# Patient Record
Sex: FEMALE | Race: WHITE | Employment: OTHER | URBAN - METROPOLITAN AREA
[De-identification: names, ages, dates, MRNs, and addresses within clinical notes are randomized per-mention and may not be internally consistent; named-entity substitution may affect disease eponyms.]

---

## 2017-06-14 ENCOUNTER — OFFICE VISIT (OUTPATIENT)
Dept: ORTHOPEDICS | Facility: CLINIC | Age: 52
End: 2017-06-14

## 2017-06-14 VITALS — WEIGHT: 202 LBS | BODY MASS INDEX: 31.71 KG/M2 | HEIGHT: 67 IN

## 2017-06-14 DIAGNOSIS — G89.29 CHRONIC MIDLINE LOW BACK PAIN WITH RIGHT-SIDED SCIATICA: Primary | ICD-10-CM

## 2017-06-14 DIAGNOSIS — M54.41 CHRONIC MIDLINE LOW BACK PAIN WITH RIGHT-SIDED SCIATICA: Primary | ICD-10-CM

## 2017-06-14 DIAGNOSIS — E11.9 DIABETES MELLITUS, TYPE 2 (H): ICD-10-CM

## 2017-06-14 ASSESSMENT — ENCOUNTER SYMPTOMS
NECK PAIN: 1
POLYDIPSIA: 1
SEIZURES: 0
NECK MASS: 0
HOARSE VOICE: 0
SMELL DISTURBANCE: 0
MYALGIAS: 1
WEIGHT LOSS: 0
NUMBNESS: 1
WEAKNESS: 1
SLEEP DISTURBANCES DUE TO BREATHING: 0
HALLUCINATIONS: 0
INCREASED ENERGY: 1
SINUS PAIN: 0
EYE IRRITATION: 0
PARALYSIS: 0
DEPRESSION: 1
POLYPHAGIA: 0
STIFFNESS: 1
ORTHOPNEA: 0
POOR WOUND HEALING: 0
INSOMNIA: 1
EXERCISE INTOLERANCE: 0
SPEECH CHANGE: 0
LOSS OF CONSCIOUSNESS: 0
NERVOUS/ANXIOUS: 1
JOINT SWELLING: 0
DECREASED CONCENTRATION: 1
NAIL CHANGES: 0
MUSCLE WEAKNESS: 1
CHILLS: 0
DISTURBANCES IN COORDINATION: 0
EYE REDNESS: 0
ARTHRALGIAS: 1
CLAUDICATION: 0
WEIGHT GAIN: 0
TROUBLE SWALLOWING: 0
MUSCLE CRAMPS: 1
LEG SWELLING: 0
SYNCOPE: 0
DECREASED APPETITE: 0
TASTE DISTURBANCE: 0
HEADACHES: 1
PANIC: 0
EYE WATERING: 0
SORE THROAT: 0
FEVER: 0
LEG PAIN: 1
HYPOTENSION: 0
ALTERED TEMPERATURE REGULATION: 0
SINUS CONGESTION: 0
TREMORS: 0
PALPITATIONS: 0
TACHYCARDIA: 0
SKIN CHANGES: 0
EYE PAIN: 0
BACK PAIN: 1
HYPERTENSION: 1
MEMORY LOSS: 1
TINGLING: 1
FATIGUE: 1
NIGHT SWEATS: 1
LIGHT-HEADEDNESS: 0
DOUBLE VISION: 0
DIZZINESS: 1

## 2017-06-14 NOTE — NURSING NOTE
"Reason For Visit:   Chief Complaint   Patient presents with     RECHECK     low back and davin leg pain, right worse than left       Primary MD: Dr. Mary Ellen Salgado  Ref. MD: Dr. Browning    Occupation [employer].  Currently working? Yes.  Work status?  Full time.  Date of injury: over the years  Type of injury: several injuries over the years.  Date of surgery: 2011  Type of surgery: low back surgery with Dr. Santos.  Smoker: No    Ht 1.71 m (5' 7.32\")  Wt 91.6 kg (202 lb)  BMI 31.33 kg/m2    Pain Assessment  Patient Currently in Pain: Yes  0-10 Pain Scale: 6  Primary Pain Location: Back  Pain Orientation: Lower  Other Pain Locations: davin leg pain  Pain Descriptors: Sharp, Dull, Aching, Burning, Throbbing  Alleviating Factors: Pain medication  Aggravating Factors: Other (comment) (any prolonged position or activity)    Oswestry (GABRIEL) Questionnaire    OSWESTRY DISABILITY INDEX 6/14/2017   Section 1 - Pain intensity -   Section 2 - Personal care (washing, dressing, etc.)  -   Section 3 - Lifting -   Section 4 - Walking -   Section 5 - Sitting -   Section 6 - Standing -   Section 7 - Sleeping -   Section 8 - Sex life (if applicable) -   Section 9 - Social life -   Section 10 - Traveling -   Count -   Sum -   Oswestry Score (%) -   SECTION 1-PAIN INTENSITY 3  The pain is fairly severe at the moment.   SECTION 2-PERSONAL CARE (WASHING,DRESSING,ETC.) 2  It is painful to look after myself and I am slow and careful.   SECTION 3-LIFTING 4  I can only lift very light weights.   SECTION 4-WALKING 3  Pain prevents me from walking more than 100 yards.   SECTION 5-SITTING 3  Pain prevents me from sitting for more than half an hour   SECTION 6-STANDING 3  Pain prevents me from standing for more than half an hour.   SECTION 7-SLEEPING 2  Because of pain I have less than 6 hours sleep.   SECTION 8-SEX LIFE (IF APPLICABLE) 4  My sex life is nearly non existent because of pain.   SECTION 9-SOCIAL LIFE 3  Pain has restricted my " social life and I do not go out as often.   SECTION 10-TRAVELING 2  Pain is bad but I am able to manage trips over two hours.   Oswestry Disability Index: Count 10   GABRIEL: Total Score = SUM (total for answered questions) 29   Computed Oswestry Score 58 (%)                      Numeric Rating Scale:  VAS Scores     VAS Survey 6/14/2017   What is your level of back pain during the last week: 6.5   What is your level of RIGHT leg pain during the last week: 7.0   What is your level of LEFT leg pain during the last week: 3.5   What is your level of neck pain during the last week: 5.5   What is your level of RIGHT arm pain during the last week: 0.5   What is your level of LEFT arm pain during the last week: 1.0                Promis 10 Assessment    PROMIS 10 6/14/2017   In general, would you say your health is: Good = 3   In general, would you say your quality of life is: Poor = 1   In general, how would you rate your physical health? Good = 3   In general, how would you rate your mental health, including your mood and your ability to think? Good = 3   In general, how would you rate your satisfaction with your social activities and relationships? Fair = 2   In general, please rate how well you carry out your usual social activities and roles Fair = 2   To what extent are you able to carry out your everyday physical activities such as walking, climbing stairs, carrying groceries, or moving a chair? A Little = 2   How often have you been bothered by emotional problems such as feeling anxious, depressed or irritable? Sometimes = 3   How would you rate your fatigue on average? Moderate = 3   How would you rate your pain on average?   0 = No Pain  to  10 = Worst Imaginable Pain 7   Global Physical Health Score : Raw Score 10 (SUM : G03 - G06 - G07 - G08)   Global Mentall Health Score : Raw Score 9 (SUM : G02 - G04 - G05 - G10)   Total (Physical + Mental Health Score) 19

## 2017-06-14 NOTE — MR AVS SNAPSHOT
After Visit Summary   6/14/2017    Yun Sagastume    MRN: [id number]           Patient Information     Date Of Birth          1965        Visit Information        Provider Department      6/14/2017 8:45 AM Chris Tim MD [facility]        Today's Diagnoses     Chronic midline low back pain with right-sided sciatica    -  1    Diabetes mellitus, type 2 (H)           Follow-ups after your visit        Additional Services     NEUROLOGY ADULT REFERRAL       Your provider has referred you to: Neurology DR.Leland West at [facility]. Clinic of Neurology # [phone] fax# [phone]      Reason for Referral: Consult,  New MRI &  EMG to be done before appt. At [facility] & sent to     Please be aware that coverage of these services is subject to the terms and limitations of your health insurance plan.  Call member services at your health plan with any benefit or coverage questions.      Please bring the following with you to your appointment:    (1) Any X-Rays, CTs or MRIs which have been performed.  Contact the facility where they were done to arrange for  prior to your scheduled appointment.    (2) List of current medications  (3) This referral request   (4) Any documents/labs given to you for this referral                  Your next 10 appointments already scheduled     Jul 03, 2017  3:00 PM CDT   (Arrive by 2:45 PM)   EMG with Raúl Yin MD   [facility] ([facility])    [address] [place] [address] [phone]           Do not use lotions or creams on the area to be tested. If you are on blood thinners (Warfarin, Coumadin, Lovenox, etc), please contact your primary care physician to check if it is safe to stop them 3 days prior to testing. If you have anxiety, please check with your referring physician to obtain anti-anxiety medication for the procedure.            Aug 03, 2017 11:45 AM CDT  "  (Arrive by 11:15 AM)   RETURN SPINE with Chris Tim MD   [facility] ([facility])    [address]  [address] [facility] [address] [phone]              Who to contact     Please call your clinic at [phone] to:    Ask questions about your health    Make or cancel appointments    Discuss your medicines    Learn about your test results    Speak to your doctor   If you have compliments or concerns about an experience at your clinic, or if you wish to file a complaint, please contact [facility] Patient Relations at [phone] or email us at [email or web address]         Additional Information About Your Visit        Bi[employer] Information     [employer] is an electronic gateway that provides easy, online access to your medical records. With [employer], you can request a clinic appointment, read your test results, renew a prescription or communicate with your care team.     To sign up for [employer] visit the website at [email or web address]   You will be asked to enter the access code listed below, as well as some personal information. Please follow the directions to create your username and password.     Your access code is: [email or web address]  Expires: 2017  6:30 AM     Your access code will  in 90 days. If you need help or a new code, please contact your [facility] or call [phone] for assistance.        Care EveryWhere ID     This is your Care EveryWhere ID. This could be used by other organizations to access your [facility] medical records  [id number]        Your Vitals Were     Height BMI (Body Mass Index)                1.71 m (5' 7.32\") 31.33 kg/m2           Blood Pressure from Last 3 Encounters:   No data found for BP    Weight from Last 3 Encounters:   No data found for Wt              Today, you had the following     No orders found for display         Today's " Medication Changes          These changes are accurate as of: 6/14/17 11:59 PM.  If you have any questions, ask your nurse or doctor.               Stop taking these medicines if you haven't already. Please contact your care team if you have questions.     ranitidine 150 MG tablet   Commonly known as:  ZANTAC   Stopped by:  Chris Tim MD                    Primary Care Provider Office Phone # Fax #    Francisca John [phone]       [place] [address]        Equal Access to Services     [facility]: Hadii aad ku hadasho Soomaali, waaxda luqadaha, qaybta kaalmada adeegyada, waxay idiin hayaan lizandro geller . So [facility] [phone].    ATENCIÓN: Si habla español, tiene a grove disposición servicios gratuitos de asistencia lingüística. [facility] [phone].    We comply with applicable federal civil rights laws and [place] laws. We do not discriminate on the basis of race, color, national origin, age, disability sex, sexual orientation or gender identity.            Thank you!     Thank you for choosing [facility]  for your care. Our goal is always to provide you with excellent care. Hearing back from our patients is one way we can continue to improve our services. Please take a few minutes to complete the written survey that you may receive in the mail after your visit with us. Thank you!             Your Updated Medication List - Protect others around you: Learn how to safely use, store and throw away your medicines at www.disposemymeds.org.          This list is accurate as of: 6/14/17 11:59 PM.  Always use your most recent med list.                   Brand Name Dispense Instructions for use Diagnosis    cyclobenzaprine 10 MG tablet    FLEXERIL     Take 10 mg by mouth        OMEPRAZOLE PO      Take 20 mg by mouth every morning        oxyCODONE-acetaminophen 7.5-325 MG per tablet    PERCOCET     Take 1 tablet by mouth         PAXIL 40 MG tablet   Generic drug:  PARoxetine      Take 40 mg by mouth

## 2017-06-14 NOTE — LETTER
6/14/2017      RE: Yun Sagastume  [address]  [facility] [address]       REASON FOR VISIT: Recheck low back pain    REFERRING PHYSICIAN: Juanjose Browning     PRIMARY CARE PHYSICIAN: Francisca John    HISTORY OF PRESENT ILLNESS: Yun Sagastume is a 52 year old female who returns to clinic today for further follow-up of her low back and leg pain.  She was previously evaluated in our clinic in August 2016, and we recommended an anterior posterior spinal fusion at L5-S1.  This was a second opinion, and at that time she thought she would have surgery with Dr. Morris.  However, her insurance has changed, and she would like to have surgery here at the [facility].  She reports that her symptoms have not changed since her last visit, but they have become more frequent.  She continues to complain of mostly right leg pain, which starts at the hip and radiates either to the knee or to the ankle.  She occasionally gets right foot numbness in the entire foot.  She also complains of similar pain in the left (minus the foot numbness), although it occurs less frequently.  She is currently taking Percocet 7.5/325 five times daily.  She was recently diagnosed with diabetes (in March).  She is not taking any medications but trying to manage it with lifestyle changes.  Although she finds it difficult to exercise due to her back pain.  Her ultimate goal is to get back to recumbent biking.     Oswestry score: 58% (previously 48%)  [id number]: 19 (previously 18)  Pain scale: 6 (previously 5)    PHYSICAL EXAM:   Constitutional - Patient is healthy, well-nourished and appears stated age.    BMI = 31.33    Respiratory - Patient is breathing normally and in no respiratory distress.   Skin - No suspicious rashes or lesions.   Psychiatric - Normal mood and affect.   Eyes - Visual acuity is normal to the written word.   ENT - Hearing intact to the spoken word.   GI - No abdominal distention.   Musculoskeletal - Antalgic gait  without use of assistive devices.            Lumbar Spine:    Appearance - Normal.  Stands with knees flexed.      Palpation - Non-tender to palpation    ROM - Deferred    Motor -        LOWER EXTREMITY Left Right   Hip flexion 5/5 5/5   Knee flexion 5/5 5/5   Knee extension 4+/5 4+/5   Ankle dorsiflexion 5/5 5/5   Ankle plantarflexion 5/5 5/5   Great toe extension 5/5 5/5   Great toe flexion 5/5 5/5        Special tests -     Pain with hip ROM - Negative     Neurologic - Sensation on the right medial knee is decreased as compared to the left.  Sensation is otherwise equal bilaterally.      REFLEXES Left Right   Patella 2+ 2+   Ankle jerk 2+ 2+     IMAGING: Radiographs of the full spine were obtained today. They show a positive sagittal imbalance and leg length discrepancy with the right being taller than the left.  PI = 59 degrees.  LL = 42 degrees.  Pelvic tilt = 34 degrees (should be less than 25).  Thoracic kyphosis = 54 degrees.  L4-5 = 19 degrees.  There are also profound marginal osteophytes at T8-9 and T7-8.  See full radiologic report in chart.    CLINICAL ASSESSMENT:   1. Lumbar spine degenerative disc disease  2. Positive sagittal imbalance  3. Diabetes mellitus, Type 2    DISCUSSION/PLAN:   Rabia is a 52 year old female who returned to clinic today with continued low back pain.  She would like to discuss her surgical options, as she has decided not to have surgery with Dr. Morris.  Updated radiographs were obtained today, and they showed a PI-LL mismatch of about 17 degrees.  Her pelvic tilt measured 34 degrees.  We discussed these findings in detail together, and time was spent explaining her imbalance with the use of drawings.  We discussed that her new upper back pain is likely due to muscular compensation of trying to keep her spine upright.  We briefly reviewed fusion surgery today.  Because of her diagnosis of diabetes, we should obtain a lower extremity EMG to rule out peripheral neuropathy.   She was referred to Dr. Quinn West for that testing.  We should also obtain an updated MRI of her lumbar spine.  Finally, she should have flexion extension radiographs of the lumbar spine before leaving clinic today.  These tests will help in the decision making process regarding which levels to address in surgery and what approach to take.  She should return to clinic when these tests have been completed.  At that time, we can have a more thorough surgical discussion, including the risks and benefits.  She should plan to bring a family member when we have that discussion.  She expressed understanding and agreement with this plan.    All questions and concerns were answered to the patient's apparent satisfaction before leaving the clinic.     Thank you for allowing Dr. Tim and I to participate in the care of Yun Sagastume.    Respectfully,  Marleen Walton PA-C    CC  Copy to patient  [address] [place] [address]    Chris Tim MD

## 2017-06-14 NOTE — Clinical Note
6/14/2017       RE: Yun Sagastume  [address]  [place] [address]     Dear Colleague,    Thank you for referring your patient, Yun Sagastume, to the [facility] at [facility]. Please see a copy of my visit note below.    No notes on file    Again, thank you for allowing me to participate in the care of your patient.      Sincerely,    Chris Tim MD

## 2017-06-14 NOTE — PROGRESS NOTES
REASON FOR VISIT: Recheck low back pain    REFERRING PHYSICIAN: Juanjose Browning     PRIMARY CARE PHYSICIAN: Francisca John    HISTORY OF PRESENT ILLNESS: Yun Sagastume is a 52 year old female who returns to clinic today for further follow-up of her low back and leg pain.  She was previously evaluated in our clinic in August 2016, and we recommended an anterior posterior spinal fusion at L5-S1.  This was a second opinion, and at that time she thought she would have surgery with Dr. Morris.  However, her insurance has changed, and she would like to have surgery here at the [facility].  She reports that her symptoms have not changed since her last visit, but they have become more frequent.  She continues to complain of mostly right leg pain, which starts at the hip and radiates either to the knee or to the ankle.  She occasionally gets right foot numbness in the entire foot.  She also complains of similar pain in the left (minus the foot numbness), although it occurs less frequently.  She is currently taking Percocet 7.5/325 five times daily.  She was recently diagnosed with diabetes (in March).  She is not taking any medications but trying to manage it with lifestyle changes.  Although she finds it difficult to exercise due to her back pain.  Her ultimate goal is to get back to recumbent biking.     Oswestry score: 58% (previously 48%)  Frzyzp86: 19 (previously 18)  Pain scale: 6 (previously 5)    PHYSICAL EXAM:   Constitutional - Patient is healthy, well-nourished and appears stated age.    BMI = 31.33    Respiratory - Patient is breathing normally and in no respiratory distress.   Skin - No suspicious rashes or lesions.   Psychiatric - Normal mood and affect.   Eyes - Visual acuity is normal to the written word.   ENT - Hearing intact to the spoken word.   GI - No abdominal distention.   Musculoskeletal - Antalgic gait without use of assistive devices.            Lumbar Spine:    Appearance - Normal.   Stands with knees flexed.      Palpation - Non-tender to palpation    ROM - Deferred    Motor -        LOWER EXTREMITY Left Right   Hip flexion 5/5 5/5   Knee flexion 5/5 5/5   Knee extension 4+/5 4+/5   Ankle dorsiflexion 5/5 5/5   Ankle plantarflexion 5/5 5/5   Great toe extension 5/5 5/5   Great toe flexion 5/5 5/5        Special tests -     Pain with hip ROM - Negative     Neurologic - Sensation on the right medial knee is decreased as compared to the left.  Sensation is otherwise equal bilaterally.      REFLEXES Left Right   Patella 2+ 2+   Ankle jerk 2+ 2+     IMAGING: Radiographs of the full spine were obtained today. They show a positive sagittal imbalance and leg length discrepancy with the right being taller than the left.  PI = 59 degrees.  LL = 42 degrees.  Pelvic tilt = 34 degrees (should be less than 25).  Thoracic kyphosis = 54 degrees.  L4-5 = 19 degrees.  There are also profound marginal osteophytes at T8-9 and T7-8.  See full radiologic report in chart.    CLINICAL ASSESSMENT:   1. Lumbar spine degenerative disc disease  2. Positive sagittal imbalance  3. Diabetes mellitus, Type 2    DISCUSSION/PLAN:   Rabia is a 52 year old female who returned to clinic today with continued low back pain.  She would like to discuss her surgical options, as she has decided not to have surgery with Dr. Morris.  Updated radiographs were obtained today, and they showed a PI-LL mismatch of about 17 degrees.  Her pelvic tilt measured 34 degrees.  We discussed these findings in detail together, and time was spent explaining her imbalance with the use of drawings.  We discussed that her new upper back pain is likely due to muscular compensation of trying to keep her spine upright.  We briefly reviewed fusion surgery today.  Because of her diagnosis of diabetes, we should obtain a lower extremity EMG to rule out peripheral neuropathy.  She was referred to Dr. Quinn West for that testing.  We should also obtain an  updated MRI of her lumbar spine.  Finally, she should have flexion extension radiographs of the lumbar spine before leaving clinic today.  These tests will help in the decision making process regarding which levels to address in surgery and what approach to take.  She should return to clinic when these tests have been completed.  At that time, we can have a more thorough surgical discussion, including the risks and benefits.  She should plan to bring a family member when we have that discussion.  She expressed understanding and agreement with this plan.    All questions and concerns were answered to the patient's apparent satisfaction before leaving the clinic.     Thank you for allowing Dr. Tim and I to participate in the care of Yun Sagastume.    Respectfully,  Marleen Walton PA-C    I saw and evaluated the patient on the day of the visit and I formulated the plan.  Chris Tim MD      CC  Copy to patient    [address]  [facility] [address]    Answers for HPI/ROS submitted by the patient on 6/14/2017   General Symptoms: Yes  Skin Symptoms: Yes  HENT Symptoms: Yes  EYE SYMPTOMS: Yes  HEART SYMPTOMS: Yes  LUNG SYMPTOMS: No  INTESTINAL SYMPTOMS: No  URINARY SYMPTOMS: No  GYNECOLOGIC SYMPTOMS: No  BREAST SYMPTOMS: No  SKELETAL SYMPTOMS: Yes  BLOOD SYMPTOMS: No  NERVOUS SYSTEM SYMPTOMS: Yes  MENTAL HEALTH SYMPTOMS: Yes  Fever: No  Loss of appetite: No  Weight loss: No  Weight gain: No  Fatigue: Yes  Night sweats: Yes  Chills: No  Increased stress: Yes  Excessive hunger: No  Excessive thirst: Yes  Feeling hot or cold when others believe the temperature is normal: No  Loss of height: No  Post-operative complications: No  Surgical site pain: No  Hallucinations: No  Change in or Loss of Energy: Yes  Hyperactivity: No  Confusion: No  Changes in hair: No  Changes in moles/birth marks: No  Itching: No  Rashes: No  Changes in nails: No  Acne: No  Hair in places you don't want it: No  Change in facial hair:  No  Warts: No  Non-healing sores: No  Scarring: No  Flaking of skin: No  Color changes of hands/feet in cold : Yes  Sun sensitivity: No  Skin thickening: No  Ear pain: No  Ear discharge: No  Hearing loss: No  Tinnitus: Yes  Nosebleeds: No  Congestion: No  Sinus pain: No  Trouble swallowing: No   Voice hoarseness: No  Mouth sores: No  Sore throat: No  Tooth pain: No  Gum tenderness: No  Bleeding gums: No  Change in taste: No  Change in sense of smell: No  Dry mouth: No  Hearing aid used: No  Neck lump: No  Eye pain: No  Vision loss: No  Dry eyes: No  Watery eyes: No  Eye bulging: No  Double vision: No  Flashing of lights: No  Spots: No  Floaters: Yes  Redness: No  Crossed eyes: No  Tunnel Vision: No  Yellowing of eyes: No  Eye irritation: No  Chest pain or pressure: No  Fast or irregular heartbeat: No  Pain in legs with walking: Yes  Swelling in feet or ankles: No  Trouble breathing while lying down: No  Fingers or Toes appear blue: Yes  High blood pressure: Yes  Low blood pressure: No  Fainting: No  Murmurs: No  Chest pain on exertion: No  Chest pain at rest: No  Cramping pain in leg during exercise: No  Pacemaker: No  Varicose veins: No  Edema or swelling: No  Fast heart beat: No  Wake up at night with shortness of breath: No  Heart flutters: No  Light-headedness: No  Exercise intolerance: No  Back pain: Yes  Muscle aches: Yes  Neck pain: Yes  Swollen joints: No  Joint pain: Yes  Bone pain: No  Muscle cramps: Yes  Muscle weakness: Yes  Joint stiffness: Yes  Bone fracture: No  Trouble with coordination: No  Dizziness or trouble with balance: Yes  Fainting or black-out spells: No  Memory loss: Yes  Headache: Yes  Seizures: No  Speech problems: No  Tingling: Yes  Tremor: No  Weakness: Yes  Difficulty walking: Yes  Paralysis: No  Numbness: Yes  Nervous or Anxious: Yes  Depression: Yes  Trouble sleeping: Yes  Trouble thinking or concentrating: Yes  Mood changes: Yes  Panic attacks: No

## 2017-07-03 ENCOUNTER — OFFICE VISIT (OUTPATIENT)
Dept: NEUROLOGY | Facility: CLINIC | Age: 52
End: 2017-07-03

## 2017-07-03 DIAGNOSIS — M54.41 CHRONIC MIDLINE LOW BACK PAIN WITH RIGHT-SIDED SCIATICA: ICD-10-CM

## 2017-07-03 DIAGNOSIS — E11.42 DIABETIC POLYNEUROPATHY ASSOCIATED WITH TYPE 2 DIABETES MELLITUS (H): Primary | ICD-10-CM

## 2017-07-03 DIAGNOSIS — G89.29 CHRONIC MIDLINE LOW BACK PAIN WITH RIGHT-SIDED SCIATICA: ICD-10-CM

## 2017-07-03 NOTE — MR AVS SNAPSHOT
After Visit Summary   7/3/2017    Yun Sagastume    MRN: [id number]           Patient Information     Date Of Birth          1965        Visit Information        Provider Department      7/3/2017 3:00 PM Raúl Yin MD [facility] EMG        Today's Diagnoses     Diabetic polyneuropathy associated with type 2 diabetes mellitus (H)    -  1    Chronic midline low back pain with right-sided sciatica           Follow-ups after your visit        Your next 10 appointments already scheduled     Aug 03, 2017 11:45 AM CDT   (Arrive by 11:15 AM)   RETURN SPINE with Chris Tim MD   [facility] ([facility])    [address] [place] [address]  [address] [facility] [address] [phone]              Who to contact     Please call your clinic at [phone] to:    Ask questions about your health    Make or cancel appointments    Discuss your medicines    Learn about your test results    Speak to your doctor   If you have compliments or concerns about an experience at your clinic, or if you wish to file a complaint, please contact [facility] Patient Relations at [phone] or email us at [email or web address]         Additional Information About Your Visit        MyChart Information     [employer] is an electronic gateway that provides easy, online access to your medical records. With [email or web address], you can request a clinic appointment, read your test results, renew a prescription or communicate with your care team.     To sign up for [email or web address] visit the website at [email or web address]   You will be asked to enter the access code listed below, as well as some personal information. Please follow the directions to create your username and password.     Your access code is: [email or web address]  Expires: 2017  6:30 AM     Your access code will  in 90 days. If you need help or a new code, please contact your [facility] [facility] or call [phone] for assistance.        Care EveryWhere ID     This is your Care EveryWhere ID. This could be used by other organizations to access your [place] medical records  [id number]         Blood Pressure from Last 3 Encounters:   No data found for BP    Weight from Last 3 Encounters:   06/14/17 91.6 kg (202 lb)   08/25/16 92.6 kg (204 lb 3.2 oz)              We Performed the Following     HC NCS MOTOR W OR W/O F-WAVE, 5 OR 6     HC NEEDLE EMG EA EXTREMITY W/PARASPINAL AREA LIMITED     HC NEEDLE EMG EA EXTREMTY W/PARASPINAL AREA COMPLETE     NEUROLOGY ADULT REFERRAL        Primary Care Provider Office Phone # Fax #    Mary Ellen Salgado [phone]       [address] [address]        Equal Access to Services     LORE TREVINO : Hadii aad ku hadasho Soomaali, waaxda luqadaha, qaybta kaalmada adeegyada, waxay idiin hayaan adebrennon khfred geller . So [facility] [phone].    ATENCIÓN: Si habla español, tiene a grove disposición servicios gratuitos de asistencia lingüística. Radha al [phone].    We comply with applicable federal civil rights laws and [place] laws. We do not discriminate on the basis of race, color, national origin, age, disability sex, sexual orientation or gender identity.            Thank you!     Thank you for choosing [facility]  for your care. Our goal is always to provide you with excellent care. Hearing back from our patients is one way we can continue to improve our services. Please take a few minutes to complete the written survey that you may receive in the mail after your visit with us. Thank you!             Your Updated Medication List - Protect others around you: Learn how to safely use, store and throw away your medicines at www.disposemymeds.org.          This list is accurate as of: 7/3/17  3:30 PM.  Always use your most recent med list.                   Brand Name Dispense Instructions for use  Diagnosis    cyclobenzaprine 10 MG tablet    FLEXERIL     Take 10 mg by mouth        OMEPRAZOLE PO      Take 20 mg by mouth every morning        oxyCODONE-acetaminophen 7.5-325 MG per tablet    PERCOCET     Take 1 tablet by mouth        PAXIL 40 MG tablet   Generic drug:  PARoxetine      Take 40 mg by mouth

## 2017-07-03 NOTE — PROGRESS NOTES
[facility]  Electrodiagnostic Laboratory    Nerve Conduction & EMG Report          Patient:       Yun Sagastume  Patient ID:    [id number]  Gender:        Female  YOB: 1965  Age:           52 Years 5 Months        History & Examination:  52 year old woman s/p L5-S1 fusion in 2011 now with recurrent low back pain over the last 2 years. Pain radiates into legs, right more than left. Eval for radiculopathy vs polyneuropathy.     Techniques: Motor and sensory conduction studies were done with surface recording electrodes. EMG was done with a concentric needle electrode.     Results:  Nerve conduction studies:   1. Bilateral sural sensory responses were mildly reduced.   2. Bilateral peroneal-EDB and tibial-AH motor responses were normal.     Needle EMG of selected proximal and distal right and left leg muscles was performed as tabulated below. No abnormal spontaneous activity was observed in the sampled muscles. Motor unit potential morphology and recruitment patterns were normal.     Interpretation:  This is an abnormal study. 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 There is no evidence of a lumbosacral radiculopathy affecting the lower limbs on the basis of this study.    Raúl Yin MD  Department of Neurology         Sensory NCS      Nerve / Sites Rec. Site Onset Peak NP Amp Ref. PP Amp Dist Stephan Ref. Temp     ms ms  V  V  V cm m/s m/s  C   R SURAL - Lat Mall      Calf Ankle 3.49 4.53 6.0 8.0 5.6 14 40.1  31.3   L SURAL - Lat Mall      Calf Ankle 3.28 4.11 6.6 8.0 6.0 14 42.7 38.0 31       Motor NCS      Nerve / Sites Rec. Site Lat Ref. Amp Ref. Rel Amp Dist Stephan Ref. Dur. Area Temp.     ms ms mV mV % cm m/s m/s ms %  C   R DEEP PERONEAL - EDB      Ankle EDB 4.74 6.00 3.9 2.5 100 8   6.25 100 31.1      FibHead EDB 11.88  3.4  87 30 42.0 38.0 7.45 88.4 31      Pop Fos EDB 14.11  3.4  88.2 9.5 42.4 38.0 8.96 117  31.2   L DEEP PERONEAL - EDB      Ankle EDB 5.63 6.00 5.1 2.5 100 8   6.09 100 31.1   R TIBIAL - AH      Ankle AH 5.21 6.00 18.4 4.0 100 8   6.67 100 31.1      Pop Fos AH 14.01  10.0  54.3 39 44.3 38.0 8.07 63.2 31   L TIBIAL - AH      Ankle AH 5.05 6.00 14.7 4.0 100 8   6.72 100 31.1       F  Wave      Nerve Min F Lat Max F Lat Mean FLat Temp.    ms ms ms  C   R TIBIAL 52.50 55.78 53.97 31.2       EMG Summary Table     Normal    IA Fib Fasc H.F. Amp Dur. PPP Pattern   R. VAST LATERALIS Normal None None None N N None Normal   R. TIB ANTERIOR Normal None None None N N None Normal   R. GASTROCN (MED) Normal None None None N N None Normal   R. PERON LONGUS Normal None None None N N None Normal   R. TIB POSTERIOR Normal None None None N N None Normal   L. VAST LATERALIS Normal None None None N N None Normal   L. TIB ANTERIOR Normal None None None N N None Normal   L. GASTROCN (MED) Normal None None None N N None Normal

## 2017-07-03 NOTE — LETTER
7/3/2017       RE: [facility]un Sagastume  [address] [facility] [facility] [address]     Dear Colleague,    Thank you for referring your patient, Yun Sagastume, to the [facility] at [facility]. Please see a copy of my visit note below.        [facility]  Electrodiagnostic Laboratory    Nerve Conduction & EMG Report    Patient:       Yun Sagastume  Patient ID:    [id number]  Gender:        Female  YOB: 1965  Age:           52 Years 5 Months        History & Examination:  52 year old woman s/p L5-S1 fusion in 2011 now with recurrent low back pain over the last 2 years. Pain radiates into legs, right more than left. Eval for radiculopathy vs polyneuropathy.     Techniques: Motor and sensory conduction studies were done with surface recording electrodes. EMG was done with a concentric needle electrode.     Results:  Nerve conduction studies:   1. Bilateral sural sensory responses were mildly reduced.   2. Bilateral peroneal-EDB and tibial-AH motor responses were normal.     Needle EMG of selected proximal and distal right and left leg muscles was performed as tabulated below. No abnormal spontaneous activity was observed in the sampled muscles. Motor unit potential morphology and recruitment patterns were normal.     Interpretation:  This is an abnormal study. 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 There is no evidence of a lumbosacral radiculopathy affecting the lower limbs on the basis of this study.    Raúl Yin MD  Department of Neurology         Sensory NCS      Nerve / Sites Rec. Site Onset Peak NP Amp Ref. PP Amp Dist Stephan Ref. Temp     ms ms  V  V  V cm m/s m/s  C   R SURAL - Lat Mall      Calf Ankle 3.49 4.53 6.0 8.0 5.6 14 40.1  31.3   L SURAL - Lat Mall      Calf Ankle 3.28 4.11 6.6 8.0 6.0 14 42.7 38.0 31       Motor NCS      Nerve / Sites Rec. Site Lat Ref. Amp Ref. Rel Amp  Dist Stephan Ref. Dur. Area Temp.     ms ms mV mV % cm m/s m/s ms %  C   R DEEP PERONEAL - EDB      Ankle EDB 4.74 6.00 3.9 2.5 100 8   6.25 100 31.1      FibHead EDB 11.88  3.4  87 30 42.0 38.0 7.45 88.4 31      Pop Fos EDB 14.11  3.4  88.2 9.5 42.4 38.0 8.96 117 31.2   L DEEP PERONEAL - EDB      Ankle EDB 5.63 6.00 5.1 2.5 100 8   6.09 100 31.1   R TIBIAL - AH      Ankle AH 5.21 6.00 18.4 4.0 100 8   6.67 100 31.1      Pop Fos AH 14.01  10.0  54.3 39 44.3 38.0 8.07 63.2 31   L TIBIAL - AH      Ankle AH 5.05 6.00 14.7 4.0 100 8   6.72 100 31.1       F  Wave      Nerve Min F Lat Max F Lat Mean FLat Temp.    ms ms ms  C   R TIBIAL 52.50 55.78 53.97 31.2       EMG Summary Table     Normal    IA Fib Fasc H.F. Amp Dur. PPP Pattern   R. VAST LATERALIS Normal None None None N N None Normal   R. TIB ANTERIOR Normal None None None N N None Normal   R. GASTROCN (MED) Normal None None None N N None Normal   R. PERON LONGUS Normal None None None N N None Normal   R. TIB POSTERIOR Normal None None None N N None Normal   L. VAST LATERALIS Normal None None None N N None Normal   L. TIB ANTERIOR Normal None None None N N None Normal   L. GASTROCN (MED) Normal None None None N N None Normal                            Again, thank you for allowing me to participate in the care of your patient.      Sincerely,    Raúl Yin MD

## 2017-07-17 ENCOUNTER — TELEPHONE (OUTPATIENT)
Dept: ORTHOPEDICS | Facility: CLINIC | Age: 52
End: 2017-07-17

## 2017-07-17 NOTE — TELEPHONE ENCOUNTER
[facility]:  Nursing Note  SITUATION                                                      Yun Sagastume is a 52 year old female who calls with request if Dr Stuart at [facility] would be able to read tests without having to see her in clinic. Pt was previously seen by Dr Stuart and did not want to go back to see him, was referred to Dr Quinn West at [facility], however he is booked out until September and upcoming aot with Dr Tim is 8.3.17.    BACKGROUND                                                        Patient is is being treated for low back pain.    Date of last clinic appointment: 6.14.17    Does patient have a future appointment scheduled?  Yes -  next appointment is on: 8.3.17      ASSESSMENT      Assessment not needed, provider is out of our system, would not be able to inform patient if testing can be read by provider without being seen.     PLAN                                                      Nursing Interventions: Patient will reach out to [facility] to keep apt there, will call our office if any further information is needed.   RECOMMENDED DISPOSITION: Will follow up with Dr Tim as scheduled.  Will comply with recommendation: Yes    If further questions/concerns or if symptoms do not improve, worsen or new symptoms develop, patient/family are advised to call [phone], option #3 to speak with a triage nurse, as soon as possible.    Maddi Zuluaga

## 2017-08-03 ENCOUNTER — OFFICE VISIT (OUTPATIENT)
Dept: ORTHOPEDICS | Facility: CLINIC | Age: 52
End: 2017-08-03

## 2017-08-03 VITALS — BODY MASS INDEX: 30.73 KG/M2 | HEIGHT: 67 IN | WEIGHT: 195.8 LBS

## 2017-08-03 DIAGNOSIS — M54.41 CHRONIC MIDLINE LOW BACK PAIN WITH RIGHT-SIDED SCIATICA: Primary | ICD-10-CM

## 2017-08-03 DIAGNOSIS — G89.29 CHRONIC MIDLINE LOW BACK PAIN WITH RIGHT-SIDED SCIATICA: Primary | ICD-10-CM

## 2017-08-03 RX ORDER — LISINOPRIL/HYDROCHLOROTHIAZIDE 10-12.5 MG
1 TABLET ORAL DAILY
COMMUNITY

## 2017-08-03 ASSESSMENT — ENCOUNTER SYMPTOMS
MYALGIAS: 1
LOSS OF CONSCIOUSNESS: 0
MUSCLE WEAKNESS: 0
BACK PAIN: 1
HYPOTENSION: 0
SYNCOPE: 0
NUMBNESS: 1
MUSCLE CRAMPS: 1
LIGHT-HEADEDNESS: 0
TINGLING: 1
WEAKNESS: 0
CLAUDICATION: 0
LEG PAIN: 1
SEIZURES: 0
NERVOUS/ANXIOUS: 1
TACHYCARDIA: 0
STIFFNESS: 1
PARALYSIS: 0
DECREASED CONCENTRATION: 1
LEG SWELLING: 0
PANIC: 0
HYPERTENSION: 1
DIZZINESS: 1
ORTHOPNEA: 0
ARTHRALGIAS: 1
NECK PAIN: 0
EXERCISE INTOLERANCE: 1
SPEECH CHANGE: 0
SLEEP DISTURBANCES DUE TO BREATHING: 0
PALPITATIONS: 0
DISTURBANCES IN COORDINATION: 1
MEMORY LOSS: 0
DEPRESSION: 1
INSOMNIA: 1
JOINT SWELLING: 0
TREMORS: 1
HEADACHES: 0

## 2017-08-03 NOTE — NURSING NOTE
"Reason For Visit:   Chief Complaint   Patient presents with     RECHECK     low back pain, davin leg pain, right worse than left, review EMG, MRI,  discuss surgery       Primary MD: Mary Ellen Salgado  Ref. MD: Dr. Browning      Occupation [employer].  Currently working? Yes.  Work status?  Full time.  Date of injury: over the years  Type of injury: several injuries.  Date of surgery: 2011  Type of surgery: low back surgery with Dr. Santos.  Smoker: No      Ht 1.7 m (5' 6.93\")  Wt 88.8 kg (195 lb 12.8 oz)  BMI 30.73 kg/m2    Pain Assessment  Patient Currently in Pain: Yes  0-10 Pain Scale:  (5 - 10)  Primary Pain Location: Back  Other Pain Locations: both legs  Pain Descriptors: Sharp, Dull, Aching, Burning, Throbbing  Alleviating Factors: Other (comment) (nothing is dependable)  Aggravating Factors: Sitting, Standing, Walking, Stairs    Oswestry (GARBIEL) Questionnaire    OSWESTRY DISABILITY INDEX 8/3/2017   Section 1 - Pain intensity 2   Section 2 - Personal care (washing, dressing, etc.)  -1   Section 3 - Lifting 4   Section 4 - Walking 1   Section 5 - Sitting 3   Section 6 - Standing 2   Section 7 - Sleeping 2   Section 8 - Sex life (if applicable) 3   Section 9 - Social life 1   Section 10 - Traveling 1   Count 9   Sum 19   Oswestry Score (%) 42.22   SECTION 1-PAIN INTENSITY The pain is moderate at the moment.   SECTION 2-PERSONAL CARE (WASHING,DRESSING,ETC.) Not answered   SECTION 3-LIFTING I can only lift very light weights.   SECTION 4-WALKING Pain prevents me from walking more than one mile.   SECTION 5-SITTING Pain prevents me from sitting for more than half an hour.   SECTION 6-STANDING Pain prevents me from standing for more than 1 hour.   SECTION 7-SLEEPING Because of pain I have less than 6 hours sleep.   SECTION 8-SEX LIFE (IF APPLICABLE) My sex life is severely restricted by pain.   SECTION 9-SOCIAL LIFE My social life is normal but increases the degree of pain.   SECTION 10-TRAVELING I can travel " anywhere but it gives me additional pain.   Oswestry Disability Index: Count 9   GABRIEL: Total Score = SUM (total for answered questions) 19   Computed Oswestry Score 42.22 (%)   Some recent data might be hidden                      Numeric Rating Scale:  VAS Scores     VAS Survey 8/3/2017   What is your level of back pain during the last week: 7.5   What is your level of RIGHT leg pain during the last week: 7.5   What is your level of LEFT leg pain during the last week: 4.5   What is your level of neck pain during the last week: 0   What is your level of RIGHT arm pain during the last week: 0   What is your level of LEFT arm pain during the last week: 0                Promis 10 Assessment    PROMIS 10 8/3/2017   In general, would you say your health is: Good   In general, would you say your quality of life is: Good   In general, how would you rate your physical health? Good   In general, how would you rate your mental health, including your mood and your ability to think? Fair   In general, how would you rate your satisfaction with your social activities and relationships? Fair   In general, please rate how well you carry out your usual social activities and roles Fair   To what extent are you able to carry out your everyday physical activities such as walking, climbing stairs, carrying groceries, or moving a chair? A little   How often have you been bothered by emotional problems such as feeling anxious, depressed or irritable? Often   How would you rate your fatigue on average? Moderate   How would you rate your pain on average?   0 = No Pain  to  10 = Worst Imaginable Pain 8   Global Physical Health Score : Raw Score 10 (SUM : G03 - G06 - G07 - G08)   Global [place]all Health Score : Raw Score 9 (SUM : G02 - G04 - G05 - G10)   Total (Physical + Mental Health Score) 19   In general, would you say your health is: 3   In general, would you say your quality of life is: 3   In general, how would you rate your physical  health? 3   In general, how would you rate your mental health, including your mood and your ability to think? 2   In general, how would you rate your satisfaction with your social activities and relationships? 2   In general, please rate how well you carry out your usual social activities and roles. (This includes activities at home, at work and in your community, and responsibilities as a parent, child, spouse, employee, friend, etc.) 2   To what extent are you able to carry out your everyday physical activities such as walking, climbing stairs, carrying groceries, or moving a chair? 2   In the past 7 days, how often have you been bothered by emotional problems such as feeling anxious, depressed, or irritable? 2   In the past 7 days, how would you rate your fatigue on average? 3   In the past 7 days, how would you rate your pain on average, where 0 means no pain, and 10 means worst imaginable pain? 8   Global Mental Health Score 9   Global Physical Health Score 10   PROMIS TOTAL - SUBSCORES 19   Pain question re-calculation - no clinical value 2   Some recent data might be hidden

## 2017-08-03 NOTE — PROGRESS NOTES
REASON FOR VISIT: Review EMG and MRI results    REFERRING PHYSICIAN: Referred Self     PRIMARY CARE PHYSICIAN: Mary Ellen Salgado    HISTORY OF PRESENT ILLNESS: Yun Sagastume is a 52 year old female who returns to clinic today for follow-up of her low back and bilateral leg pain.  She was last seen on 6/14/17 and was sent for bilateral lower extremity EMG testing and an MRI of the lumbar spine.  She is here today to review the results and to discuss treatment options.  She hasn't noticed any extreme changes in her symptoms since her last visit, but she does notice symptoms in her left leg more often now.  She states the ratio of back pain to leg pain is 60/40.  She is currently taking Percocet (10/325) four times daily for pain.  She reports an increase in her leg symptoms a few days after the EMG, which prompted a visit to her local emergency department.  She was given prednisone, which helped.  She was also prescribed Lyrica, which she did not fill.     Oswestry score: 42.22% (previously 58%)  [id number]: 19 (previously 19)  Pain scale: 5-10 (previously 6)    PHYSICAL EXAM:   Constitutional - Patient is healthy, well-nourished and appears stated age.    BMI = 30.73    Respiratory - Patient is breathing normally and in no respiratory distress.   Skin - No suspicious rashes or lesions.   Psychiatric - Normal mood and affect.   Eyes - Visual acuity is normal to the written word.   ENT - Hearing intact to the spoken word.   GI - No abdominal distention.   Musculoskeletal - Non-antalgic gait without use of assistive devices.      IMAGING: Flexion/extension films of the lumbar spine were obtained on 6/14/17.  There is no motion.  MRI of the lumbar spine was also reviewed today.  It shows mild neural foraminal stenosis at L5-S1 and L4-5. See full radiologic report in chart.    An EMG of bilateral lower extremities was obtained on 7/3/17.  It revealed no evidence of lumbosacral radiculopathy.  There was mild axonal sensory  polyneuropathy.    CLINICAL ASSESSMENT:   1. Degenerative disc disease of the lumbar spine  2. Mild sagittal malalignment  3. Diabetes mellitis, Type 2  4. Lower extremity polyneuropathy    DISCUSSION/PLAN:   Yun is a 52 year old female who returned to clinic today to discuss the results of her recent EMG and lumbar spine MRI.  We went over these results in detail and explained that her lower extremity pain is a polyneuropathy likely related to her diabetes, as opposed to a lumbosacral radiculopathy.  It was explained that this symptom would not be improved with surgery on the lumbar spine.  We also went over her MRI results, which did not reveal significant stenosis.  There were mild degenerative changes present.  We again discussed her sagittal malalignment, which is certainly abnormal but not extreme.  It was explained that we cannot perform surgery until we have a clearer picture as to the exact source of her pain.  In an attempt to identify the correct pain generator, we recommended a series of selective nerve root blocks with local anesthetic only.  These should be performed on the L4 and L5 nerve roots bilaterally, and they should be  in time by at least two days each.  She was encouraged to aggravate her pain prior to and after the injections.  She should return to clinic to discuss the results.    All questions and concerns were answered to the patient's apparent satisfaction before leaving the clinic.     Thank you for allowing Dr. Tim and I to participate in the care of Yun Sagastume.    Respectfully,  Marleen Walton PA-C    I saw and evaluated the patient on the day of the visit and I formulated the plan.  Chris iTm MD      CC  Copy to patient    [address] [facility] [address]    Answers for HPI/ROS submitted by the patient on 8/3/2017   General Symptoms: No  Skin Symptoms: No  HENT Symptoms: No  EYE SYMPTOMS: No  HEART SYMPTOMS: Yes  LUNG SYMPTOMS: No  INTESTINAL  SYMPTOMS: No  URINARY SYMPTOMS: No  GYNECOLOGIC SYMPTOMS: No  BREAST SYMPTOMS: No  SKELETAL SYMPTOMS: Yes  BLOOD SYMPTOMS: No  NERVOUS SYSTEM SYMPTOMS: Yes  MENTAL HEALTH SYMPTOMS: Yes  Chest pain or pressure: No  Fast or irregular heartbeat: No  Pain in legs with walking: Yes  Swelling in feet or ankles: No  Trouble breathing while lying down: No  Fingers or Toes appear blue: Yes  High blood pressure: Yes  Low blood pressure: No  Fainting: No  Murmurs: No  Chest pain on exertion: No  Chest pain at rest: No  Cramping pain in leg during exercise: No  Pacemaker: No  Varicose veins: No  Edema or swelling: No  Fast heart beat: No  Wake up at night with shortness of breath: No  Heart flutters: No  Light-headedness: No  Exercise intolerance: Yes  Back pain: Yes  Muscle aches: Yes  Neck pain: No  Swollen joints: No  Joint pain: Yes  Bone pain: No  Muscle cramps: Yes  Muscle weakness: No  Joint stiffness: Yes  Bone fracture: No  Trouble with coordination: Yes  Dizziness or trouble with balance: Yes  Fainting or black-out spells: No  Memory loss: No  Headache: No  Seizures: No  Speech problems: No  Tingling: Yes  Tremor: Yes  Weakness: No  Difficulty walking: Yes  Paralysis: No  Numbness: Yes  Nervous or Anxious: Yes  Depression: Yes  Trouble sleeping: Yes  Trouble thinking or concentrating: Yes  Mood changes: Yes  Panic attacks: No

## 2017-08-03 NOTE — MR AVS SNAPSHOT
After Visit Summary   8/3/2017    Yun Sagastume    MRN: [id number]           Patient Information     Date Of Birth          1965        Visit Information        Provider Department      8/3/2017 11:45 AM Chris Tim MD [facility]        Today's Diagnoses     Chronic midline low back pain with right-sided sciatica    -  1       Follow-ups after your visit        Additional Services     [facility]       Your provider has referred you to: [facility]. Please call [phone] to make an appointment.     Clinic is located: [facility] [address] [place] [address] [id number] 4th [place] [address]      Please complete the following questions:    Procedure/Referral: Procedure Only -  Procedure or injection only - please call the [facility] at [phone] to schedule: Block: Other: Lumbar nerve roots, injections should be  in time by at least two days.     What is your diagnosis for the patient's pain? Low back pain    What are your specific questions for the pain specialist? None    Are there any red flags that may impact the assessment or management of the patient? None    REGARDING OPIOID MEDICATIONS:  We will always address appropriateness of opioid pain medications. We do not prescribe on the patient's first visit and generally will not take on a prescribing role for stable chronic pain. When we do take on prescribing of opioids for chronic pain, it is in collaboration with the referring physician for an determined period of time (usually weeks to months), with an expectation that the primary physician or provider will assume the prescribing role if medications are effective at stable doses with demonstrated compliance.  Therefore, please do not assume that your prescribing responsibilities end on the day of pain clinic consultation.  Is this agreeable to you?  YES      Please be aware that coverage of these services is subject to the terms and limitations of your health insurance plan.  Call member services at your health plan with any benefit or coverage questions.      Please bring the following with you to your appointment or have sent to the [facility]:    (1) Any X-Rays, CTs or MRIs which have been performed that are not in Epic.  Contact the facility where they were done to arrange for  prior to your scheduled appointment.  Any new CT, MRI or other procedures ordered by your specialist must be performed at a [facility] facility or coordinated by your clinic's referral office.    (2) List of current medications   (3) This referral request   (4) Any documents/labs given to you for this referral                  Your next 10 appointments already scheduled     Aug 14, 2017  9:30 AM CDT   XR LUMBAR NERVE BLOCK INJ SYMPATHETIC with MARIA LXR3, MARIA L IMAGING NURSE, MARIA L NEURO RAD   [facility] ([facility])    [address] [address] [phone]           For nerve root injection, please send or bring copies of any MRIs or other scans you have had.  Bring a list of your current medicines to your exam. (Include vitamins, minerals and over-the-counter medicines.) Leave your valuables at home.  Plan to have someone drive you home afterward.  Stop taking the following medicines (but talk to your doctor first):   If you take blood thinners, you may need to stop taking them a few days before treatment. Talk to your doctor before stopping these medicines.Stop taking Coumadin (warfarin) 3 days before treatment. Restart the day after treatment.   If you take Plavix, Ticlid, Pletal or Persantine, please ask your doctor if you should stop these medicines. You may need extra tests on the morning of your scan. You may take your other medicines as normal.  Stop all food and drink (including water) 3 hours before  your test or treatment.  Please tell the doctor:   If you are allergic to X-ray dye (contrast fluid).   If you may be pregnant.  Injections take about 30 to 45 minutes. Most people spend up to 2 hours in the clinic or hospital.  Please call the Imaging Department at your exam site with any questions              Future tests that were ordered for you today     Open Future Orders        Priority Expected Expires Ordered    XR Lumbar Nerve Block Inj Sympathetic Routine 8/3/2017 8/3/2018 8/3/2017    XR Lumbar Nerve Block Inj Sympathetic Routine 8/3/2017 8/3/2018 8/3/2017    XR Lumbar Nerve Block Inj Sympathetic Routine 8/3/2017 8/3/2018 8/3/2017    XR Lumbar Nerve Block Inj Sympathetic Routine 8/3/2017 8/3/2018 8/3/2017            Who to contact     Please call your clinic at [phone] to:    Ask questions about your health    Make or cancel appointments    Discuss your medicines    Learn about your test results    Speak to your doctor   If you have compliments or concerns about an experience at your clinic, or if you wish to file a complaint, please contact [facility] Patient Relations at [phone] or email us at [email or web address]         Additional Information About Your Visit        [employer] Information     [employer] is an electronic gateway that provides easy, online access to your medical records. With [employer], you can request a clinic appointment, read your test results, renew a prescription or communicate with your care team.     To sign up for [employer] visit the website at [email or web address]   You will be asked to enter the access code listed below, as well as some personal information. Please follow the directions to create your username and password.     Your access code is: [email or web address]  Expires: 2017  6:30 AM     Your access code will  in 90 days. If you need help or a new code, please contact your [facility] or  "call [phone] for assistance.        Care EveryWhere ID     This is your Care EveryWhere ID. This could be used by other organizations to access your [facility] medical records  [id number]        Your Vitals Were     Height BMI (Body Mass Index)                1.7 m (5' 6.93\") 30.73 kg/m2           Blood Pressure from Last 3 Encounters:   No data found for BP    Weight from Last 3 Encounters:   08/03/17 88.8 kg (195 lb 12.8 oz)   06/14/17 91.6 kg (202 lb)   08/25/16 92.6 kg (204 lb 3.2 oz)              We Performed the Following     [facility]        Primary Care Provider Office Phone # Fax #    Mary Ellen Salgado  [phone]       [address] [address]        Equal Access to Services     [facility]: Hadii aad ku hadasho Soomaali, waaxda luqadaha, qaybta kaalmada adeegyada, patience harrisin haymary geller . So [facility] [phone].    ATENCIÓN: Si habla español, tiene a grove disposición servicios gratuitos de asistencia lingüística. Llame al [phone].    We comply with applicable federal civil rights laws and [place] laws. We do not discriminate on the basis of race, color, national origin, age, disability sex, sexual orientation or gender identity.            Thank you!     Thank you for choosing [facility]  for your care. Our goal is always to provide you with excellent care. Hearing back from our patients is one way we can continue to improve our services. Please take a few minutes to complete the written survey that you may receive in the mail after your visit with us. Thank you!             Your Updated Medication List - Protect others around you: Learn how to safely use, store and throw away your medicines at www.disposemymeds.org.          This list is accurate as of: 8/3/17  2:14 PM.  Always use your most recent med list.                   Brand Name Dispense Instructions for use Diagnosis    " cyclobenzaprine 10 MG tablet    FLEXERIL     Take 10 mg by mouth        lisinopril-hydrochlorothiazide 10-12.5 MG per tablet    PRINZIDE/ZESTORETIC     Take 1 tablet by mouth daily        OMEPRAZOLE PO      Take 20 mg by mouth every morning        oxyCODONE-acetaminophen 7.5-325 MG per tablet    PERCOCET     Take 1 tablet by mouth        PAXIL 40 MG tablet   Generic drug:  PARoxetine      Take 40 mg by mouth

## 2017-08-03 NOTE — LETTER
8/3/2017       RE: Yun Sagastume  [address] [place] [address]     Dear Colleague,    Thank you for referring your patient, Yun Sagastume, to the [facility] at [facility]. Please see a copy of my visit note below.    REASON FOR VISIT: Review EMG and MRI results    REFERRING PHYSICIAN: Referred Self     PRIMARY CARE PHYSICIAN: Mary Ellen Salgado    HISTORY OF PRESENT ILLNESS: Yun Sagastume is a 52 year old female who returns to clinic today for follow-up of her low back and bilateral leg pain.  She was last seen on 6/14/17 and was sent for bilateral lower extremity EMG testing and an MRI of the lumbar spine.  She is here today to review the results and to discuss treatment options.  She hasn't noticed any extreme changes in her symptoms since her last visit, but she does notice symptoms in her left leg more often now.  She states the ratio of back pain to leg pain is 60/40.  She is currently taking Percocet (10/325) four times daily for pain.  She reports an increase in her leg symptoms a few days after the EMG, which prompted a visit to her local emergency department.  She was given prednisone, which helped.  She was also prescribed Lyrica, which she did not fill.     Oswestry score: 42.22% (previously 58%)  Cnsrwv30: 19 (previously 19)  Pain scale: 5-10 (previously 6)    PHYSICAL EXAM:   Constitutional - Patient is healthy, well-nourished and appears stated age.    BMI = 30.73    Respiratory - Patient is breathing normally and in no respiratory distress.   Skin - No suspicious rashes or lesions.   Psychiatric - Normal mood and affect.   Eyes - Visual acuity is normal to the written word.   ENT - Hearing intact to the spoken word.   GI - No abdominal distention.   Musculoskeletal - Non-antalgic gait without use of assistive devices.    IMAGING: Flexion/extension films of the lumbar spine were obtained on 6/14/17.  There is no motion.  MRI of the lumbar  spine was also reviewed today.  It shows mild neural foraminal stenosis at L5-S1 and L4-5. See full radiologic report in chart.    An EMG of bilateral lower extremities was obtained on 7/3/17.  It revealed no evidence of lumbosacral radiculopathy.  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    CLINICAL ASSESSMENT:   1. Degenerative disc disease of the lumbar spine  2. Mild sagittal malalignment  3. Diabetes mellitis, Type 2  4. Lower extremity polyneuropathy    DISCUSSION/PLAN:   Yun is a 52 year old female who returned to clinic today to discuss the results of her recent EMG and lumbar spine MRI.  We went over these results in detail and explained that her lower extremity pain is a polyneuropathy likely related to her diabetes, as opposed to a lumbosacral radiculopathy.  It was explained that this symptom would not be improved with surgery on the lumbar spine.  We also went over her MRI results, which did not reveal significant stenosis.  There were mild degenerative changes present.  We again discussed her sagittal malalignment, which is certainly abnormal but not extreme.  It was explained that we cannot perform surgery until we have a clearer picture as to the exact source of her pain.  In an attempt to identify the correct pain generator, we recommended a series of selective nerve root blocks with local anesthetic only.  These should be performed on the L4 and L5 nerve roots bilaterally, and they should be  in time by at least two days each.  She was encouraged to aggravate her pain prior to and after the injections.  She should return to clinic to discuss the results.    All questions and concerns were answered to the patient's apparent satisfaction before leaving the clinic.     Thank you for allowing Dr. Tim and I to participate in the care of Yun Sagastume.    Respectfully,  Marleen Walton PA-C    I saw and evaluated the patient on the day of the visit and I formulated the  plan.  Chris Tim MD    CC: Copy to patient  [address] [place] [address]

## 2017-08-04 ENCOUNTER — CARE COORDINATION (OUTPATIENT)
Dept: ANESTHESIOLOGY | Facility: CLINIC | Age: 52
End: 2017-08-04

## 2017-08-04 ENCOUNTER — PRE VISIT (OUTPATIENT)
Dept: ANESTHESIOLOGY | Facility: CLINIC | Age: 52
End: 2017-08-04

## 2017-08-04 NOTE — TELEPHONE ENCOUNTER
1.  Date/reason for appt: 8/14/17 - Chronic Back Pain  2.  Referring provider: Dr. Tim  3.  Call to patient (Yes / No - short description): no, pt is referred  4.  Previous care at / records requested from:   - [facility]: Records and imaging in epic/pacs   - [facility]: Records received, will forward to clinic   - [facility]: Records received, will forward to clinic   - [facility]: Records received, will forwrad to clinic

## 2017-08-10 DIAGNOSIS — M54.41 CHRONIC MIDLINE LOW BACK PAIN WITH RIGHT-SIDED SCIATICA: Primary | ICD-10-CM

## 2017-08-10 DIAGNOSIS — G89.29 CHRONIC MIDLINE LOW BACK PAIN WITH RIGHT-SIDED SCIATICA: Primary | ICD-10-CM

## 2017-08-14 NOTE — PROGRESS NOTES
LPN read through the Pre-procedure instructions with pt.   Pt verbalized understanding to holding appropriate medication per protocol, and was agreeable to NPO policy and needing a .    Instructions printed and sent to pt's email. Pt asked to read through the instructions again after receiving them, and contact the clinic with questions.     Pt was agreeable.   Barb Marquez LPN

## 2017-08-14 NOTE — LETTER
Dear Yun,   Below are the instruction for your upcoming procedure with Dr. Toure:  Scheduled for Thursday 8/17/17 at 10:45 am. Please check in at 09:45 am.    Your procedure: Lumbar Nerve Root Injection.    On the day of the procedure  1. Arrive 1 hour earlier than your scheduled time, to the [facility]  Address: [address] [place] [id number]  2. Check in on the 5th floor for your procedure    A nurse will call you 2 weeks after your procedure to follow up.    Patient Pre-Procedure Instructions    CAUTION - FAILURE TO FOLLOW THESE PRE-PROCEDURE INSTRUCTIONS WILL RESULT IN YOUR PROCEDURE BEING RESCHEDULED.      Pregnancy  If you are pregnant, or think that you may be pregnant, please notify our staff. This may or may not affect the ability to perform the procedure.     You MUST have a  TO TAKE YOU HOME after your procedure. Transportation by Taxi or Para-transit must have a responsible adult accompany you home (other than the ). Travel by bus or light rail is not acceptable transportation. You must provide your 's full name and contact number at time of check in.   Fasting Protocol You may have NOTHING SOLID TO EAT FOR 8 HOURS prior to arrival at the procedure area.   Broth and candy are considered solid food and require an eight hour fast.   You may have CLEAR LIQUIDS UP TO 2 HOURS prior to arrival at the clinic.   Clear liquids include water, clear fruit juice (no pulp) carbonated beverages, ice, black coffee, black tea (no milk or cream), chewing gum (un-swallowed), and/or clear jello (no fruit or milk). No alcohol containing beverages.   Medications If you take any medications,  DO NOT STOP. Take your medications as usual the morning of your procedure with a sip of water AT LEAST 2 HOURS PRIOR TO ARRIVAL.    Antibiotics If you are currently taking antibiotics, you must complete the entire dose 7 days prior to your scheduled procedure. You must  be clear of any signs or symptoms of infection. If you begin antibiotics, please contact our clinic for instructions.   Fever, Chills, or Rash If you experience a fever of higher than 100 degrees, chills, rash, or open wounds during the one week prior to your procedure, please call the clinic.         Medication Hold List  **Patients under Cardiology/Neurology care should consult their provider prior to the pain procedure to verify pre-procedure medication instructions. The information below contains general guidelines.**    Blood Thinners If you are taking daily ASPIRIN, PLAVIX, OR OTHER BLOOD THINNERS SUCH AS COUMADIN/WARFARIN, we will need your prescribing doctor to sign a release permitting you to stop these medications. Once approved by your prescribing doctor - STOP ALL BLOOD THINNERS BASED ON THE TIME TABLE BELOW PRIOR TO YOUR PROCEDURE. If you have been instructed to stop WARFARIN(COUMADIN), you must have an INR lab drawn the day before your procedure. . Your INR must be within normal limits before we can perform your injection. MEDICATIONS CAN BE RESTARTED AFTER YOUR PROCEDURE.    14 DAY HOLD  Ticlid (ticlopidine)    10 DAY HOLD  Effient (Prasugel)    3 DAY HOLD  Xarelto (rivaroxaban) 7 DAY HOLD  Anacin, Bufferin, Ecotrin, Excedrin, Aggrenox (Aspirin)  Brilinta (ticagrelor)  Coumadin (Warfarin)  Pradexa (Dabigatran)  Elmiron (Pentosan)  Plavix (Clopidogrel Bisulfate)  Pletal (Cilostazol)    24 DAY HOLD  Lovenox (enoxaparin)  Agrylin (Anagrelide)        Non-steroidal Anti-inflammatories (NSAIDs) DO NOT TAKE any non-steroidal anti-inflammatory medications (NSAIDs) listed on the table below. MEDICATIONS CAN BE RESTARTED AFTER YOUR PROCEDURE. Celebrex is OK to take and does not need to be discontinued.     Medications to stop:  3 DAY HOLD  Advil, Motrin (Ibuprofen)  Arthrotec (diciofenac sodium/misoprostol)  Clinoril (Sulindac)  Indocin (Indomethacin)  Lodine (Etodolac)  Toradol (Ketorolac)  Vicoprofen  (Hydrocodone and Ibuprofen)  Voltaren (Diclotenac)    14 DAY HOLD  Daypro (Oxaprozin)  Feldene (Piroxicam)   7 DAY HOLD  Aleve (Naproxen sodium)  Darvon compound (contains aspirin)  Naprosyn (Naproxen)  Norgesic Forte (contains aspirin)  Mobic (Meloxicam)  Oruvall (Ketoprofen)  Percodan (contains aspirin)  Relafen (Nabumetone)  Salsalate  Trilisate  Vitamin E (more than 400 mg per day)  Any medication containing aspirin              If you need to reschedule you procedure- please call Tamar at [phone]  She is available Monday - Friday from 9-5:30pm, if she is unavailable to take your call, please leave her a voice message with your name, birth date, and phone number and she will call you back.    If you must reschedule your procedure more than two times, you must follow up in clinic before rescheduling again.    To speak with a nurse, regarding questions about the instructions please call [phone]     You can also reach a nurse by Cathie: [email or web address]    Thank you,   [facility].  [phone]

## 2017-08-17 ENCOUNTER — SURGERY (OUTPATIENT)
Age: 52
End: 2017-08-17

## 2017-08-17 ENCOUNTER — HOSPITAL ENCOUNTER (OUTPATIENT)
Facility: AMBULATORY SURGERY CENTER | Age: 52
End: 2017-08-17
Attending: ANESTHESIOLOGY

## 2017-08-17 VITALS
HEART RATE: 102 BPM | OXYGEN SATURATION: 100 % | RESPIRATION RATE: 16 BRPM | SYSTOLIC BLOOD PRESSURE: 121 MMHG | DIASTOLIC BLOOD PRESSURE: 89 MMHG | TEMPERATURE: 97.6 F

## 2017-08-17 RX ORDER — LIDOCAINE HYDROCHLORIDE 10 MG/ML
INJECTION, SOLUTION EPIDURAL; INFILTRATION; INTRACAUDAL; PERINEURAL PRN
Status: DISCONTINUED | OUTPATIENT
Start: 2017-08-17 | End: 2017-08-17 | Stop reason: HOSPADM

## 2017-08-17 RX ORDER — BUPIVACAINE HYDROCHLORIDE 5 MG/ML
1 INJECTION, SOLUTION EPIDURAL; INTRACAUDAL ONCE
Status: COMPLETED | OUTPATIENT
Start: 2017-08-17 | End: 2017-08-17

## 2017-08-17 RX ORDER — IOPAMIDOL 408 MG/ML
INJECTION, SOLUTION INTRATHECAL PRN
Status: DISCONTINUED | OUTPATIENT
Start: 2017-08-17 | End: 2017-08-17 | Stop reason: HOSPADM

## 2017-08-17 RX ORDER — FLUMAZENIL 0.1 MG/ML
0.2 INJECTION, SOLUTION INTRAVENOUS
Status: DISCONTINUED | OUTPATIENT
Start: 2017-08-17 | End: 2017-08-18 | Stop reason: HOSPADM

## 2017-08-17 RX ORDER — FENTANYL CITRATE 50 UG/ML
25-50 INJECTION, SOLUTION INTRAMUSCULAR; INTRAVENOUS EVERY 5 MIN PRN
Status: DISCONTINUED | OUTPATIENT
Start: 2017-08-17 | End: 2017-08-18 | Stop reason: HOSPADM

## 2017-08-17 RX ORDER — NALOXONE HYDROCHLORIDE 0.4 MG/ML
.1-.4 INJECTION, SOLUTION INTRAMUSCULAR; INTRAVENOUS; SUBCUTANEOUS
Status: DISCONTINUED | OUTPATIENT
Start: 2017-08-17 | End: 2017-08-18 | Stop reason: HOSPADM

## 2017-08-17 RX ADMIN — LIDOCAINE HYDROCHLORIDE 8 ML: 10 INJECTION, SOLUTION EPIDURAL; INFILTRATION; INTRACAUDAL; PERINEURAL at 12:22

## 2017-08-17 RX ADMIN — BUPIVACAINE HYDROCHLORIDE 10 ML: 5 INJECTION, SOLUTION EPIDURAL; INTRACAUDAL at 12:21

## 2017-08-17 RX ADMIN — IOPAMIDOL 2 ML: 408 INJECTION, SOLUTION INTRATHECAL at 12:22

## 2017-08-17 RX ADMIN — FENTANYL CITRATE 50 MCG: 50 INJECTION, SOLUTION INTRAMUSCULAR; INTRAVENOUS at 12:14

## 2017-08-17 NOTE — IP AVS SNAPSHOT
MRN:[id number]                      After Visit Summary   8/17/2017    Yun Sagastume    MRN: [id number]           Thank you!     Thank you for choosing [facility] for your care. Our goal is always to provide you with excellent care. Hearing back from our patients is one way we can continue to improve our services. Please take a few minutes to complete the written survey that you may receive in the mail after you visit with us. Thank you!        Patient Information     Date Of Birth          1965        About your hospital stay     You were admitted on:  August 17, 2017 [facility]    You were discharged on:  August 17, 2017       Who to Call     For medical emergencies, please call 911.  For non-urgent questions about your medical care, please call your primary care provider or clinic, [phone]  For questions related to your surgery, please call your surgery clinic        Attending Provider     Provider Ney Quinn MD Anesthesiology       Primary Care Provider Office Phone # Fax #    Mary Ellen SHIRIN Salgado [phone]      Further instructions from your care team       Home Care Instructions after an Epidural Steroid Pain Injection    A lumbar injection delivers medication directly into the area that may be causing your lower back pain and/or leg pain.   Activity  -Rest today  -Do not work today  -Resume normal activity tomorrow  -DO NOT shower for 24 hours  -DO NOT remove bandaid for 24 hours    Pain  -You may experience soreness at the injection site for one or two days  -You may use an ice pack for 20 minutes every 2 hours for the first 24 hours  -You may use a heating pad after the first 24 hours  -You may use Tylenol (acetaminophen) every 4 hours or other pain medicines as     directed by your physician    You may experience numbness radiating into your legs or arms (depending on the  procedure location). This numbness may last several hours. Until sensation returns to normal; please use caution in walking, climbing stairs, and stepping out of your vehicle, etc.    DID YOU RECEIVE SEDATION TODAY?  No    Safety  Sedation medicine, if given, may remain active for many hours. It is important for the next 24 hours that you do not:  -Drive a car  -Operate machines or power tools  -Consume alcohol, including beer  -Sign any important papers or legal documents      Please contact us if you have:  -Severe pain  -Fever more than 101.5 degrees Fahrenheit  -Signs of infection at the injection site (redness, swelling, or drainage)    If you have questions, please contact our office at [phone] between the hours of 7:00 am and 3:00 pm Monday through Friday. After office hours you can contact the on call provider by dialing [phone]. If you need immediate attention, we recommend that you go to a hospital emergency room or dial 911.      Pending Results     No orders found from 8/15/2017 to 2017.            Admission Information     Date & Time Provider Department Dept. Phone    2017 Ney Toure MD [facility] [phone]      Your Vitals Were     Blood Pressure Temperature Respirations Pulse Oximetry          106/73 97.6  F (36.4  C) (Tympanic) 12 100%        MyChart Information     [employer] is an electronic gateway that provides easy, online access to your medical records. With [employer], you can request a clinic appointment, read your test results, renew a prescription or communicate with your care team.     To sign up for [employer] visit the website at [email or web address]   You will be asked to enter the access code listed below, as well as some personal information. Please follow the directions to create your username and password.     Your access code is: [email or web address]  Expires: 2017  6:30 AM     Your access code will  in 90  days. If you need help or a new code, please contact your [facility] or call [phone] for assistance.        Care EveryWhere ID     This is your Care EveryWhere ID. This could be used by other organizations to access your [facility] medical records  [id number]        Equal Access to Services     ASH TREVINO : Sri joshi hadkatieo Soomaali, waaxda luqadaha, qaybta kaalmada adeegyada, patience acostadavidkathryn montgomery. So [facility] [phone].    ATENCIÓN: Si habla español, tiene a grove disposición servicios gratuitos de asistencia lingüística. Radha al [phone].    We comply with applicable federal civil rights laws and [place] laws. We do not discriminate on the basis of race, color, national origin, age, disability sex, sexual orientation or gender identity.               Review of your medicines      UNREVIEWED medicines. Ask your doctor about these medicines        Dose / Directions    cyclobenzaprine 10 MG tablet   Commonly known as:  FLEXERIL        Dose:  10 mg   Take 10 mg by mouth   Refills:  0       lisinopril-hydrochlorothiazide 10-12.5 MG per tablet   Commonly known as:  PRINZIDE/ZESTORETIC        Dose:  1 tablet   Take 1 tablet by mouth daily   Refills:  0       OMEPRAZOLE PO        Dose:  20 mg   Take 20 mg by mouth every morning   Refills:  0       oxyCODONE-acetaminophen 7.5-325 MG per tablet   Commonly known as:  PERCOCET        Dose:  1 tablet   Take 1 tablet by mouth   Refills:  0       PAXIL 40 MG tablet   Generic drug:  PARoxetine        Dose:  40 mg   Take 40 mg by mouth   Refills:  0                Protect others around you: Learn how to safely use, store and throw away your medicines at www.disposemymeds.org.             Medication List: This is a list of all your medications and when to take them. Check marks below indicate your daily home schedule. Keep this list as a reference.      Medications           Morning Afternoon Evening Bedtime As  Needed    cyclobenzaprine 10 MG tablet   Commonly known as:  FLEXERIL   Take 10 mg by mouth                                lisinopril-hydrochlorothiazide 10-12.5 MG per tablet   Commonly known as:  PRINZIDE/ZESTORETIC   Take 1 tablet by mouth daily                                OMEPRAZOLE PO   Take 20 mg by mouth every morning                                oxyCODONE-acetaminophen 7.5-325 MG per tablet   Commonly known as:  PERCOCET   Take 1 tablet by mouth                                PAXIL 40 MG tablet   Take 40 mg by mouth   Generic drug:  PARoxetine

## 2017-08-17 NOTE — DISCHARGE INSTRUCTIONS
Home Care Instructions after an Epidural Steroid Pain Injection    A lumbar injection delivers medication directly into the area that may be causing your lower back pain and/or leg pain.   Activity  -Rest today  -Do not work today  -Resume normal activity tomorrow  -DO NOT shower for 24 hours  -DO NOT remove bandaid for 24 hours    Pain  -You may experience soreness at the injection site for one or two days  -You may use an ice pack for 20 minutes every 2 hours for the first 24 hours  -You may use a heating pad after the first 24 hours  -You may use Tylenol (acetaminophen) every 4 hours or other pain medicines as     directed by your physician    You may experience numbness radiating into your legs or arms (depending on the procedure location). This numbness may last several hours. Until sensation returns to normal; please use caution in walking, climbing stairs, and stepping out of your vehicle, etc.    DID YOU RECEIVE SEDATION TODAY?  No    Safety  Sedation medicine, if given, may remain active for many hours. It is important for the next 24 hours that you do not:  -Drive a car  -Operate machines or power tools  -Consume alcohol, including beer  -Sign any important papers or legal documents      Please contact us if you have:  -Severe pain  -Fever more than 101.5 degrees Fahrenheit  -Signs of infection at the injection site (redness, swelling, or drainage)    If you have questions, please contact our office at [phone] between the hours of 7:00 am and 3:00 pm Monday through Friday. After office hours you can contact the on call provider by dialing [phone]. If you need immediate attention, we recommend that you go to a hospital emergency room or dial [phone].

## 2017-08-17 NOTE — OR NURSING
Dr. Toure assessed patient prior to discharge.  Patient able to lift legs freely against gravity.  Left leg strong against resistance and right leg with moderate strength against resistance at time of discharge.  Patient eager to discharge with family.  RN reminded patient of contact information if any problems arise.

## 2017-08-17 NOTE — IP AVS SNAPSHOT
[facility]    [address] [place] [address]    Phone:  [phone]    Fax:  [phone]                                       After Visit Summary   8/17/2017    Yun Sagastume    MRN: [id number]           After Visit Summary Signature Page     I have received my discharge instructions, and my questions have been answered. I have discussed any challenges I see with this plan with the nurse or doctor.    ..........................................................................................................................................  Patient/Patient Representative Signature      ..........................................................................................................................................  Patient Representative Print Name and Relationship to Patient    ..................................................               ................................................  Date                                            Time    ..........................................................................................................................................  Reviewed by Signature/Title    ...................................................              ..............................................  Date                                                            Time

## 2017-08-18 NOTE — OP NOTE
PROCEDURE: BILATERAL L4 DIAGNOSTIC SELECTIVE NERVE ROOT BLOCK    DIAGNOSIS: lumbar radiculopathy    DESCRIPTION OF PROCEDURE:    The patient was brought to the fluoroscopy suite. IV access was obtained prior to the procedure. The patient was positioned prone on the fluoroscopy table. Continuous hemodynamic monitoring was initiated including blood pressure, EKG, and pulse oximetry. IV sedation was administered incrementally to allow the patient to remain comfortable and conversant throughout the procedure. The skin was prepped with Betadine three times and draped in a sterile fashion. Skin anesthesia was achieved using 2 mL of lidocaine 1% over the respective injection site.     An oblique fluoroscopic view was obtained, with the superior articular process of the inferior vertebral body aligned with the pedicle. The tip of a 22-gauge 5-inch Quincke-type spinal needle was advanced toward the 6 o clock position of the RIGHT pedicle under intermittent fluoroscopic guidance. Confirmation of proper needle position was made with AP, oblique, and lateral fluoroscopic views. Negative aspiration for blood or CSF was confirmed. 1 mL of contrast was injected.  Live fluoroscopy revealed a clear outline of the spinal nerve with proximal spread of agent through the neural foramen into the anterior epidural space. A total of 5 mL of 0.5% bupivacaine was injected. There was no pain on injection. The needle was removed and bleeding was nil.  A sterile dressing was applied. The above procedure was repeated on the LEFT side.    The procedure was performed without complications.    Please see Nursing Documentation for pre-procedure and post-procedure Pain Scores.

## 2017-08-23 ENCOUNTER — CARE COORDINATION (OUTPATIENT)
Dept: ANESTHESIOLOGY | Facility: CLINIC | Age: 52
End: 2017-08-23

## 2017-08-23 DIAGNOSIS — M54.16 LUMBAR RADICULOPATHY: Primary | ICD-10-CM

## 2017-08-23 NOTE — PROGRESS NOTES
Yun is calling to schedule an L5 nerve root block. The bilateral L4 nerve root block did not provide any pain relief at all. She reports both her legs being numb, but states there was no pain relief.     Dr. Toure updated. Okay to proceed with the L5 nerve root block.    Yun will schedule her next procedure with Dr. Carr, as he has the soonest available appointment open.    Dr. Carr updated.

## 2017-08-30 ENCOUNTER — SURGERY (OUTPATIENT)
Age: 52
End: 2017-08-30

## 2017-08-30 ENCOUNTER — HOSPITAL ENCOUNTER (OUTPATIENT)
Facility: AMBULATORY SURGERY CENTER | Age: 52
End: 2017-08-30

## 2017-08-30 VITALS
BODY MASS INDEX: 28.04 KG/M2 | RESPIRATION RATE: 12 BRPM | WEIGHT: 185 LBS | OXYGEN SATURATION: 97 % | HEIGHT: 68 IN | HEART RATE: 71 BPM | DIASTOLIC BLOOD PRESSURE: 70 MMHG | TEMPERATURE: 98 F | SYSTOLIC BLOOD PRESSURE: 110 MMHG

## 2017-08-30 RX ORDER — LIDOCAINE HYDROCHLORIDE 10 MG/ML
INJECTION, SOLUTION EPIDURAL; INFILTRATION; INTRACAUDAL; PERINEURAL PRN
Status: DISCONTINUED | OUTPATIENT
Start: 2017-08-30 | End: 2017-08-30 | Stop reason: HOSPADM

## 2017-08-30 RX ORDER — METHYLPREDNISOLONE ACETATE 80 MG/ML
INJECTION, SUSPENSION INTRA-ARTICULAR; INTRALESIONAL; INTRAMUSCULAR; SOFT TISSUE PRN
Status: DISCONTINUED | OUTPATIENT
Start: 2017-08-30 | End: 2017-08-30 | Stop reason: HOSPADM

## 2017-08-30 RX ADMIN — METHYLPREDNISOLONE ACETATE 80 MG: 80 INJECTION, SUSPENSION INTRA-ARTICULAR; INTRALESIONAL; INTRAMUSCULAR; SOFT TISSUE at 12:37

## 2017-08-30 RX ADMIN — LIDOCAINE HYDROCHLORIDE 8 ML: 10 INJECTION, SOLUTION EPIDURAL; INFILTRATION; INTRACAUDAL; PERINEURAL at 12:38

## 2017-08-30 NOTE — IP AVS SNAPSHOT
[facility]    [address] [place] [address]    Phone:  [phone]    Fax:  [phone]                                       After Visit Summary   8/30/2017    Yun Sagastume    MRN: [id number]           After Visit Summary Signature Page     I have received my discharge instructions, and my questions have been answered. I have discussed any challenges I see with this plan with the nurse or doctor.    ..........................................................................................................................................  Patient/Patient Representative Signature      ..........................................................................................................................................  Patient Representative Print Name and Relationship to Patient    ..................................................               ................................................  Date                                            Time    ..........................................................................................................................................  Reviewed by Signature/Title    ...................................................              ..............................................  Date                                                            Time

## 2017-08-30 NOTE — DISCHARGE INSTRUCTIONS
"Home Care Instructions after an Epidural Steroid Pain Injection    A lumbar epidural steroid injection delivers steroid medication directly into the area that may be causing your lower back pain and/or leg pain. A cervical or thoracic epidural steroid injection delivers steroids into the epidural space surrounding spinal nerve roots to help alleviate pain in the upper spine/neck.    Activity  -Rest today  -Do not work today  -Resume normal activity tomorrow  -DO NOT shower for 24 hours  -DO NOT remove bandaid for 24 hours    Pain  -You may experience soreness at the injection site for one or two days  -You may use an ice pack for 20 minutes every 2 hours for the first 24 hours  -You may use a heating pad after the first 24 hours  -You may use Tylenol (acetaminophen) every 4 hours or other pain medicines as     directed by your physician    You may experience numbness radiating into your legs or arms (depending on the procedure location). This numbness may last several hours. Until sensation returns to normal; please use caution in walking, climbing stairs, and stepping out of your vehicle, etc.    DID YOU RECEIVE SEDATION TODAY?  {YES / NO:280539::\"Yes\"}    Safety  Sedation medicine, if given, may remain active for many hours. It is important for the next 24 hours that you do not:  -Drive a car  -Operate machines or power tools  -Consume alcohol, including beer  -Sign any important papers or legal documents      Common side effects of steroids:  Not everyone will experience corticosteroid side effects. If side effects are experienced, they will gradually subside in the 7-10 day period following an injection. Most common side effects include:  -Flushed face and/or chest  -Feeling of warmth, particularly in the face but could be an overall feeling of warmth  -Increased blood sugar in diabetic patients  -Menstrual irregularities my occur. If taking hormone-based birth control an alternate method of birth control is " recommended  -Sleep disturbances and/or mood swings are possible  -Leg cramps    Please contact us if you have:  -Severe pain  -Fever more than 101.5 degrees Fahrenheit  -Signs of infection at the injection site (redness, swelling, or drainage)    If you have questions, please contact our office at [phone] between the hours of 7:00 am and 3:00 pm Monday through Friday. After office hours you can contact the on call provider by dialing [phone]. If you need immediate attention, we recommend that you go to a hospital emergency room or dial 112.

## 2017-08-30 NOTE — IP AVS SNAPSHOT
MRN:[id number]                      After Visit Summary   8/30/2017    Yun Sagastume    MRN: [id number]           Thank you!     Thank you for choosing [facility] for your care. Our goal is always to provide you with excellent care. Hearing back from our patients is one way we can continue to improve our services. Please take a few minutes to complete the written survey that you may receive in the mail after you visit with us. Thank you!        Patient Information     Date Of Birth          1965        About your hospital stay     You were admitted on:  August 30, 2017 [facility]    You were discharged on:  August 30, 2017       Who to Call     For medical emergencies, please call 911.  For non-urgent questions about your medical care, please call your primary care provider or clinic, [phone]  For questions related to your surgery, please call your surgery clinic        Attending Provider     Provider Specialty    Pedrito Carr MD Anesthesiology       Primary Care Provider Office Phone # Fax #    Mary Ellen SHIRIN Salgado [phone]      Further instructions from your care team       Home Care Instructions after an Epidural Steroid Pain Injection    A lumbar epidural steroid injection delivers steroid medication directly into the area that may be causing your lower back pain and/or leg pain. A cervical or thoracic epidural steroid injection delivers steroids into the epidural space surrounding spinal nerve roots to help alleviate pain in the upper spine/neck.    Activity  -Rest today  -Do not work today  -Resume normal activity tomorrow  -DO NOT shower for 24 hours  -DO NOT remove bandaid for 24 hours    Pain  -You may experience soreness at the injection site for one or two days  -You may use an ice pack for 20 minutes every 2 hours for the first 24 hours  -You may use a heating pad after the first 24 hours  -You may  "use Tylenol (acetaminophen) every 4 hours or other pain medicines as     directed by your physician    You may experience numbness radiating into your legs or arms (depending on the procedure location). This numbness may last several hours. Until sensation returns to normal; please use caution in walking, climbing stairs, and stepping out of your vehicle, etc.    DID YOU RECEIVE SEDATION TODAY?  {YES / NO:[id number]::\"Yes\"}    Safety  Sedation medicine, if given, may remain active for many hours. It is important for the next 24 hours that you do not:  -Drive a car  -Operate machines or power tools  -Consume alcohol, including beer  -Sign any important papers or legal documents      Common side effects of steroids:  Not everyone will experience corticosteroid side effects. If side effects are experienced, they will gradually subside in the 7-10 day period following an injection. Most common side effects include:  -Flushed face and/or chest  -Feeling of warmth, particularly in the face but could be an overall feeling of warmth  -Increased blood sugar in diabetic patients  -Menstrual irregularities my occur. If taking hormone-based birth control an alternate method of birth control is recommended  -Sleep disturbances and/or mood swings are possible  -Leg cramps    Please contact us if you have:  -Severe pain  -Fever more than 101.5 degrees Fahrenheit  -Signs of infection at the injection site (redness, swelling, or drainage)    If you have questions, please contact our office at [phone] between the hours of 7:00 am and 3:00 pm Monday through Friday. After office hours you can contact the on call provider by dialing [phone]. If you need immediate attention, we recommend that you go to a hospital emergency room or dial [phone].      Pending Results     No orders found from 8/28/2017 to 8/31/2017.            Admission Information     Date & Time Provider Department Dept. Phone    8/30/2017 Pedrito Carr MD [facility] [phone]      Your Vitals Were     Blood Pressure Pulse Temperature Respirations Height Weight    99/61 71 98  F (36.7  C) (Oral) 16 1.727 m (5' 8\") 83.9 kg (185 lb)    Pulse Oximetry BMI (Body Mass Index)                93% 28.13 kg/m2          [employer] Information     [employer] is an electronic gateway that provides easy, online access to your medical records. With [employer], you can request a clinic appointment, read your test results, renew a prescription or communicate with your care team.     To sign up for [employer] visit the website at [email or web address]   You will be asked to enter the access code listed below, as well as some personal information. Please follow the directions to create your username and password.     Your access code is: [email or web address]  Expires: 2017 12:05 PM     Your access code will  in 90 days. If you need help or a new code, please contact your [facility] or call [phone] for assistance.        Care EveryWhere ID     This is your Care EveryWhere ID. This could be used by other organizations to access your [facility] medical records  [id number]        Equal Access to Services     ASH TREVINO AH: Sri morrello [employer], waaxda lugwenadaha, qaybta kaalmada adeegyada, patience montgomery. So [facility] [phone].    ATENCIÓN: Si habla español, tiene a grove disposición servicios gratuitos de asistencia lingüística. Llame al [phone].    We comply with applicable federal civil rights laws and [place] laws. We do not discriminate on the basis of race, color, national origin, age, disability sex, sexual orientation or gender identity.               Review of your medicines      UNREVIEWED medicines. Ask your doctor about these medicines        Dose / Directions    cyclobenzaprine 10 MG tablet   Commonly known as:  FLEXERIL        Dose:  10 mg   Take 10 mg by mouth   Refills:  0       " lisinopril-hydrochlorothiazide 10-12.5 MG per tablet   Commonly known as:  PRINZIDE/ZESTORETIC        Dose:  1 tablet   Take 1 tablet by mouth daily   Refills:  0       OMEPRAZOLE PO        Dose:  20 mg   Take 20 mg by mouth every morning   Refills:  0       oxyCODONE-acetaminophen 7.5-325 MG per tablet   Commonly known as:  PERCOCET        Dose:  1 tablet   Take 1 tablet by mouth   Refills:  0       PAXIL 40 MG tablet   Generic drug:  PARoxetine        Dose:  40 mg   Take 40 mg by mouth   Refills:  0                Protect others around you: Learn how to safely use, store and throw away your medicines at www.disposemymeds.org.             Medication List: This is a list of all your medications and when to take them. Check marks below indicate your daily home schedule. Keep this list as a reference.      Medications           Morning Afternoon Evening Bedtime As Needed    cyclobenzaprine 10 MG tablet   Commonly known as:  FLEXERIL   Take 10 mg by mouth                                lisinopril-hydrochlorothiazide 10-12.5 MG per tablet   Commonly known as:  PRINZIDE/ZESTORETIC   Take 1 tablet by mouth daily                                OMEPRAZOLE PO   Take 20 mg by mouth every morning                                oxyCODONE-acetaminophen 7.5-325 MG per tablet   Commonly known as:  PERCOCET   Take 1 tablet by mouth                                PAXIL 40 MG tablet   Take 40 mg by mouth   Generic drug:  PARoxetine

## 2017-09-13 ENCOUNTER — CARE COORDINATION (OUTPATIENT)
Dept: ANESTHESIOLOGY | Facility: CLINIC | Age: 52
End: 2017-09-13

## 2017-09-13 NOTE — PROGRESS NOTES
"Purpose of call: Follow up after L5 selective nerve root block performed 08/30/17  Spoke with: Yun    Location of pain: low back radiating down to right leg, sometimes down left leg  Percentage of pain relief after procedure: 40-45%     Pain is less constant; it was constant before \"the pain comes and goes.\"        Follow up: Will update HIEU Trevino working with Dr. Tim, who the referral came from.  "

## 2017-09-14 ENCOUNTER — CARE COORDINATION (OUTPATIENT)
Dept: ANESTHESIOLOGY | Facility: CLINIC | Age: 52
End: 2017-09-14

## 2017-10-04 LAB — MAMMOGRAM: NORMAL

## 2019-01-11 ENCOUNTER — TELEPHONE (OUTPATIENT)
Dept: ORTHOPEDICS | Facility: CLINIC | Age: 54
End: 2019-01-11

## 2019-01-11 NOTE — TELEPHONE ENCOUNTER
[facility] Call [facility]    Phone Message    May a detailed message be left on voicemail: yes    Reason for Call: Other: Pt was wondering if anything needed to be done prior to appt with Dr. Carrion, such as new imaging. Please follow up with Pt if new imaging is needed.      Action Taken: Message routed to:  [facility] ([facility] Ortho Clinic

## 2019-01-11 NOTE — TELEPHONE ENCOUNTER
[facility]    Phone Message    May a detailed message be left on voicemail: yes    Reason for Call: Other: Pt wants to know if she can get new imaging done before her appt. Pt said her injections are from 2017 and didn't do anything for her.      Action Taken: Message routed to:  [facility] Orthopedics

## 2019-01-24 ENCOUNTER — DOCUMENTATION ONLY (OUTPATIENT)
Dept: CARE COORDINATION | Facility: CLINIC | Age: 54
End: 2019-01-24

## 2019-02-04 DIAGNOSIS — M54.9 BACK PAIN: Primary | ICD-10-CM

## 2019-02-26 ENCOUNTER — ANCILLARY PROCEDURE (OUTPATIENT)
Dept: GENERAL RADIOLOGY | Facility: CLINIC | Age: 54
End: 2019-02-26
Attending: ORTHOPAEDIC SURGERY

## 2019-02-26 ENCOUNTER — OFFICE VISIT (OUTPATIENT)
Dept: ORTHOPEDICS | Facility: CLINIC | Age: 54
End: 2019-02-26

## 2019-02-26 VITALS — HEIGHT: 68 IN | WEIGHT: 180 LBS | BODY MASS INDEX: 27.28 KG/M2

## 2019-02-26 DIAGNOSIS — M47.816 LUMBAR SPONDYLOSIS: ICD-10-CM

## 2019-02-26 DIAGNOSIS — M54.9 BACK PAIN: ICD-10-CM

## 2019-02-26 DIAGNOSIS — M40.209 ACQUIRED KYPHOSIS: Primary | ICD-10-CM

## 2019-02-26 RX ORDER — MORPHINE SULFATE 15 MG/1
TABLET, FILM COATED, EXTENDED RELEASE ORAL
COMMUNITY
Start: 2018-12-13 | End: 2019-07-10

## 2019-02-26 RX ORDER — OXYCODONE HYDROCHLORIDE 5 MG/1
TABLET ORAL
COMMUNITY
Start: 2019-02-11 | End: 2019-07-10

## 2019-02-26 RX ORDER — HYDROXYZINE HYDROCHLORIDE 10 MG/1
TABLET, FILM COATED ORAL
Refills: 0 | COMMUNITY
Start: 2018-10-07 | End: 2019-07-10

## 2019-02-26 ASSESSMENT — ENCOUNTER SYMPTOMS
PALPITATIONS: 0
RECTAL PAIN: 0
BACK PAIN: 1
HEMATURIA: 0
LOSS OF CONSCIOUSNESS: 0
CHILLS: 1
CONSTIPATION: 1
DECREASED APPETITE: 0
NIGHT SWEATS: 1
NERVOUS/ANXIOUS: 1
SYNCOPE: 0
WEAKNESS: 1
DIFFICULTY URINATING: 0
SPEECH CHANGE: 0
DECREASED CONCENTRATION: 0
LEG PAIN: 1
WEIGHT LOSS: 0
DIZZINESS: 1
STIFFNESS: 0
FLANK PAIN: 0
TINGLING: 1
FATIGUE: 1
POLYPHAGIA: 0
BOWEL INCONTINENCE: 0
MUSCLE WEAKNESS: 0
NAUSEA: 0
ALTERED TEMPERATURE REGULATION: 1
HYPERTENSION: 1
ABDOMINAL PAIN: 0
HEARTBURN: 0
ORTHOPNEA: 0
PANIC: 1
INCREASED ENERGY: 0
TREMORS: 0
HYPOTENSION: 0
DYSURIA: 0
EXERCISE INTOLERANCE: 1
SEIZURES: 0
MYALGIAS: 1
LIGHT-HEADEDNESS: 1
FEVER: 0
NECK PAIN: 1
MEMORY LOSS: 0
DEPRESSION: 1
HEADACHES: 0
DISTURBANCES IN COORDINATION: 1
PARALYSIS: 0
DIARRHEA: 0
INSOMNIA: 1
WEIGHT GAIN: 1
NUMBNESS: 1
JAUNDICE: 0
BLOATING: 1
VOMITING: 0
JOINT SWELLING: 0
MUSCLE CRAMPS: 1
SLEEP DISTURBANCES DUE TO BREATHING: 0
ARTHRALGIAS: 1
BLOOD IN STOOL: 0
HALLUCINATIONS: 0
POLYDIPSIA: 0

## 2019-02-26 ASSESSMENT — MIFFLIN-ST. JEOR: SCORE: 1459.97

## 2019-02-26 NOTE — LETTER
2/26/2019       RE: Yun Sagastume  [address] [place] [address]     Dear Colleague,    Thank you for referring your patient, Yun Sagastume, to the [facility]. Please see a copy of my visit note below.    Spine Surgical Hx:    2011 - Laminectomy L4-5,L5-S1 (Dr. Kumar Santos, [facility]).      REASON FOR CONSULTATION: RECHECK (F/U lower back. States she is having upper back pain now. )     REFERRING PHYSICIAN: Referred Self   PCP:Mary Ellen Salgado    History of Present Illness:  54/f, previously followed by Dr. Tim; last seen 8/3/17.  Seeing me for 1st time today because dr. Tim is on medical leave.    2011 decompression surgery L4-S1.  Helped some at the time, but over time sxs got progressively worse again.    Back 50%, Leg 50%,  Right > Left.  Low back pain radiating to R leg, occasionally to left side.  Right buttock, anterior thigh, knee, skips the calf, then with tingling/burning of bottom of foot/toes.  Also having some upper back (between shoulder blades) pain.  Worse: going up and down stairs; any movement/activity; carrying/lifting.  Sitting ~ standing.  Cannot maintain 1 position for long time (15-20 mins).    Better: Switching positions.  Pain meds, ice packs.  (-) shopping cart.     Previous treatment:   Oxycodone 5 mg tabs, 4 tabs/day (20 mg/day).  Morphine SR 15 mg tabs, 1 tab bid (30 mg/day).  Been taking opioids x at least ~1.5 yrs.  Started with lower dose.  Prescribed by [facility] (Dr. Jain?)  At time of last visit with Dr. Tim (08/2017), was documented to be taking percocet 10/325 mg, 4 tabs/day (40 mg/day).  Cyclobenzaprine    Per patient, had previous discogram, and was very painful; would not want to go thru it again.  Had previous spinal tap, as part of MS workup; results negative.  But had complication, persistent CSF leak, needing blood patch.    Currently not working x 6 months.  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  However, she was being asked to do more physical type job, and so she had to give up the job.  Started procedure to apply for Social Security disability.    DM2 fairly well controlled; HgbA1c < 7.  Quit smoking ~15 yrs ago.  No previous fracture nor BMD issues.  (+) Raynaud's phenomenon (hands and feet).    Previous abd'l surgeries:  Gastric bypass, laparoscopic (Buster-en-Y)  Cholecystectomy, laparoscopic  Vaginal Hysterectomy      Oswestry (GABRIEL) Questionnaire    OSWESTRY DISABILITY INDEX 2/26/2019   Count 10   Sum 29   Oswestry Score (%) 58        Visual Analog Pain Scale  Back Pain Scale 0-10: 7  Right leg pain: 6  Left leg pain: 0    PROMIS-10 Scores  Global Mental Health Score: (P) 8  Global Physical Health Score: (P) 8  PROMIS TOTAL - SUBSCORES: (P) 16    ROS:  A 12-point review of systems was completed and is negative except for otherwise noted above in the history of present illness.    Med Hx:  No past medical history on file.    Surg Hx:  Past Surgical History:   Procedure Laterality Date     INJECT EPIDURAL TRANSFORAMINAL LUMBAR / SACRAL SINGLE Bilateral 8/17/2017    Procedure: INJECT EPIDURAL TRANSFORAMINAL LUMBAR / SACRAL SINGLE;  Bilateral lumbar 4 selective nerve root blocks;  Surgeon: Ney Toure MD;  [facility]: [facility]     INJECT EPIDURAL TRANSFORAMINAL LUMBAR / SACRAL SINGLE Bilateral 8/30/2017    Procedure: INJECT EPIDURAL TRANSFORAMINAL LUMBAR / SACRAL SINGLE;  Lumbar 5 Nerve Root Block, Bilateral;  Surgeon: Pedrito Carr MD;  [facility]: [facility]       Allergies:  Allergies   Allergen Reactions     Aspirin      Codeine Nausea     20 years ago     Nsaids      Amoxicillin Rash       Meds:  Current Outpatient Medications   Medication     cyclobenzaprine (FLEXERIL) 10 MG tablet     hydrOXYzine (ATARAX) 10 MG tablet     lisinopril-hydrochlorothiazide (PRINZIDE/ZESTORETIC)  "10-12.5 MG per tablet     morphine (MS CONTIN) 15 MG CR tablet     OMEPRAZOLE PO     oxyCODONE (ROXICODONE) 5 MG tablet     PARoxetine (PAXIL) 40 MG tablet     oxyCODONE-acetaminophen (PERCOCET) 7.5-325 MG per tablet     No current facility-administered medications for this visit.        Fam Hx:  No family history on file.    P/S Hx:  Social History     Tobacco Use     Smoking status: Never Smoker     Smokeless tobacco: Never Used   Substance Use Topics     Alcohol use: Not on file         Physical Exam:  Very pleasant, healthy appearing, alert, oriented x 3, cooperative.  Normal mood and affect.  Not in cardiorespiratory distress.  Ht 1.727 m (5' 8\")   Wt 81.6 kg (180 lb)   BMI 27.37 kg/m     Stand upright.  Normal gait without assistive device.  No antalgia / imbalance.  Abdomen: (+) laparoscopic small perc scars; no big scars.  Back: (+) flatback.  Well healed midline posterior scar L4-S1.  No tenderness.  Localizes pain at along beltline.  Tenderness: (-) midline, (-) paraspinal, (-) R and L PSIS.    Neuro Exam:  Motor: 5/5 strength for all muscle groups in both LE's.  Sensory:  Intact to light touch in both LE's.       Imaging:  EOS full spine ap-lat standing x-rays show advanced lower lumbar spondylosis with disc collapse and loss of lordosis.  Also with thoracic hyperkyphosis.  Compensatory significant pelvic retroversion and upper lumbar hyperlordosis.  Still with 15 degree PI-LL mismatch, and +7.6cm SVA.  Sagittal measurements as follows:  LL 40  L4-S1 22, ideal 36  PI 55  PI-LL mismatch 15  PT 31  SVA +7.6cm  TPA 31  GT 39  T10-L2 kyph 12  TK T1-T12 58    Impression:   - Spinal sagittal malalignment with lower lumbar hypolordosis (L4-S1 = 22, ideal 36), PI-LL mismatch = 15, pelvic retroversion (PT = 31), and SVA = +7.6cm.  - Advanced lower lumbar spondylosis L3-4,L4-5,L5-S1.  - S/p decompression L4-5,L5-S1 (2011, Transfeldt).  - Chronic opioid dependence (currently oxycodone 20 mg/day; morphine SR 30 " mg/day)     Plan:   Had good long discussion with patient.    - Lumbar and thoracic MRI  - Lumbar CT    Advised to try to wean down pain med intake, and work with her Pain provider on this.  Explained the rationale, and anticipated significant difficulty with postop pain control 2' to opioid tolerance.  If will eventually have surgeyr, will ask [facility] to provide pain mgmt recommendation periop.    RTC to review and discuss; will schedule tests and visit on same day (lives 1.5 hrs away).    Pending above results, may consider L3-pelvis fusion (ALIF-PISF).    All questions and concerns were answered to the patient's apparent satisfaction before leaving the clinic.     Total visit time > 40 mins, > 50% counseling and coordination of care.      Again, thank you for allowing me to participate in the care of your patient.      Sincerely,    Jeremiah Carrion MD

## 2019-02-26 NOTE — PROGRESS NOTES
Spine Surgical Hx:    2011 - Laminectomy L4-5,L5-S1 (Dr. Kumar Santos, [facility], [place]).      REASON FOR CONSULTATION: RECHECK (F/U lower back. States she is having upper back pain now. )     REFERRING PHYSICIAN: Referred Self   PCP:Mary Ellen Salgado    History of Present Illness:  54/f, previously followed by Dr. Tim; last seen 8/3/17.  Seeing me for 1st time today because dr. Tim is on medical leave.    2011 decompression surgery L4-S1.  Helped some at the time, but over time sxs got progressively worse again.    Back 50%, Leg 50%,  Right > Left.  Low back pain radiating to R leg, occasionally to left side.  Right buttock, anterior thigh, knee, skips the calf, then with tingling/burning of bottom of foot/toes.  Also having some upper back (between shoulder blades) pain.  Worse: going up and down stairs; any movement/activity; carrying/lifting.  Sitting ~ standing.  Cannot maintain 1 position for long time (15-20 mins).    Better: Switching positions.  Pain meds, ice packs.  (-) shopping cart.     Previous treatment:   Oxycodone 5 mg tabs, 4 tabs/day (20 mg/day).  Morphine SR 15 mg tabs, 1 tab bid (30 mg/day).  Been taking opioids x at least ~1.5 yrs.  Started with lower dose.  Prescribed by [facility] (Dr. Jain?)  At time of last visit with Dr. Tim (08/2017), was documented to be taking percocet 10/325 mg, 4 tabs/day (40 mg/day).  Cyclobenzaprine    Per patient, had previous discogram, and was very painful; would not want to go thru it again.  Had previous spinal tap, as part of MS workup; results negative.  But had complication, persistent CSF leak, needing blood patch.    Currently not working x 6 months.  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  However, she was being asked to do more physical type job, and so she had to give up the job.  Started procedure to apply for Social Security disability.    DM2 fairly  well controlled; HgbA1c < 7.  Quit smoking ~15 yrs ago.  No previous fracture nor BMD issues.  (+) Raynaud's phenomenon (hands and feet).    Previous abd'l surgeries:  Gastric bypass, laparoscopic (Buster-en-Y)  Cholecystectomy, laparoscopic  Vaginal Hysterectomy      Oswestry (GABRIEL) Questionnaire    OSWESTRY DISABILITY INDEX 2/26/2019   Count 10   Sum 29   Oswestry Score (%) 58        Visual Analog Pain Scale  Back Pain Scale 0-10: 7  Right leg pain: 6  Left leg pain: 0    PROMIS-10 Scores  Global Mental Health Score: (P) 8  Global Physical Health Score: (P) 8  PROMIS TOTAL - SUBSCORES: (P) 16    ROS:  A 12-point review of systems was completed and is negative except for otherwise noted above in the history of present illness.    Med Hx:  No past medical history on file.    Surg Hx:  Past Surgical History:   Procedure Laterality Date     INJECT EPIDURAL TRANSFORAMINAL LUMBAR / SACRAL SINGLE Bilateral 8/17/2017    Procedure: INJECT EPIDURAL TRANSFORAMINAL LUMBAR / SACRAL SINGLE;  Bilateral lumbar 4 selective nerve root blocks;  Surgeon: Ney Toure MD;  [facility]     INJECT EPIDURAL TRANSFORAMINAL LUMBAR / SACRAL SINGLE Bilateral 8/30/2017    Procedure: INJECT EPIDURAL TRANSFORAMINAL LUMBAR / SACRAL SINGLE;  Lumbar 5 Nerve Root Block, Bilateral;  Surgeon: Pedrito Carr MD;  [facility]       Allergies:  Allergies   Allergen Reactions     Aspirin      Codeine Nausea     20 years ago     Nsaids      Amoxicillin Rash       Meds:  Current Outpatient Medications   Medication     cyclobenzaprine (FLEXERIL) 10 MG tablet     hydrOXYzine (ATARAX) 10 MG tablet     lisinopril-hydrochlorothiazide (PRINZIDE/ZESTORETIC) 10-12.5 MG per tablet     morphine (MS CONTIN) 15 MG CR tablet     OMEPRAZOLE PO     oxyCODONE (ROXICODONE) 5 MG tablet     PARoxetine (PAXIL) 40 MG tablet     oxyCODONE-acetaminophen (PERCOCET) 7.5-325 MG per tablet     No current facility-administered medications for this visit.  "       Fam Hx:  No family history on file.    P/S Hx:  Social History     Tobacco Use     Smoking status: Never Smoker     Smokeless tobacco: Never Used   Substance Use Topics     Alcohol use: Not on file         Physical Exam:  Very pleasant, healthy appearing, alert, oriented x 3, cooperative.  Normal mood and affect.  Not in cardiorespiratory distress.  Ht 1.727 m (5' 8\")   Wt 81.6 kg (180 lb)   BMI 27.37 kg/m    Stand upright.  Normal gait without assistive device.  No antalgia / imbalance.  Abdomen: (+) laparoscopic small perc scars; no big scars.  Back: (+) flatback.  Well healed midline posterior scar L4-S1.  No tenderness.  Localizes pain at along beltline.  Tenderness: (-) midline, (-) paraspinal, (-) R and L PSIS.    Neuro Exam:  Motor: 5/5 strength for all muscle groups in both LE's.  Sensory:  Intact to light touch in both LE's.       Imaging:  EOS full spine ap-lat standing x-rays show advanced lower lumbar spondylosis with disc collapse and loss of lordosis.  Also with thoracic hyperkyphosis.  Compensatory significant pelvic retroversion and upper lumbar hyperlordosis.  Still with 15 degree PI-LL mismatch, and +7.6cm SVA.  Sagittal measurements as follows:  LL 40  L4-S1 22, ideal 36  PI 55  PI-LL mismatch 15  PT 31  SVA +7.6cm  TPA 31  GT 39  T10-L2 kyph 12  TK T1-T12 58    Impression:   - Spinal sagittal malalignment with lower lumbar hypolordosis (L4-S1 = 22, ideal 36), PI-LL mismatch = 15, pelvic retroversion (PT = 31), and SVA = +7.6cm.  - Advanced lower lumbar spondylosis L3-4,L4-5,L5-S1.  - S/p decompression L4-5,L5-S1 (2011, Transfeldt).  - Chronic opioid dependence (currently oxycodone 20 mg/day; morphine SR 30 mg/day)     Plan:   Had good long discussion with patient.    - Lumbar and thoracic MRI  - Lumbar CT    Advised to try to wean down pain med intake, and work with her Pain provider on this.  Explained the rationale, and anticipated significant difficulty with postop pain control 2' to " opioid tolerance.  If will eventually have surgeyr, will ask [facility] to provide pain mgmt recommendation periop.    RTC to review and discuss; will schedule tests and visit on same day (lives 1.5 hrs away).    Pending above results, may consider L3-pelvis fusion (ALIF-PISF).    All questions and concerns were answered to the patient's apparent satisfaction before leaving the clinic.     Total visit time > 40 mins, > 50% counseling and coordination of care.    Respectfully,    Jeremiah Carrion MD    Orthopaedic Spine Surgery  Dept Orthopaedic Surgery, [facility]  [phone] office, [phone] pager  [email or web address]

## 2019-02-26 NOTE — NURSING NOTE
"Reason For Visit:   Chief Complaint   Patient presents with     RECHECK     F/U lower back. States she is having upper back pain now.      Primary MD: Mary Ellen Salgado  Ref. MD: Dr. Browning        Occupation [employer].  Currently working? Yes.  Work status?  Full time.  Date of injury: over the years  Type of injury: several injuries.  Date of surgery: 2011  Type of surgery: low back surgery with Dr. Santos.  Smoker: No      Ht 1.727 m (5' 8\")   Wt 81.6 kg (180 lb)   BMI 27.37 kg/m      Pain Assessment  Patient Currently in Pain: Yes  0-10 Pain Scale: 7  Primary Pain Location: Back  Pain Descriptors: Discomfort    Oswestry (GABRIEL) Questionnaire    OSWESTRY DISABILITY INDEX 2/26/2019   Count 10   Sum 29   Oswestry Score (%) 58        Visual Analog Pain Scale  Back Pain Scale 0-10: 7  Right leg pain: 6  Left leg pain: 0    Promis 10 Assessment    PROMIS 10 2/26/2019   In general, would you say your health is: Good   In general, would you say your quality of life is: Fair   In general, how would you rate your physical health? Poor   In general, how would you rate your mental health, including your mood and your ability to think? Fair   In general, how would you rate your satisfaction with your social activities and relationships? Fair   In general, please rate how well you carry out your usual social activities and roles Fair   To what extent are you able to carry out your everyday physical activities such as walking, climbing stairs, carrying groceries, or moving a chair? A little   How often have you been bothered by emotional problems such as feeling anxious, depressed or irritable? Often   How would you rate your fatigue on average? Moderate   How would you rate your pain on average?   0 = No Pain  to  10 = Worst Imaginable Pain 8   Global Physical Health Score : Raw Score -   Global Mental Health Score : Raw Score -   Total (Physical + Mental Health Score) -   In general, would you say your health is: 3 "   In general, would you say your quality of life is: 2   In general, how would you rate your physical health? 1   In general, how would you rate your mental health, including your mood and your ability to think? 2   In general, how would you rate your satisfaction with your social activities and relationships? 2   In general, please rate how well you carry out your usual social activities and roles. (This includes activities at home, at work and in your community, and responsibilities as a parent, child, spouse, employee, friend, etc.) 2   To what extent are you able to carry out your everyday physical activities such as walking, climbing stairs, carrying groceries, or moving a chair? 2   In the past 7 days, how often have you been bothered by emotional problems such as feeling anxious, depressed, or irritable? 4   In the past 7 days, how would you rate your fatigue on average? 3   In the past 7 days, how would you rate your pain on average, where 0 means no pain, and 10 means worst imaginable pain? 8   Global Mental Health Score 8   Global Physical Health Score 8   PROMIS TOTAL - SUBSCORES 16   Some recent data might be hidden                Nkechi Delgado LPN

## 2019-02-28 ENCOUNTER — ANCILLARY PROCEDURE (OUTPATIENT)
Dept: MRI IMAGING | Facility: CLINIC | Age: 54
End: 2019-02-28
Attending: ORTHOPAEDIC SURGERY

## 2019-02-28 ENCOUNTER — ANCILLARY PROCEDURE (OUTPATIENT)
Dept: CT IMAGING | Facility: CLINIC | Age: 54
End: 2019-02-28
Attending: ORTHOPAEDIC SURGERY

## 2019-03-04 NOTE — PROGRESS NOTES
Lumbar MRI on 2/28/2019 reviewed: showed multilevel spondylosis or degenerative changes with disc collapse at L4-5 and L5-S1.  There are post decompression changes at L4-5 and L5-S1.  There is residual spinal stenosis right greater than left L3-4 and L4-5, and left greater than right L5-S1.    Thoracic MRI on 2/28/2019 show mild disc protrusion at T6-7, without compression of the spinal cord.  No spinal cord signal change.  No other abnormal findings in the thoracic spine.    Lumbar CT on 2/28/2019 shows advanced multilevel spondylosis with advanced disc collapse and loss of lordosis at L4-5 and L5-S1.  There is some asymmetry to the disc collapse and stenosis: right > left at L4-5 and left > right at L5-S1.    Will review and discuss these further with the patient on her next clinic visit.

## 2019-03-09 ENCOUNTER — HEALTH MAINTENANCE LETTER (OUTPATIENT)
Age: 54
End: 2019-03-09

## 2019-03-14 ENCOUNTER — OFFICE VISIT (OUTPATIENT)
Dept: ORTHOPEDICS | Facility: CLINIC | Age: 54
End: 2019-03-14

## 2019-03-14 VITALS — BODY MASS INDEX: 29.53 KG/M2 | WEIGHT: 194.2 LBS

## 2019-03-14 DIAGNOSIS — M54.16 LUMBAR RADICULAR PAIN: ICD-10-CM

## 2019-03-14 DIAGNOSIS — M85.88 OSTEOPENIA OF LUMBAR SPINE: ICD-10-CM

## 2019-03-14 DIAGNOSIS — M40.209 ACQUIRED KYPHOSIS: Primary | ICD-10-CM

## 2019-03-14 DIAGNOSIS — M47.816 LUMBAR SPONDYLOSIS: ICD-10-CM

## 2019-03-14 ASSESSMENT — ENCOUNTER SYMPTOMS
JOINT SWELLING: 0
PARALYSIS: 0
ALTERED TEMPERATURE REGULATION: 0
POLYDIPSIA: 0
WEAKNESS: 0
WEIGHT GAIN: 1
INCREASED ENERGY: 0
STIFFNESS: 0
NERVOUS/ANXIOUS: 1
INSOMNIA: 1
TINGLING: 1
MUSCLE WEAKNESS: 0
WEIGHT LOSS: 0
HALLUCINATIONS: 0
MEMORY LOSS: 0
SEIZURES: 0
MUSCLE CRAMPS: 1
ARTHRALGIAS: 1
DIZZINESS: 1
NIGHT SWEATS: 1
SPEECH CHANGE: 0
FATIGUE: 1
DEPRESSION: 1
MYALGIAS: 1
NECK PAIN: 1
DISTURBANCES IN COORDINATION: 0
TREMORS: 0
NUMBNESS: 1
POLYPHAGIA: 0
DECREASED CONCENTRATION: 0
FEVER: 0
PANIC: 0
CHILLS: 0
BACK PAIN: 1
LOSS OF CONSCIOUSNESS: 0
HEADACHES: 0
DECREASED APPETITE: 0

## 2019-03-14 NOTE — NURSING NOTE
Reason For Visit:   Chief Complaint   Patient presents with     Spine - Follow Up     Middle Back - Follow Up     RECHECK     Follow up CT, MRI Spine and Thoracic,      Back Pain     Low back pain, upper lumbar and shoulder pain        Primary MD: Mary Ellen Salgado    ?  No  Occupation N/A.  Currently working? No.  Work status?  On disability.  Date of surgery: 2011  Type of surgery: Decompression on lower back.  Smoker: No  Request smoking cessation information: No    Wt 88.1 kg (194 lb 3.2 oz)   BMI 29.53 kg/m      Pain Assessment  Patient Currently in Pain: Yes  0-10 Pain Scale: 8  Primary Pain Location: Back  Other Pain Locations: Lower back   Pain Descriptors: Radiating, Discomfort, Penetrating, Stabbing    Oswestry (GABRIEL) Questionnaire    OSWESTRY DISABILITY INDEX 3/14/2019   Count 10   Sum 29   Oswestry Score (%) 58                  Visual Analog Pain Scale  Back Pain Scale 0-10: 8  Right leg pain: 8  Left leg pain: 3    Promis 10 Assessment    PROMIS 10 3/14/2019   In general, would you say your health is: Good   In general, would you say your quality of life is: Fair   In general, how would you rate your physical health? Fair   In general, how would you rate your mental health, including your mood and your ability to think? Fair   In general, how would you rate your satisfaction with your social activities and relationships? Fair   In general, please rate how well you carry out your usual social activities and roles Fair   To what extent are you able to carry out your everyday physical activities such as walking, climbing stairs, carrying groceries, or moving a chair? A little   How often have you been bothered by emotional problems such as feeling anxious, depressed or irritable? Often   How would you rate your fatigue on average? Moderate   How would you rate your pain on average?   0 = No Pain  to  10 = Worst Imaginable Pain 9   Global Physical Health Score : Raw Score -   Global Mental Health  Score : Raw Score -   Total (Physical + Mental Health Score) -   In general, would you say your health is: 3   In general, would you say your quality of life is: 2   In general, how would you rate your physical health? 2   In general, how would you rate your mental health, including your mood and your ability to think? 2   In general, how would you rate your satisfaction with your social activities and relationships? 2   In general, please rate how well you carry out your usual social activities and roles. (This includes activities at home, at work and in your community, and responsibilities as a parent, child, spouse, employee, friend, etc.) 2   To what extent are you able to carry out your everyday physical activities such as walking, climbing stairs, carrying groceries, or moving a chair? 2   In the past 7 days, how often have you been bothered by emotional problems such as feeling anxious, depressed, or irritable? 4   In the past 7 days, how would you rate your fatigue on average? 3   In the past 7 days, how would you rate your pain on average, where 0 means no pain, and 10 means worst imaginable pain? 9   Global Mental Health Score 8   Global Physical Health Score 9   PROMIS TOTAL - SUBSCORES 17   Some recent data might be hidden                Osmani Carr CMA

## 2019-03-14 NOTE — LETTER
3/14/2019       RE: Yun Sagastume  [address] [place] [address]     Dear Colleague,    Thank you for referring your patient, Yun Sagastume, to the [facility]. Please see a copy of my visit note below.    Spine Surgical Hx:     2011 - Laminectomy L4-5,L5-S1 (Dr. Kumar Santos, [facility]).  Per patient, did not really help.    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  - Advanced lower lumbar spondylosis L3-4,L4-5,L5-S1.  - S/p decompression L4-5,L5-S1 (2011, Transfeldt).  - Chronic opioid dependence (currently oxycodone 20 mg/day; morphine SR 30 mg/day)     Previous Plan:  - Lumbar and thoracic MRI  - Lumbar CT     Advised to try to wean down pain med intake, and work with her Pain provider on this.  Explained the rationale, and anticipated significant difficulty with postop pain control 2' to opioid tolerance.  If will eventually have surgeyr, will ask [facility] to provide pain mgmt recommendation periop.     RTC to review and discuss; will schedule tests and visit on same day (lives 1.5 hrs away).     Pending above results, may consider L3-pelvis fusion (ALIF-PISF).    S>  54/f, here for f/u.    No change to sxs from last visit.  STill 50% back, 50% legs R>L.    No change to Previous visit meds:  Oxycodone 5 mg tabs, 4 tabs/day (20 mg/day).  Morphine SR 15 mg tabs, 1 tab bid (30 mg/day).  Been taking opioids x at least ~1.5 yrs.  Started with lower dose.  Prescribed by [facility] (Dr. Jain?)  At time of last visit with Dr. Tim (08/2017), was documented to be taking percocet 10/325 mg, 4 tabs/day (40 mg/day).  Cyclobenzaprine     Lives with eldest son and daughter.  Quit smoking 15 yrs ago.  Applying for social security  disability; currently not working.    Previous abd'l surgeries:  Gastric bypass, laparoscopic (Buster-en-Y)  Cholecystectomy, laparoscopic  Vaginal Hysterectomy  Two C-sections via Pfannenstiel (bikini) incisions.    Oswestry (GABRIEL) Questionnaire    OSWESTRY DISABILITY INDEX 3/14/2019   Count 10   Sum 29   Oswestry Score (%) 58        Visual Analog Pain Scale  Back Pain Scale 0-10: 8  Right leg pain: 8  Left leg pain: 3    PROMIS-10 Scores  Global Mental Health Score: (P) 8  Global Physical Health Score: (P) 9  PROMIS TOTAL - SUBSCORES: (P) 17    O>   Alert, oriented x 3, cooperative.  Not in CP distress.  Wt 88.1 kg (194 lb 3.2 oz)   BMI 29.53 kg/m     Ambulates independently.   Grossly neurologically intact.  Detailed exam not performed today; please see previous note.    Imaging:   Lumbar MRI on 2/28/2019 reviewed: showed multilevel spondylosis or degenerative changes with disc collapse at L4-5 and L5-S1.  There are post decompression changes at L4-5 and L5-S1.  There is residual spinal stenosis right greater than left L3-4 and L4-5, and left greater than right L5-S1.     Thoracic MRI on 2/28/2019 show mild disc protrusion at T6-7, without compression of the spinal cord.  No spinal cord signal change.  No other abnormal findings in the thoracic spine.     Lumbar CT on 2/28/2019 shows advanced multilevel spondylosis with advanced disc collapse and loss of lordosis at L4-5 and L5-S1.  There is some asymmetry to the disc collapse and stenosis: right > left at L4-5 and left > right at L5-S1.  Opportunistic BMD measurement at L3 level: 115 HU (sagittal); 79 HU (axial).    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  - Advanced lower lumbar spondylosis L3-4,L4-5,L5-S1.  - S/p decompression L4-5,L5-S1 (2011, Transfeldt).  - Chronic opioid dependence (currently oxycodone 20 mg/day; morphine SR 30 mg/day)   -  Probable osteopenia/porosis on opportunistic BMD screen, without prior hx of osteoporosis nor fractures.    P>   Had good long discussion with patient.    - DEXA (spine, hip ,wrist)  - Vit D deficiency screen    If low, will consider referral for bone health mgmt.  - Discussed surgery.  - Surg request placed:  Part 1: ALIF L4-5,L5-S1.  Part 2: PISF L3-pelvis; TLIF L3-4; SPO L4-5,L5-S1; poss cement augmentation of screws.  - PAC referral.  - Need postop Pair recs from [facility].    TT > 40 mins, > 50% CC.    Jeremiah Carrion MD    Orthopaedic Spine Surgery  Dept Orthopaedic Surgery, [facility]  [phone] office, [phone] pager  [email or web address]

## 2019-03-14 NOTE — PROGRESS NOTES
Spine Surgical Hx:     2011 - Laminectomy L4-5,L5-S1 (Dr. Kumar Santos, [facility]).  Per patient, did not really help.        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  - Advanced lower lumbar spondylosis L3-4,L4-5,L5-S1.  - S/p decompression L4-5,L5-S1 (2011, Transfeldt).  - Chronic opioid dependence (currently oxycodone 20 mg/day; morphine SR 30 mg/day)     Previous Plan:  - Lumbar and thoracic MRI  - Lumbar CT     Advised to try to wean down pain med intake, and work with her Pain provider on this.  Explained the rationale, and anticipated significant difficulty with postop pain control 2' to opioid tolerance.  If will eventually have surgeyr, will ask [facility] to provide pain mgmt recommendation periop.     RTC to review and discuss; will schedule tests and visit on same day (lives 1.5 hrs away).     Pending above results, may consider L3-pelvis fusion (ALIF-PISF).      S>  54/f, here for f/u.    No change to sxs from last visit.  STill 50% back, 50% legs R>L.    No change to Previous visit meds:  Oxycodone 5 mg tabs, 4 tabs/day (20 mg/day).  Morphine SR 15 mg tabs, 1 tab bid (30 mg/day).  Been taking opioids x at least ~1.5 yrs.  Started with lower dose.  Prescribed by [facility] (Dr. Jain?)  At time of last visit with Dr. Tim (08/2017), was documented to be taking percocet 10/325 mg, 4 tabs/day (40 mg/day).  Cyclobenzaprine     Lives with eldest son and daughter.  Quit smoking 15 yrs ago.  Applying for social security disability; currently not working.    Previous abd'l surgeries:  Gastric bypass, laparoscopic (Buster-en-Y)  Cholecystectomy, laparoscopic  Vaginal Hysterectomy  Two C-sections via Pfannenstiel (bikini) incisions.        Oswestry (GABRIEL) Questionnaire    OSWESTRY DISABILITY INDEX  3/14/2019   Count 10   Sum 29   Oswestry Score (%) 58        Visual Analog Pain Scale  Back Pain Scale 0-10: 8  Right leg pain: 8  Left leg pain: 3    PROMIS-10 Scores  Global Mental Health Score: (P) 8  Global Physical Health Score: (P) 9  PROMIS TOTAL - SUBSCORES: (P) 17    O>   Alert, oriented x 3, cooperative.  Not in CP distress.  Wt 88.1 kg (194 lb 3.2 oz)   BMI 29.53 kg/m    Ambulates independently.   Grossly neurologically intact.  Detailed exam not performed today; please see previous note.    Imaging:   Lumbar MRI on 2/28/2019 reviewed: showed multilevel spondylosis or degenerative changes with disc collapse at L4-5 and L5-S1.  There are post decompression changes at L4-5 and L5-S1.  There is residual spinal stenosis right greater than left L3-4 and L4-5, and left greater than right L5-S1.     Thoracic MRI on 2/28/2019 show mild disc protrusion at T6-7, without compression of the spinal cord.  No spinal cord signal change.  No other abnormal findings in the thoracic spine.     Lumbar CT on 2/28/2019 shows advanced multilevel spondylosis with advanced disc collapse and loss of lordosis at L4-5 and L5-S1.  There is some asymmetry to the disc collapse and stenosis: right > left at L4-5 and left > right at L5-S1.  Opportunistic BMD measurement at L3 level: 115 HU (sagittal); 79 HU (axial).    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  - Advanced lower lumbar spondylosis L3-4,L4-5,L5-S1.  - S/p decompression L4-5,L5-S1 (2011, Transfeldt).  - Chronic opioid dependence (currently oxycodone 20 mg/day; morphine SR 30 mg/day)   - Probable osteopenia/porosis on opportunistic BMD screen, without prior hx of osteoporosis nor fractures.    P>   Had good long discussion with patient.    - DEXA (spine, hip ,wrist)  - Vit D deficiency screen    If low, will consider referral for bone health mgmt.  -  Discussed surgery.  - Surg request placed:  Part 1: ALIF L4-5,L5-S1.  Part 2: PISF L3-pelvis; TLIF L3-4; SPO L4-5,L5-S1; poss cement augmentation of screws.  - PAC referral.  - Need postop Pair recs from [facility].    TT > 40 mins, > 50% CC.        Jeremiah Carrion MD    Orthopaedic Spine Surgery  Dept Orthopaedic Surgery, [facility]  [phone] office, [phone] pager  [email or web address]

## 2019-03-14 NOTE — NURSING NOTE
Teaching Flowsheet   Relevant Diagnosis: spine  Teaching Topic: preop Fusion     Person(s) involved in teaching:   Patient     Motivation Level:  Asks Questions: Yes  Eager to Learn: Yes  Cooperative: Yes  Receptive (willing/able to accept information): Yes  Any cultural factors/Lutheran beliefs that may influence understanding or compliance? No       Patient demonstrates understanding of the following:  Reason for the appointment, diagnosis and treatment plan: Yes  Knowledge of proper use of medications and conditions for which they are ordered (with special attention to potential side effects or drug interactions): Yes  Which situations necessitate calling provider and whom to contact: Yes       Teaching Concerns Addressed:        Proper use and care of meds. (medical equip, care aids, etc.): Yes  Nutritional needs and diet plan: Yes  Pain management techniques: Yes  Wound Care: Yes  How and/when to access community resources: Yes     Instructional Materials Used/Given: preop pkt     Time spent with patient: 15 minutes.

## 2019-03-15 LAB — DEPRECATED CALCIDIOL+CALCIFEROL SERPL-MC: 9 UG/L (ref 20–75)

## 2019-03-25 ENCOUNTER — TRANSFERRED RECORDS (OUTPATIENT)
Dept: HEALTH INFORMATION MANAGEMENT | Facility: CLINIC | Age: 54
End: 2019-03-25

## 2019-03-25 NOTE — PROGRESS NOTES
Addendum 3/25/19:    3/14/19 Vit D = 9 ug/L (ref range 20-75).    DEXA results still pending.  Will start Vit D 50,000 units/wk, and place Dr. Amado referral for bone health mgmt.      Addendum 4/9/19:  4/2/19 DEXA (spine, hip, wrist).  Lowest T-score = -1.1 (Right femoral neck, total).  Still awaiting Dr. Amado consult (scheduled 5/1/19).

## 2019-04-02 ENCOUNTER — ANCILLARY PROCEDURE (OUTPATIENT)
Dept: BONE DENSITY | Facility: CLINIC | Age: 54
End: 2019-04-02
Attending: ORTHOPAEDIC SURGERY

## 2019-04-02 DIAGNOSIS — M85.88 OSTEOPENIA OF LUMBAR SPINE: ICD-10-CM

## 2019-04-09 ENCOUNTER — TELEPHONE (OUTPATIENT)
Dept: ORTHOPEDICS | Facility: CLINIC | Age: 54
End: 2019-04-09

## 2019-04-09 DIAGNOSIS — M85.9 LOW BONE DENSITY: Primary | ICD-10-CM

## 2019-04-09 RX ORDER — ERGOCALCIFEROL 1.25 MG/1
50000 CAPSULE, LIQUID FILLED ORAL WEEKLY
Qty: 12 CAPSULE | Refills: 1 | Status: ON HOLD
Start: 2019-04-09 | End: 2019-12-02

## 2019-04-09 NOTE — TELEPHONE ENCOUNTER
reviewed the 2 DEXA scans done at home at [facility] & sent to us showing Low bone density  & very Low Vit D level of 9 & he ordered Vit D supp. 50,000 units per week & Bone health consult by  preop for spine surgery planning.  Ordered both & called pt,. Who agreed & will start Vit D & schedule s appt.   I faxed our Vit D level to her primary MD DR. Mary Ellen Salgado at [facility] ph#[phone] fax#[phone].  Pt. Stated primary MD already aware of DEXA results since done in [facility] system.  Surgery date pending 's appt.   Pt. Understood.  W.O./Belinda Gary RN.

## 2019-04-29 NOTE — TELEPHONE ENCOUNTER
RECORDS RECEIVED FROM: Bone health, low bone density, recommended by Dr. Carrion - per pt   DATE RECEIVED: May 1, 2019    NOTES STATUS DETAILS   OFFICE NOTE from referring provider Internal Dr. Carrion 3/14/19   OFFICE NOTE from other specialist N/A    DISCHARGE SUMMARY from hospital N/A    DISCHARGE REPORT from the ER N/A    OPERATIVE REPORT N/A    MEDICATION LIST Internal    IMPLANT RECORD/STICKER N/A    LABS     CBC/DIFF N/A    VITAMIN D DEFICIENCY SCREENING Internal 3/14/19   CULTURES N/A    INJECTIONS DONE IN RADIOLOGY N/A    MRI Internal 2/28/19   CT SCAN Internal 2/28/19   XRAYS (IMAGES & REPORTS) Internal 4/2/19   TUMOR     PATHOLOGY  Slides & report N/A

## 2019-05-01 ENCOUNTER — PRE VISIT (OUTPATIENT)
Dept: ORTHOPEDICS | Facility: CLINIC | Age: 54
End: 2019-05-01

## 2019-05-01 ENCOUNTER — OFFICE VISIT (OUTPATIENT)
Dept: ORTHOPEDICS | Facility: CLINIC | Age: 54
End: 2019-05-01

## 2019-05-01 VITALS — WEIGHT: 194 LBS | BODY MASS INDEX: 30.45 KG/M2 | HEIGHT: 67 IN | RESPIRATION RATE: 16 BRPM

## 2019-05-01 DIAGNOSIS — M85.80 OSTEOPENIA, UNSPECIFIED LOCATION: Primary | ICD-10-CM

## 2019-05-01 RX ORDER — HYDROXYZINE PAMOATE 25 MG/1
25 CAPSULE ORAL 2 TIMES DAILY PRN
COMMUNITY
Start: 2019-04-03 | End: 2019-10-08

## 2019-05-01 ASSESSMENT — MIFFLIN-ST. JEOR: SCORE: 1506.81

## 2019-05-01 NOTE — PROGRESS NOTES
"Attending Note:   I have personally examined this patient and have reviewed the clinical presentation and progress note with the resident. I agree with the treatment plan as outlined. The plan was formulated with the resident on the day of the patient's visit. I have reviewed all imaging with the resident and agree with the findings in the documentation.     Terese Amado MD, CAQ, CCD  [facility]  Sports Medicine and Bone HealthSUBJECTIVE:    Yun Sagastume is a 54 year old female here today to disuss osteoporosis.  Previous DEXA done 4/2/19.  T-score showed her to be -1.1 at the level of the right total hip.     She has a past medical history of HTN, GERD, gastric bypass, chronic pain, depression/anxiety, DM2.  Patient was referred for bone health evaluation by Dr. Carrion, spinal surgeon, due to ongoing bone and joint pain.  She admits to having some \"balance\" problems, and reports falling 1-2x /mo, but denies sustaining any fractures.  She follows with PCP Dr. Mary Ellen Salgado - last visit 1 mo ago,  and states that all Labs and Physical is up to date. She reports having a Laminectomy in 2011 at the L4-L5 region, and is planning for future spinal surgery.  She has most recently had 3 bone density scans in the past 1 month.    cc: bone health fu  - Vitamin D Intake/Level: 67609 unit(s) weekly - started 4/2019.  Level: 9 (3/14/19)  - Calcium: no supplements, minimal dietary intake - occasional cheese and coffee creamer.  - Hx Stress Fx's: none  - Current Meds: Flexeril, atarax, morphine, oxycodone, lisinopril-hydrochlorothiazide, paroxetine.   - tob use:  Quit smoking 15 years ago.  1 PPD for 20 years.  - Caffeine Use: 2 cups/day  - Exercise: Not currently due to back/hip/leg pain  - Hx Kidney Stones: none  - Hx of Chemo, Skeletal Radiation, or Hormone Therapy: ~5 CT scans and ~10 XRays lifetime. No chemo or hormone therapy.  - Referral:  - Hx of GERD: Yes.  Stopped Omeprazole 1 month ago.  Was on " PPI for 3 years prior to that.  - Prior bisphosphonate: None  - Labs (Cr, Vit D, Ca, PTH):  Cr: 0.87, Vit D: 9, Ca: 9.9  - Prior DEXA scan: 1 month ago  - Family history of osteoporosis: none  - Steroid use: ~3 short courses of prednisone during lifetime        No past medical history on file.    Past Surgical History:   Procedure Laterality Date     INJECT EPIDURAL TRANSFORAMINAL LUMBAR / SACRAL SINGLE Bilateral 8/17/2017    Procedure: INJECT EPIDURAL TRANSFORAMINAL LUMBAR / SACRAL SINGLE;  Bilateral lumbar 4 selective nerve root blocks;  Surgeon: Ney Toure MD;  Location:  OR     INJECT EPIDURAL TRANSFORAMINAL LUMBAR / SACRAL SINGLE Bilateral 8/30/2017    Procedure: INJECT EPIDURAL TRANSFORAMINAL LUMBAR / SACRAL SINGLE;  Lumbar 5 Nerve Root Block, Bilateral;  Surgeon: Pedrito Carr MD;  Location:  OR       Current Outpatient Medications   Medication Sig Dispense Refill     cyclobenzaprine (FLEXERIL) 10 MG tablet Take 10 mg by mouth       hydrOXYzine (ATARAX) 10 MG tablet TAKE ONE TABLET BY MOUTH EVERY 6 HOURS AS NEEDED FOR PAIN  0     lisinopril-hydrochlorothiazide (PRINZIDE/ZESTORETIC) 10-12.5 MG per tablet Take 1 tablet by mouth daily       morphine (MS CONTIN) 15 MG CR tablet        OMEPRAZOLE PO Take 20 mg by mouth every morning       oxyCODONE (ROXICODONE) 5 MG tablet        oxyCODONE-acetaminophen (PERCOCET) 7.5-325 MG per tablet Take 1 tablet by mouth       PARoxetine (PAXIL) 40 MG tablet Take 60 mg by mouth        vitamin D2 (ERGOCALCIFEROL) 49807 units (1250 mcg) capsule Take 1 capsule (50,000 Units) by mouth once a week 12 capsule 1       No family history on file.    Social History     Socioeconomic History     Marital status:      Spouse name: Not on file     Number of children: Not on file     Years of education: Not on file     Highest education level: Not on file   Occupational History     Not on file   Social Needs     Financial resource strain: Not on file      Food insecurity:     Worry: Not on file     Inability: Not on file     Transportation needs:     Medical: Not on file     Non-medical: Not on file   Tobacco Use     Smoking status: Never Smoker     Smokeless tobacco: Never Used   Substance and Sexual Activity     Alcohol use: Not on file     Drug use: Not on file     Sexual activity: Not on file   Lifestyle     Physical activity:     Days per week: Not on file     Minutes per session: Not on file     Stress: Not on file   Relationships     Social connections:     Talks on phone: Not on file     Gets together: Not on file     Attends Hindu service: Not on file     Active member of club or organization: Not on file     Attends meetings of clubs or organizations: Not on file     Relationship status: Not on file     Intimate partner violence:     Fear of current or ex partner: Not on file     Emotionally abused: Not on file     Physically abused: Not on file     Forced sexual activity: Not on file   Other Topics Concern     Not on file   Social History Narrative     Not on file     OBJECTIVE:  There were no vitals taken for this visit.    CONSTITUTIONAL: healthy, alert and no distress  CARDIO: RRR, No murmur/rub/gallop  RESP:  Normal work of breathing, CTAB, no wheezes/ronchi/rales  SKIN: no suspicious lesions or rashes  GAIT: normal  NEUROLOGIC: Non-focal  PSYCHIATRIC: affect normal/bright and mentation appears normal.      DXA 4/2/2019    Region BMD T - score Z - score   L2-L4 1.289 g/cm  0.7 0.8                       Neck Left 0.935 g/cm  -0.7 -0.2   Total Left 0.906 g/cm  -0.8 -0.7             Neck Right 0.926 g/cm  -0.8 -0.3   Total Right 0.872 g/cm  -1.1 -1.0      Radius 33% 0.633 g/cm  -1.0 -0.5           ASSESSMENT:  1) Osteopenia  2) Vitamin D Deficiency  3) Inadequate calcium intake with malabsorption disease    PLAN:    - Most recent DXA scans reviewed with patient, with most negative and valid T-score of -1.1 at the level of the right hip.  -  Recommend repeat DXA in 5 years  - The importance of calcium and vitamin D in bone health was discussed in detail.   - Patient was advised to continue Vit D 50,000 unit(s) weekly as prescribed by PCP and have vit D level re-tested 12 weeks after completion of first course.  - A calcium intake of 1000 mg total per day in divided doses, to include diet and supplements, was recommended.   -  Patient was encouraged to obtain as much as the recommended amount of calcium as possible from the diet to include increasing her dairy intake, but only as tolerated due to existing GI abnormalities.     - The role of weight bearing exercise for the treatment of bone loss was encouraged and patient was advised to coordinate return to activity with PCP and spinal surgeon.  - No follow-up is needed at the clinic at this time.  Patient expressed understanding of the above plan.  - Patient was given instructions and recommendations to use of the [employer] Calcium Calculator to get an estimate of daily total intake in the diet (www.iofcalciumcalculator).     Patient was seen by and plan discussed with Attending Physician Dr. Amado who agrees with the above plan.    Guru Vikas MD  Resident Physician, Family Medicine    ---------    Terese Amado MD, CAQ, FACSM, CCD  [facility]  Sports Medicine and Bone Health  Team Physician;  Athletics

## 2019-05-01 NOTE — LETTER
"  5/1/2019      RE: Yun Sagastume  [address]  [facility] [address]       Attending Note:   I have personally examined this patient and have reviewed the clinical presentation and progress note with the resident. I agree with the treatment plan as outlined. The plan was formulated with the resident on the day of the patient's visit. I have reviewed all imaging with the resident and agree with the findings in the documentation.     Terese Amado MD, CAQ, CCD  [facility]  Sports Medicine and Bone HealthSUBJECTIVE:    Yun Sagastume is a 54 year old female here today to disuss osteoporosis.  Previous DEXA done 4/2/19.  T-score showed her to be -1.1 at the level of the right total hip.     She has a past medical history of HTN, GERD, gastric bypass, chronic pain, depression/anxiety, DM2.  Patient was referred for bone health evaluation by Dr. Carrion, spinal surgeon, due to ongoing bone and joint pain.  She admits to having some \"balance\" problems, and reports falling 1-2x /mo, but denies sustaining any fractures.  She follows with PCP Dr. Mary Ellen Salgado - last visit 1 mo ago,  and states that all Labs and Physical is up to date. She reports having a Laminectomy in 2011 at the L4-L5 region, and is planning for future spinal surgery.  She has most recently had 3 bone density scans in the past 1 month.    cc: bone health fu  - Vitamin D Intake/Level: 06975 unit(s) weekly - started 4/2019.  Level: 9 (3/14/19)  - Calcium: no supplements, minimal dietary intake - occasional cheese and coffee creamer.  - Hx Stress Fx's: none  - Current Meds: Flexeril, atarax, morphine, oxycodone, lisinopril-hydrochlorothiazide, paroxetine.   - tob use:  Quit smoking 15 years ago.  1 PPD for 20 years.  - Caffeine Use: 2 cups/day  - Exercise: Not currently due to back/hip/leg pain  - Hx Kidney Stones: none  - Hx of Chemo, Skeletal Radiation, or Hormone Therapy: ~5 CT scans and ~10 XRays lifetime. No chemo or hormone " therapy.  - Referral:  - Hx of GERD: Yes.  Stopped Omeprazole 1 month ago.  Was on PPI for 3 years prior to that.  - Prior bisphosphonate: None  - Labs (Cr, Vit D, Ca, PTH):  Cr: 0.87, Vit D: 9, Ca: 9.9  - Prior DEXA scan: 1 month ago  - Family history of osteoporosis: none  - Steroid use: ~3 short courses of prednisone during lifetime        No past medical history on file.    Past Surgical History:   Procedure Laterality Date     INJECT EPIDURAL TRANSFORAMINAL LUMBAR / SACRAL SINGLE Bilateral 8/17/2017    Procedure: INJECT EPIDURAL TRANSFORAMINAL LUMBAR / SACRAL SINGLE;  Bilateral lumbar 4 selective nerve root blocks;  Surgeon: Ney Toure MD;  Location: [facility] OR     INJECT EPIDURAL TRANSFORAMINAL LUMBAR / SACRAL SINGLE Bilateral 8/30/2017    Procedure: INJECT EPIDURAL TRANSFORAMINAL LUMBAR / SACRAL SINGLE;  Lumbar 5 Nerve Root Block, Bilateral;  Surgeon: Pedrito Carr MD;  Location: [facility] OR       Current Outpatient Medications   Medication Sig Dispense Refill     cyclobenzaprine (FLEXERIL) 10 MG tablet Take 10 mg by mouth       hydrOXYzine (ATARAX) 10 MG tablet TAKE ONE TABLET BY MOUTH EVERY 6 HOURS AS NEEDED FOR PAIN  0     lisinopril-hydrochlorothiazide (PRINZIDE/ZESTORETIC) 10-12.5 MG per tablet Take 1 tablet by mouth daily       morphine (MS CONTIN) 15 MG CR tablet        OMEPRAZOLE PO Take 20 mg by mouth every morning       oxyCODONE (ROXICODONE) 5 MG tablet        oxyCODONE-acetaminophen (PERCOCET) 7.5-325 MG per tablet Take 1 tablet by mouth       PARoxetine (PAXIL) 40 MG tablet Take 60 mg by mouth        vitamin D2 (ERGOCALCIFEROL) 23856 units (1250 mcg) capsule Take 1 capsule (50,000 Units) by mouth once a week 12 capsule 1       No family history on file.    Social History     Socioeconomic History     Marital status:      Spouse name: Not on file     Number of children: Not on file     Years of education: Not on file     Highest education level: Not on file   Occupational  History     Not on file   Social Needs     Financial resource strain: Not on file     Food insecurity:     Worry: Not on file     Inability: Not on file     Transportation needs:     Medical: Not on file     Non-medical: Not on file   Tobacco Use     Smoking status: Never Smoker     Smokeless tobacco: Never Used   Substance and Sexual Activity     Alcohol use: Not on file     Drug use: Not on file     Sexual activity: Not on file   Lifestyle     Physical activity:     Days per week: Not on file     Minutes per session: Not on file     Stress: Not on file   Relationships     Social connections:     Talks on phone: Not on file     Gets together: Not on file     Attends Muslim service: Not on file     Active member of club or organization: Not on file     Attends meetings of clubs or organizations: Not on file     Relationship status: Not on file     Intimate partner violence:     Fear of current or ex partner: Not on file     Emotionally abused: Not on file     Physically abused: Not on file     Forced sexual activity: Not on file   Other Topics Concern     Not on file   Social History Narrative     Not on file     OBJECTIVE:  There were no vitals taken for this visit.    CONSTITUTIONAL: healthy, alert and no distress  CARDIO: RRR, No murmur/rub/gallop  RESP:  Normal work of breathing, CTAB, no wheezes/ronchi/rales  SKIN: no suspicious lesions or rashes  GAIT: normal  NEUROLOGIC: Non-focal  PSYCHIATRIC: affect normal/bright and mentation appears normal.      DXA 4/2/2019    Region BMD T - score Z - score   L2-L4 1.289 g/cm  0.7 0.8                       Neck Left 0.935 g/cm  -0.7 -0.2   Total Left 0.906 g/cm  -0.8 -0.7             Neck Right 0.926 g/cm  -0.8 -0.3   Total Right 0.872 g/cm  -1.1 -1.0      Radius 33% 0.633 g/cm  -1.0 -0.5           ASSESSMENT:  1) Osteopenia  2) Vitamin D Deficiency  3) Inadequate calcium intake with malabsorption disease    PLAN:    - Most recent DXA scans reviewed with patient, with  most negative and valid T-score of -1.1 at the level of the right hip.  - Recommend repeat DXA in  5 years  - The importance of calcium and vitamin D in bone health was discussed in detail.   - Patient was advised to continue Vit D 50,000 unit(s) weekly as prescribed by PCP and have vit D level re-tested 12 weeks after completion of first course.  - A calcium intake of 1000 mg total per day in divided doses, to include diet and supplements, was recommended.   -  Patient was encouraged to obtain as much as the recommended amount of calcium as possible from the diet to include increasing her dairy intake, but only as tolerated due to existing GI abnormalities.     - The role of weight bearing exercise for the treatment of bone loss was encouraged and patient was advised to coordinate return to activity with PCP and spinal surgeon.  - No follow-up is needed at the clinic at this time.  Patient expressed understanding of the above plan.  - Patient was given instructions and recommendations to use of the [employer] Calcium Calculator to get an estimate of daily total intake in the diet (www.iofcalciumcalculator).     Patient was seen by and plan discussed with Attending Physician Dr. Amado who agrees with the above plan.    Guru Vikas MD  Resident Physician, Family Medicine    ---------    Terese Amado MD, CAQ, FACSM, CCD  [facility]  Sports Medicine and Bone Health  Team Physician;  Athletics

## 2019-05-30 ENCOUNTER — MYC MEDICAL ADVICE (OUTPATIENT)
Dept: ORTHOPEDICS | Facility: CLINIC | Age: 54
End: 2019-05-30

## 2019-07-02 ENCOUNTER — PRE VISIT (OUTPATIENT)
Dept: SURGERY | Facility: CLINIC | Age: 54
End: 2019-07-02

## 2019-07-02 NOTE — TELEPHONE ENCOUNTER
FUTURE VISIT INFORMATION      SURGERY INFORMATION:    Date: 19    Location: [facility] OR    Surgeon:  Becky Obregon    Anesthesia Type:  General    RECORDS REQUESTED FROM:       Primary Care Provider: Mary Ellen Salgado DO- [facility]    Pertinent Medical History: Hypertension    Most recent EKG+ Tracin2008- Castillo- requested tracing    Most recent Cardiac Stress Test: 3/21/12- Castillo

## 2019-07-09 PROBLEM — M47.26 OSTEOARTHRITIS OF SPINE WITH RADICULOPATHY, LUMBAR REGION: Status: ACTIVE | Noted: 2019-07-09

## 2019-07-09 PROBLEM — M40.00 KYPHOSIS (ACQUIRED) (POSTURAL): Status: ACTIVE | Noted: 2019-07-09

## 2019-07-10 ENCOUNTER — ANESTHESIA EVENT (OUTPATIENT)
Dept: SURGERY | Facility: CLINIC | Age: 54
End: 2019-07-10

## 2019-07-10 ENCOUNTER — OFFICE VISIT (OUTPATIENT)
Dept: SURGERY | Facility: CLINIC | Age: 54
End: 2019-07-10

## 2019-07-10 ENCOUNTER — ANESTHESIA EVENT (OUTPATIENT)
Dept: SURGERY | Facility: CLINIC | Age: 54
End: 2019-07-10
Payer: COMMERCIAL

## 2019-07-10 VITALS
OXYGEN SATURATION: 96 % | RESPIRATION RATE: 14 BRPM | SYSTOLIC BLOOD PRESSURE: 114 MMHG | HEIGHT: 68 IN | TEMPERATURE: 98 F | HEART RATE: 83 BPM | BODY MASS INDEX: 29.99 KG/M2 | WEIGHT: 197.9 LBS | DIASTOLIC BLOOD PRESSURE: 76 MMHG

## 2019-07-10 DIAGNOSIS — M47.9 SPONDYLOSIS: ICD-10-CM

## 2019-07-10 DIAGNOSIS — Z01.818 PREOP EXAMINATION: Primary | ICD-10-CM

## 2019-07-10 DIAGNOSIS — Z01.818 PRE-OPERATIVE GENERAL PHYSICAL EXAMINATION: ICD-10-CM

## 2019-07-10 DIAGNOSIS — Z01.818 PRE-OPERATIVE GENERAL PHYSICAL EXAMINATION: Primary | ICD-10-CM

## 2019-07-10 LAB
ALBUMIN UR-MCNC: NEGATIVE MG/DL
ANION GAP SERPL CALCULATED.3IONS-SCNC: 4 MMOL/L (ref 3–14)
APPEARANCE UR: CLEAR
BILIRUB UR QL STRIP: NEGATIVE
BUN SERPL-MCNC: 12 MG/DL (ref 7–30)
CALCIUM SERPL-MCNC: 9.2 MG/DL (ref 8.5–10.1)
CHLORIDE SERPL-SCNC: 99 MMOL/L (ref 94–109)
CO2 SERPL-SCNC: 32 MMOL/L (ref 20–32)
COLOR UR AUTO: NORMAL
CREAT SERPL-MCNC: 1.05 MG/DL (ref 0.52–1.04)
ERYTHROCYTE [DISTWIDTH] IN BLOOD BY AUTOMATED COUNT: 15.4 % (ref 10–15)
GFR SERPL CREATININE-BSD FRML MDRD: 60 ML/MIN/{1.73_M2}
GLUCOSE SERPL-MCNC: 111 MG/DL (ref 70–99)
GLUCOSE UR STRIP-MCNC: NEGATIVE MG/DL
HBA1C MFR BLD: 7.3 % (ref 0–5.6)
HCT VFR BLD AUTO: 40.2 % (ref 35–47)
HGB BLD-MCNC: 12.8 G/DL (ref 11.7–15.7)
HGB UR QL STRIP: NEGATIVE
KETONES UR STRIP-MCNC: NEGATIVE MG/DL
LEUKOCYTE ESTERASE UR QL STRIP: NEGATIVE
MCH RBC QN AUTO: 29 PG (ref 26.5–33)
MCHC RBC AUTO-ENTMCNC: 31.8 G/DL (ref 31.5–36.5)
MCV RBC AUTO: 91 FL (ref 78–100)
MRSA DNA SPEC QL NAA+PROBE: NEGATIVE
NITRATE UR QL: NEGATIVE
PH UR STRIP: 7 PH (ref 5–7)
PLATELET # BLD AUTO: 287 10E9/L (ref 150–450)
POTASSIUM SERPL-SCNC: 4.1 MMOL/L (ref 3.4–5.3)
RBC # BLD AUTO: 4.41 10E12/L (ref 3.8–5.2)
SODIUM SERPL-SCNC: 134 MMOL/L (ref 133–144)
SOURCE: NORMAL
SP GR UR STRIP: 1.01 (ref 1–1.03)
SPECIMEN SOURCE: NORMAL
UROBILINOGEN UR STRIP-MCNC: 0 MG/DL (ref 0–2)
WBC # BLD AUTO: 5 10E9/L (ref 4–11)

## 2019-07-10 RX ORDER — ACETAMINOPHEN 500 MG
1000 TABLET ORAL EVERY 6 HOURS PRN
Status: ON HOLD | COMMUNITY
End: 2019-08-26

## 2019-07-10 RX ORDER — OXYCODONE HYDROCHLORIDE 10 MG/1
10 TABLET ORAL EVERY 6 HOURS PRN
Status: ON HOLD | COMMUNITY
End: 2019-08-06

## 2019-07-10 RX ORDER — ACETAMINOPHEN 325 MG/1
975 TABLET ORAL ONCE
Status: CANCELLED | OUTPATIENT
Start: 2019-07-29

## 2019-07-10 RX ORDER — GABAPENTIN 300 MG/1
300 CAPSULE ORAL ONCE
Status: CANCELLED | OUTPATIENT
Start: 2019-07-29

## 2019-07-10 ASSESSMENT — MIFFLIN-ST. JEOR: SCORE: 1541.17

## 2019-07-10 ASSESSMENT — PAIN SCALES - GENERAL: PAINLEVEL: MODERATE PAIN (5)

## 2019-07-10 ASSESSMENT — LIFESTYLE VARIABLES: TOBACCO_USE: 1

## 2019-07-10 NOTE — ANESTHESIA PREPROCEDURE EVALUATION
Anesthesia Pre-Procedure Evaluation    Patient: Yun Sagastume   MRN:     [id number] Gender:   female   Age:    54 year old :      1965        Preoperative Diagnosis: * No surgery found *        Past Medical History:   Diagnosis Date     Colon polyp      Depression      Diabetes (H)     Type 2     Gastric bypass status for obesity      HTN (hypertension)      Osteopenia      Post laminectomy syndrome       Past Surgical History:   Procedure Laterality Date     INJECT EPIDURAL TRANSFORAMINAL LUMBAR / SACRAL SINGLE Bilateral 2017    Procedure: INJECT EPIDURAL TRANSFORAMINAL LUMBAR / SACRAL SINGLE;  Bilateral lumbar 4 selective nerve root blocks;  Surgeon: Ney Toure MD;  Location:  OR     INJECT EPIDURAL TRANSFORAMINAL LUMBAR / SACRAL SINGLE Bilateral 2017    Procedure: INJECT EPIDURAL TRANSFORAMINAL LUMBAR / SACRAL SINGLE;  Lumbar 5 Nerve Root Block, Bilateral;  Surgeon: Pedrito Carr MD;  Location:  OR          Anesthesia Evaluation     . Pt has had prior anesthetic. Type: General, Regional and MAC    History of anesthetic complications   - PONV        ROS/MED HX    ENT/Pulmonary: Comment: Tested and negative    (+)GERALDO risk factors snores loudly, hypertension, tobacco use, Past use 20 pack years - quit 15 years ago packs/day  , . .    Neurologic:     (+)neuropathy - feet, hands: possible Raynaud's , other neuro Balance challenges, cervical paraspinal spasms, chart hx hyperreflexia    Cardiovascular:     (+) hypertension----. : . . . :. valvular problems/murmurs told she had intermittent murmur:. Previous cardiac testing date:results:Stress Testdate: results:STRESS ECHO : negative for objective markers of exertional myocardial ischemia or previous MI. The low level of cardiac stress provoked severely limits the clinical utility of the findings reported herein.   LV function normal EF60%. No RWMA.    date: results: date: results:          METS/Exercise  Tolerance:  3 - Able to walk 1-2 blocks without stopping   Hematologic: Comments: Hx anemia - abnormal uterine bleeding    (+) Anemia, History of Transfusion no previous transfusion reaction -      Musculoskeletal:   (+)  other musculoskeletal- osteopenia      GI/Hepatic: Comment: Bariatric surgery status SP Buster-en-Y        Renal/Genitourinary:  - ROS Renal section negative       Endo: Comment: Gestational diabetes    (+) type II DM Last HgA1c: 6.2% date: 12/2018 Not using insulin - not using insulin pump not previously admitted for DM/DKA Diabetic complications: neuropathy, Obesity, .      Psychiatric:     (+) psychiatric history anxiety and depression      Infectious Disease:  - neg infectious disease ROS       Malignancy:      - no malignancy   Other:    (+) No chance of pregnancy H/O Chronic Pain,H/O chronic opiod use ,                        PHYSICAL EXAM:   Mental Status/Neuro: A/A/O   Airway:   Mallampati: IV  Mouth/Opening: Full  TM distance: > 6 cm  Neck ROM: Full   Respiratory: Auscultation: CTAB     Resp. Rate: Normal     Resp. Effort: Normal      CV: Rhythm: Regular  Rate: Age appropriate  Heart: Normal Sounds  CV Other: pedal pulses difficult to feel, feet warm somewhat dusky look at toes, capillary refill slight amalia   Comments:      Dental:  Dentures: Upper; Lower                  No results found for: WBC, HGB, HCT, PLT, CRP, SED, NA, POTASSIUM, CHLORIDE, CO2, BUN, CR, GLC, MARY JO, PHOS, MAG, ALBUMIN, PROTTOTAL, ALT, AST, GGT, ALKPHOS, BILITOTAL, BILIDIRECT, LIPASE, AMYLASE, ADAMA, PTT, INR, FIBR, TSH, T4, T3, HCG, HCGS, CKTOTAL, CKMB, TROPN    Preop Vitals  BP Readings from Last 3 Encounters:   08/30/17 110/70   08/17/17 121/89    Pulse Readings from Last 3 Encounters:   08/30/17 71   08/17/17 102      Resp Readings from Last 3 Encounters:   05/01/19 16   08/30/17 12   08/17/17 16    SpO2 Readings from Last 3 Encounters:   08/30/17 97%   08/17/17 100%      Temp Readings from Last 1 Encounters:  "  08/30/17 98  F (36.7  C) (Oral)    Ht Readings from Last 1 Encounters:   05/01/19 1.701 m (5' 6.95\")      Wt Readings from Last 1 Encounters:   05/01/19 88 kg (194 lb)    Estimated body mass index is 30.43 kg/m  as calculated from the following:    Height as of 5/1/19: 1.701 m (5' 6.95\").    Weight as of 5/1/19: 88 kg (194 lb).     LDA:  Peripheral IV 08/17/17 Left Hand (Active)   Number of days: 692            JZG FV AN PLAN NO PONV RULE         PAC Discussion and Assessment    ASA Classification: 3  Case is suitable for:   Anesthetic techniques and relevant risks discussed: GA  Invasive monitoring and risk discussed:   Types:   Possibility and Risk of blood transfusion discussed: Yes  NPO instructions given:   Additional anesthetic preparation and risks discussed:   Needs early admission to pre-op area:   Other:     PAC Resident/NP Anesthesia Assessment:  Yun Sagastume is a 54 year old female for complex spinal surgery including supine anterior lumbar interbody fusion L4-5, L5-S1; prone posterior instrumented spinal fusion L3-pelvis, osteotomies, in treatment of spondylosis, stenosis and kyphosis. PAC referral for risk assessment and optimization for anesthesia with comorbid conditions: HTN, anemia, ex-smoker, diabetes, osteopenia.  Pre-operative considerations include:  1.) CV: Cradiac risks of : Hypertension managed with Lisinopril-hydrochlorothiazide; Ex-smoker of 20 pack years -quit 15 years ago. No known CAD. Funtional status independent. Exercise tolerance < 4 METS due to MSK restrictions.   STRESS ECHO 2012 for atypical CP:  negative for objective markers of exertional myocardial ischemia or previous MI. The low level of cardiac stress provoked severely limits the clinical utility of the findings reported herein. LV function normal EF60%. No RWMA.   Hx of prolonged Qt - no recent EKG. No recurrence of sx, NO current P, HOWE, palpitations.   RCRI = 0 reflecting 0.4% risk of major adverse cardiac " event  EKG today  2.) Pulmonary: Smoked x 20 yrs - quit 15 years ago. No pulmonary dx. Sx or meds.  GERALDO tested and negative / risk is 1/8 = low risk  3.) Heme: No bleeding or clotting dysfunction. Hx blood transfusion 8 years ago - no adverse reaction. Pt unsure of setting of transfusion. Last HGB was in December was 12.8. She is postmenopausal.  CBC, type and screen. Surgery notes reflect they will cross for 1 unit.  VTE risk is low  4.) ENDO: Type 2 diabetes without complications, on diet management. HGBa1c 6.2 % reflects fairly good control but need update.   HGBA1C today, BMP  5.) GI: S/P Rounx-en-Y gastric bypass with no complications. BMI 29. GERD intermittent -no daily PPI.   PONV score = 2 (2 or > antiemetic prophylaxis recommended).    Anesthesia: Gastric bypass,  laminectomy 2011  - no prior reported problems. Small mouth opening, MAll IV. Full dentures.     Reviewed and Signed by PAC Mid-Level Provider/Resident  Mid-Level Provider/Resident: Nataly Machado PA-C  Date: 7/10/19  Time: 11:15 AM    Attending Anesthesiologist Anesthesia Assessment:        Anesthesiologist:   Date:   Time:   Pass/Fail:   Disposition:     PAC Pharmacist Assessment:        Pharmacist:   Date:   Time:        CRISTINA Ba

## 2019-07-10 NOTE — H&P
Patient:   MIKEL PEREZ            MRN: [id number]            FIN: [id number]              Age:   48 years     Sex:  FEMALE     :  69   Associated Diagnoses:   None   Author:   BENJI WALDEN     Chief Complaint   weight loss, abdominal pain     History of Present Illness             The patient presents with.     weight loss, abdominal pain     Histories   Past Med History: Past Medical History   Acid reflux  Carpal tunnel syndrome  Chronic pain  Detached retina, right  H/O: blood transfusion  History of sickle cell anemia  Hypothyroid  Insulin dependent diabetes mellitus  Liver enzyme elevation    Family History:    Sickle cell anemia  CHILD  Myocardial infarction  GRANDMOTHER  Hypertension  MOTHER    Procedure History:    Pain consultation (SNOMED CT 0400033669) performed by ELISA SOTO on 2016 at 44 Years.  port a cath in the month of 2013 at 42 Years.  Comments:  2017 12:25 - Tigist Gonsalez  removed 2014  IVC filter in  at 42 Years.   in  at 31 Years.  detached retina.  tubal ligation.  hysterectomy.  hip replacement.  laparoscopic cholecystectomy.  pancreatic surgery.  Comments:  2017 12:23 - Tigist Gonsalez  partial removal of pancreas due to cancer  Carpal tunnel release (SNOMED CT 874136603).   Social History       Alcohol  Details: Use: None.  Exercise  Details: Exercise: Regularly.  Duration (mins): 45.  Times Per Week: 1-2 times/week.; Comment(s): cardio  Details: Excercise: Occasionally.  Home/Environment  Details: Alcohol Abuse in Household: No.  Substance Abuse in Household: No.  Smoker in Household: No.  Patient Lives With: Daughter, Spouse.  Living Situation: Lives With Family.  Ambulation: Independent.  Bathing: Independent.  Dressing: Independent.  Driving: Independent.  Eating: Independent.  Elimination: Independent.  Grooming: Independent.  Preparing Meal: Independent.  Taking Meds: Independent.  Toileting:    Pre-Operative H & P     CC:  Preoperative exam to assess for increased cardiopulmonary risk while undergoing surgery and anesthesia.    Date of Encounter: 7/10/2019  Primary Care Physician:  Mary Ellen Salgado  Yun Sagastume is a 54 year old female who presents for pre-operative H & P in preparation for supine anterior lumbar interbody fusion L4-5, L5-S1, prone posterior instrumented spinal fusion L3 to pelvis, osteotomies, screws, with Dr. Quiles and Dr. Becky Grant, on 7/29/19 at [facility].  She is status post laminectomy 2011 at L4-L5 with complications of post laminectomy syndrome.  She has had lower back pain with decreased mobility, radiating into the buttocks.    Her pain is chronic and she is on chronic narcotics, initially morphine and oxycodone which she has recently weaned off the morphine and takes oxycodone 10 mg 4 times a day, Flexeril twice a day, and Vistaril.  She has been able to time her medication to get adequate pain control.    She has comorbidities of well-controlled hypertension, type 2 diabetes diet controlled, and she is status post Buster-en-Y bariatric surgery without any complications.  History is obtained from the patient.     Past Medical History  Past Medical History:   Diagnosis Date    Balance disturbance      Colon polyp      Depression      Diabetes (H)     Type 2     Gastric bypass status for obesity      HTN (hypertension)      Osteopenia      Post laminectomy syndrome        Past Surgical History  Past Surgical History:   Procedure Laterality Date     INJECT EPIDURAL TRANSFORAMINAL LUMBAR / SACRAL SINGLE Bilateral 8/17/2017    Procedure: INJECT EPIDURAL TRANSFORAMINAL LUMBAR / SACRAL SINGLE;  Bilateral lumbar 4 selective nerve root blocks;  Surgeon: Ney Toure MD;  Location:  OR     INJECT EPIDURAL TRANSFORAMINAL LUMBAR / SACRAL SINGLE Bilateral 8/30/2017    Procedure: INJECT EPIDURAL TRANSFORAMINAL  Independent.  Transfers:  Independent.  Current Home Treatments: Blood Glucose Monitoring.  Assistive Devices Home: Contact Lenses, Glasses.  Substance Abuse  Details: Use: None.  Tobacco  Details: Used in Last 12 Months: No.  Use: Never smoker.  Cultural/Congregation Practices  Details: Congregation or Cultural Practices: Judaism.  Congregation or Cultural Practices While in Hospital: No.  .       Health Status   Allergies:    Allergic Reactions (Selected)  Severe  Bee Stings- Swelling, hands throat.   Current medications:  (Selected)   Prescriptions  Prescribed  atenolol oral 50 mg tablet: 50 mg, 1 tab, Oral, Daily, for 30 days, 30 tab, 1 Refill(s)  methIMAzole oral 10 mg tablet: 10 mg, 1 tab, Oral, BID, for 30 days, 60 tab, 1 Refill(s)  Documented Medications  Documented  NovoLO unit, Subcutaneous, TID [before meals], before breakfast  famotidine oral 20 mg tablet: 20 mg, 1 tab, Oral, Daily, 30 tab  folic acid oral 1 mg tablet: 1 mg, 1 tab, Oral, Daily  hydroxyurea 500 mg  oral capsule: 1,000 mg, 2 cap, Oral, Daily  pantoprazole oral 40 mg DR tablet: 40 mg, 1 tab, Oral, Daily, 30 tab     Physical Examination   General:  Alert and oriented, No acute distress.    Respiratory:  Lungs are clear to auscultation.    Cardiovascular:  Regular rhythm, No murmur.    Gastrointestinal:  Soft, Non-tender, Non-distended.    Neurologic:  No focal deficits.      Impression and Plan   abdominal pain   weight loss    plan  egd / colon   LUMBAR / SACRAL SINGLE;  Lumbar 5 Nerve Root Block, Bilateral;  Surgeon: Pedrito Carr MD;  Location: [facility]       Hx of Blood transfusions/reactions: yes 8 years ago     Hx of abnormal bleeding or anti-platelet use: no    Menstrual history: No LMP recorded. S/P Hysterectomy    Steroid use in the last year: no    Personal or FH with difficulty with Anesthesia:  Post-op nausea    Prior to Admission Medications  Current Outpatient Medications   Medication Sig Dispense Refill     acetaminophen (TYLENOL) 500 MG tablet Take 1,000 mg by mouth every 6 hours as needed for mild pain       cyclobenzaprine (FLEXERIL) 10 MG tablet Take 10 mg by mouth 2 times daily as needed        hydrOXYzine (VISTARIL) 25 MG capsule Take 25 mg by mouth 2 times daily as needed        lisinopril-hydrochlorothiazide (PRINZIDE/ZESTORETIC) 10-12.5 MG per tablet Take 1 tablet by mouth daily       oxyCODONE IR (ROXICODONE) 10 MG tablet Take 10 mg by mouth every 6 hours as needed for severe pain       PARoxetine (PAXIL) 40 MG tablet Take 60 mg by mouth every morning        ranitidine (ZANTAC) 150 MG tablet Take 150 mg by mouth 2 times daily as needed for heartburn       vitamin D2 (ERGOCALCIFEROL) 23410 units (1250 mcg) capsule Take 1 capsule (50,000 Units) by mouth once a week (Patient taking differently: Take 50,000 Units by mouth once a week ) 12 capsule 1       Allergies  Allergies   Allergen Reactions     Aspirin      Codeine Nausea     20 years ago     Nsaids      Amoxicillin Rash       Social History  Social History     Socioeconomic History     Marital status:      Number of children: 2     Tobacco Use     Smoking status: Former Smoker     Types: Cigarettes     Last attempt to quit:      Years since quitting: 15.5     Smokeless tobacco: Never Used   Substance and Sexual Activity     Alcohol use: Yes, vodka 2 drink in frequently     Family History  No family history of heart disease  Mother  from colon and gastric  cancer  Father  from kidney cancer / ? Pancreatic  2 sisters and 2 brothers - all alive    Anesthesia Evaluation  Pt has had prior anesthetic. Type: General, Regional and MAC    History of anesthetic complications   - PONV        ROS/MED HX    ENT/Pulmonary: Comment: Tested and negative    (+)GERALDO risk factors snores loudly, hypertension, age  tobacco use, Past use 20 pack years - quit 15 years ago packs/day  , . .    Neurologic:     (+)neuropathy - feet, hands: possible Raynaud's , other neuro Balance challenges, cervical paraspinal spasms, chart hx hyperreflexia    Cardiovascular:     (+) hypertension----. : . . . :. valvular problems/murmurs told she had intermittent murmur:. Previous cardiac testing date:results:Stress Testdate: results:STRESS ECHO : negative for objective markers of exertional myocardial ischemia or previous MI. The low level of cardiac stress provoked severely limits the clinical utility of the findings reported herein.   LV function normal EF60%. No RWMA.        METS/Exercise Tolerance:  3 - Able to walk 1-2 blocks without stopping   Hematologic: Comments: Hx anemia - abnormal uterine bleeding    (+) Anemia, History of Transfusion no previous transfusion reaction -      Musculoskeletal:   (+)  other musculoskeletal- osteopenia      GI/Hepatic: Comment: Bariatric surgery status SP Buster-en-Y        Renal/Genitourinary:  - ROS Renal section negative       Endo: Comment: Gestational diabetes    (+) type II DM Last HgA1c: 6.2% date: 2018 Not using insulin - not using insulin pump not previously admitted for DM/DKA Diabetic complications: neuropathy, Obesity, .      Psychiatric:     (+) psychiatric history anxiety and depression      Infectious Disease:  - neg infectious disease ROS       Malignancy:      - no malignancy   Other:    (+) No chance of pregnancy H/O Chronic Pain,H/O chronic opiod use ,            The complete review of systems is negative other than noted in the HPI  "or here.   Temp: 98  F (36.7  C) Temp src: Oral BP: 114/76 Pulse: 83   Resp: 14 SpO2: 96 %         197 lbs 14.4 oz  5' 8\"   Body mass index is 30.09 kg/m .       Physical Exam  Constitutional: Awake, alert, cooperative, no apparent distress, and appears stated age.  Eyes: Pupils equal, round and reactive to light, extra ocular muscles intact, sclera clear, conjunctiva normal.  HENT: Normocephalic, oral pharynx with moist mucus membranes, full dentures.  No goiter appreciated.   Respiratory: Clear to auscultation bilaterally, no crackles or wheezing.  Cardiovascular: Regular rate and rhythm, normal S1 and S2, and no murmur noted.  Carotids +1, no bruits. No LE edema. Palpable pulses to radial . Pedal pulses difficult to feel, feet warm, somewhat dusky in appearance with delayed capillary refill.  GI: Normal bowel sounds, soft, non-distended, non-tender, no masses palpated, no hepatosplenomegaly.  Surgical scars: low suprapubic area - well healed horizontal scar  Lymph/Hematologic: No cervical lymphadenopathy and no supraclavicular lymphadenopathy.  Genitourinary:  deferred  Skin: Warm and dry.  No rashes at anticipated surgical site.   Musculoskeletal: Full ROM of neck. There is no redness, warmth, or swelling of the joints. Gross motor strength BUE is normal. LE not tested  Neurologic: Awake, alert, oriented to name, place and time. Cranial nerves II-XII are grossly intact. Gait is normal.   Neuropsychiatric: Calm, cooperative. Normal affect.     Labs: (personally reviewed)    EKG: Personally reviewed but formal cardiology read pending: low voltage. NSR  Cardiac STRESS ECHO 2012: negative for objective markers of exertional myocardial ischemia or previous MI. The low level of cardiac stress provoked severely limits the clinical utility of the findings reported herein. LV function normal EF60%. No RWMA.   Outside records reviewed from: [facility]  Yun Sagastume is a 54 year old female scheduled " to undergo complex spinal surgery including supine anterior lumbar interbody fusion L4-5, L5-S1; prone posterior instrumented spinal fusion L3-pelvis, osteotomies, in treatment of spondylosis, stenosis and kyphosis.       Pre-operative considerations include:  1.) CV: Cradiac risks of : Hypertension managed with Lisinopril-hydrochlorothiazide; Ex-smoker of 20 pack years -quit 15 years ago. No known CAD. Funtional status independent. Exercise tolerance < 4 METS due to MSK restrictions.   STRESS ECHO 2012 for atypical CP:  negative for objective markers of exertional myocardial ischemia or previous MI. The low level of cardiac stress provoked severely limits the clinical utility of the findings reported herein. LV function normal EF60%. No RWMA.   Hx of prolonged Qt - no recent EKG. No recurrence of sx, NO current P, HOWE, palpitations.   RCRI = 0 reflecting 0.4% risk of major adverse cardiac event  EKG today - normal Qtc    2.) Pulmonary: Smoked x 20 yrs - quit 15 years ago. No pulmonary dx. Sx or meds.  GERALDO tested and negative / risk is 1/8 = low risk    3.) Heme: No bleeding or clotting dysfunction. Hx blood transfusion 8 years ago - no adverse reaction. Pt unsure of setting of transfusion. Last HGB was in December was 12.8. She is postmenopausal.  CBC, type and screen. Surgery notes reflect they will cross for 1 unit.  VTE risk is low    4.) ENDO: Type 2 diabetes without complications, on diet management. HGBa1c 6.2 % reflects fairly good control but need update.   HGBA1C today, BMP    5.) GI: S/P Rounx-en-Y gastric bypass with no complications. BMI 29. GERD intermittent -no daily PPI.   PONV score = 2 (2 or > antiemetic prophylaxis recommended).    Anesthesia: Gastric bypass,  laminectomy   - no prior reported problems. Small mouth opening, Mall IV / good translocation, FROM neck. Full dentures    Discussed with Dr. Farias. OK for [facility].  Patient is optimized and is acceptable candidate for the proposed  procedure.  No further diagnostic evaluation is needed.        - Anesthesia considerations:  Refer to PAC assessment in anesthesia records  - If on chronic opiates, morphine equivalent = 60mg/24 hr--> saw pharmacist today - see his note      Patient was discussed with Dr Farias.   CRISTINA Ba  Preoperative Assessment Center  [facility]  Phone: [phone]  Fax: [phone]

## 2019-07-10 NOTE — ADDENDUM NOTE
Addendum  created 07/10/19 1147 by Nataly Machado PA    Diagnosis association updated, Order list changed, Sign clinical note

## 2019-07-10 NOTE — PROGRESS NOTES
Preoperative Assessment Center medication history for July 10, 2019 is complete.    See Epic admission navigator for prior to admission medications.   Operating room staff will still need to confirm medications and last dose information on day of surgery.     Medication history interview sources:  Patient INterview    Changes made to PTA medication list (reason)  Added:   -- acetaminophen   -- ranitidine     Deleted:   -- ms contin   -- percocet  -- atarax  -- omeprazole    Changed:   -- dose of oxyCODONE updated    Additional medication history information (including reliability of information, actions taken by pharmacist):None    -- No recent (within 30 days) course of antibiotics  -- No recent (within 30 days) course of steroids  -- No recent (within 30 days) chronic daily medications stopped   -- Patient declines being on any other prescription or over-the-counter medications  -- patient averaging 4 tablets of oxyCODONE 10mg per day.     Prior to Admission medications    Medication Sig Last Dose Taking? Auth Provider   acetaminophen (TYLENOL) 500 MG tablet Take 1,000 mg by mouth every 6 hours as needed for mild pain Taking Yes Unknown, Entered By History   cyclobenzaprine (FLEXERIL) 10 MG tablet Take 10 mg by mouth 2 times daily as needed  Taking Yes Reported, Patient   hydrOXYzine (VISTARIL) 25 MG capsule Take 25 mg by mouth 2 times daily as needed  Taking Yes Reported, Patient   lisinopril-hydrochlorothiazide (PRINZIDE/ZESTORETIC) 10-12.5 MG per tablet Take 1 tablet by mouth daily Taking Yes Reported, Patient   oxyCODONE IR (ROXICODONE) 10 MG tablet Take 10 mg by mouth every 6 hours as needed for severe pain Taking Yes Unknown, Entered By History   PARoxetine (PAXIL) 40 MG tablet Take 60 mg by mouth every morning  Taking Yes Reported, Patient   ranitidine (ZANTAC) 150 MG tablet Take 150 mg by mouth 2 times daily as needed for heartburn Taking Yes Unknown, Entered By History   vitamin D2 (ERGOCALCIFEROL) 10635  units (1250 mcg) capsule Take 1 capsule (50,000 Units) by mouth once a week  Patient taking differently: Take 50,000 Units by mouth once a week Fridays Taking Yes Jeremiah Carrion MD         Medication history completed by: Leon Modi [facility]

## 2019-07-10 NOTE — ADDENDUM NOTE
Addendum  created 07/10/19 1204 by Nataly Machado PA    Order list changed, Order sets accessed, Sign clinical note

## 2019-07-10 NOTE — ADDENDUM NOTE
Addendum  created 07/10/19 1155 by Nataly Machado PA    Diagnosis association updated, Order list changed, Order sets accessed, Sign clinical note

## 2019-07-10 NOTE — PATIENT INSTRUCTIONS
Preparing for Your Surgery      Name:  Yun Sagastume   MRN:  [id number]   :  1965   Today's Date:  7/10/2019     Arriving for surgery:  Surgery date:  19  Arrival time:  5:30AM  Please come to:     [facility] [address]  [address] [place]  [address]    - parking is available in front of [facility] from 5:15AM to 8:00PM. If you prefer, park your car in the Green Lot.    -Proceed to the 3rd floor, check in at the Adult Surgery Waiting Lounge. [phone]    If an escort is needed stop at the Information Desk in the lobby. Inform the information person that you are here for surgery. An escort to the Adult Surgery Waiting Lounge will be provided.        What can I eat or drink?  -  You may have solid food or milk products until 8 hours prior to your surgery. 19, 11PM  -  You may have Gatorade, Clear Ensure, water, apple juice or 7up/Sprite until 2 hours prior to your surgery. 19, 5AM    Which medicines can I take?  Stop Aspirin, vitamins and supplements one week prior to surgery.  Hold Ibuprofen for 24 hours and/or Naproxen for 48 hours prior to surgery.          -  Please Do Not take these medications the day of surgery:   Lisinopril-hydrochlorothiazide    -  Please take these medications the day of surgery:    Paroxetine(Paxil)  Ranitidine  As Needed:    Acetaminophen  Cyclobenzaprine(Flexeril)    Hydroxyzine(Vistaril)  Oxycodone(Roxicodone)    How do I prepare myself?  -  Take two showers: one the night before surgery; and one the morning of surgery.         Use Scrubcare or Hibiclens to wash from neck down.  You may use your own shampoo and conditioner. No other hair products.   -  Do NOT use lotion, powder, deodorant, or antiperspirant the day of your surgery.  -  Do NOT wear any makeup, fingernail polish or jewelry.  -  Begin using Incentive Spirometer 1 week prior to surgery.  Use 4 times per day, up to 5 breaths each time.   Bring Incentive Spirometer to hospital.  - Do not bring your own medications to the hospital, except for inhalers and eye drops.  -  Bring your ID and insurance card.    Questions or Concerns:  -If you have questions or concerns regarding the day of surgery, please call Preadmission Nursing, [phone].     -For questions after surgery please call your surgeons office.           Enhanced Recovery After Surgery     This is a team effort, including you, to get you back on your feet, eating and drinking normally and out of the hospital as quickly as possible.  The goals are: 1) NO INFECTIONS and   2) RETURN TO NORMAL DIET    How can we achieve these goals?  1) STAY ACTIVE: Walk every day before your surgery; try to increase the amount every day.  Walk after surgery as much as you can-the nurses will help you.  Walking speeds healing and gets you home quicker, you heal better at home and have less risk of infection.     2) INCENTIVE SPIROMETER: Practice your incentive spirometer 4 times per day with 5 repetitions each time.  Using the incentive spirometer can strengthen your muscles between your ribs and help you have a strong cough after surgery.  A more effective cough can help prevent problems with your lungs.    3) STAY HYDRATED: Drink clear liquids up until 2 hours before your surgery. We would like you to purchase a drink such as Gatorade or Ensure Clear (not the milkshake type).  Drink this before bedtime and on the way into the hospital, drink between 8-10 ounces or until you feel hydrated.  Keeping well hydrated leads to your veins being plump, you wake up faster, and you are less likely to be nauseated. Start drinking water as soon as you can after surgery and advance to clear liquids and food as tolerated.  IV fluids contain salt, drinking fluids will minimize the amount of IV fluids you need and decrease the amount of salt you get.    The most common reason for the patient to be readmitted is dehydration.  Staying hydrated after you go home from the hospital is very important.  Ensure or Ensure Clear are good options to keep you hydrated.     4) PAIN MANAGEMENT: If we minimize the amount of opioids and narcotics, and use regional blocks (which numb the area where your surgery is) along with oral pain medications; you will have less side effects of nausea and constipation. Narcotics can slow down your bowels and cause you to stay in the hospital longer.     Our goal is to keep you comfortable; eating and drinking normally and back home safely.     Using an Incentive Spirometer    An incentive spirometer is a device that helps you do deep breathing exercises. These exercises expand your lungs, aid in circulation, and help prevent pneumonia. Deep breathing exercises also help you breathe better and improve the function of your lungs by:    Keeping your lungs clear    Strengthening your breathing muscles    Helping prevent respiratory complications or problems  The incentive spirometer gives you a way to take an active part in your care. A nurse or therapist will teach you breathing exercises. To do these exercises, you will breathe in through your mouth and not your nose. The incentive spirometer only works correctly if you breathe in through your mouth.    Steps to clear lungs  Step 1. Exhale normally. Then, inhale normally.    Relax and breathe out.  Step 2. Place your lips tightly around the mouthpiece.    Make sure the device is upright and not tilted.  Step 3. Inhale as much air as you can through the mouthpiece (don't breath through your nose).    Inhale slowly and deeply.    Hold your breath long enough to keep the balls or disk raised for at least 3 to 5 seconds, or as instructed by your healthcare provider.  Step 4. Repeat the exercise regularly.  Begin using the Incentive Spirometer one week prior to your surgery, 4 times per day-5 times each.

## 2019-07-11 LAB — DEPRECATED CALCIDIOL+CALCIFEROL SERPL-MC: 19 UG/L (ref 20–75)

## 2019-07-13 NOTE — PROGRESS NOTES
Positive nasal swab for staph aureus .  I spoke with patient and called in script for Mupirocin 2% ointment to apply intranasally BID x 7 days pre-op.    Patient understood and will begin treatment.  Nataly Machado PA-C

## 2019-07-17 ENCOUNTER — DOCUMENTATION ONLY (OUTPATIENT)
Dept: CARE COORDINATION | Facility: CLINIC | Age: 54
End: 2019-07-17

## 2019-07-18 DIAGNOSIS — R09.89 DECREASED PULSES IN FEET: Primary | ICD-10-CM

## 2019-07-22 ENCOUNTER — TELEPHONE (OUTPATIENT)
Dept: ORTHOPEDICS | Facility: CLINIC | Age: 54
End: 2019-07-22

## 2019-07-22 NOTE — TELEPHONE ENCOUNTER
Called pt and informed her That Dr. Carrion and Dr. Grant want her to get a OVI prior to surgery.     Gave number to schedule    Nkechi BRYANT

## 2019-07-25 ENCOUNTER — ANCILLARY PROCEDURE (OUTPATIENT)
Dept: ULTRASOUND IMAGING | Facility: CLINIC | Age: 54
End: 2019-07-25
Attending: SURGERY

## 2019-07-25 ENCOUNTER — MYC MEDICAL ADVICE (OUTPATIENT)
Dept: ORTHOPEDICS | Facility: CLINIC | Age: 54
End: 2019-07-25

## 2019-07-25 DIAGNOSIS — R09.89 DECREASED PULSES IN FEET: ICD-10-CM

## 2019-07-28 ASSESSMENT — LIFESTYLE VARIABLES: TOBACCO_USE: 1

## 2019-07-29 ENCOUNTER — HOSPITAL ENCOUNTER (INPATIENT)
Facility: CLINIC | Age: 54
LOS: 8 days | Discharge: HOME HEALTH | End: 2019-08-06
Attending: ORTHOPAEDIC SURGERY | Admitting: ORTHOPAEDIC SURGERY
Payer: COMMERCIAL

## 2019-07-29 ENCOUNTER — APPOINTMENT (OUTPATIENT)
Dept: GENERAL RADIOLOGY | Facility: CLINIC | Age: 54
End: 2019-07-29
Attending: ORTHOPAEDIC SURGERY
Payer: COMMERCIAL

## 2019-07-29 ENCOUNTER — ANESTHESIA (OUTPATIENT)
Dept: SURGERY | Facility: CLINIC | Age: 54
End: 2019-07-29
Payer: COMMERCIAL

## 2019-07-29 DIAGNOSIS — Z98.890 S/P SPINAL SURGERY: Primary | ICD-10-CM

## 2019-07-29 PROBLEM — E55.9 VITAMIN D DEFICIENCY: Status: ACTIVE | Noted: 2017-10-01

## 2019-07-29 PROBLEM — I10 HTN (HYPERTENSION): Status: ACTIVE | Noted: 2017-12-18

## 2019-07-29 PROBLEM — M85.89 OSTEOPENIA OF MULTIPLE SITES: Status: ACTIVE | Noted: 2019-04-02

## 2019-07-29 LAB
ABO + RH BLD: NORMAL
ABO + RH BLD: NORMAL
BASE EXCESS BLDA CALC-SCNC: 0.3 MMOL/L
BASE EXCESS BLDA CALC-SCNC: 0.5 MMOL/L
BLD GP AB SCN SERPL QL: NORMAL
BLOOD BANK CMNT PATIENT-IMP: NORMAL
CA-I BLD-MCNC: 4.5 MG/DL (ref 4.4–5.2)
CA-I BLD-MCNC: 4.5 MG/DL (ref 4.4–5.2)
GLUCOSE BLD-MCNC: 192 MG/DL (ref 70–99)
GLUCOSE BLD-MCNC: 202 MG/DL (ref 70–99)
GLUCOSE BLDC GLUCOMTR-MCNC: 123 MG/DL (ref 70–99)
HCO3 BLD-SCNC: 25 MMOL/L (ref 21–28)
HCO3 BLD-SCNC: 26 MMOL/L (ref 21–28)
HGB BLD-MCNC: 10.8 G/DL (ref 11.7–15.7)
HGB BLD-MCNC: 11.5 G/DL (ref 11.7–15.7)
HGB BLD-MCNC: 9.8 G/DL (ref 11.7–15.7)
O2/TOTAL GAS SETTING VFR VENT: 50 %
O2/TOTAL GAS SETTING VFR VENT: 50 %
PCO2 BLD: 41 MM HG (ref 35–45)
PCO2 BLD: 41 MM HG (ref 35–45)
PH BLD: 7.4 PH (ref 7.35–7.45)
PH BLD: 7.4 PH (ref 7.35–7.45)
PO2 BLD: 167 MM HG (ref 80–105)
PO2 BLD: 185 MM HG (ref 80–105)
POTASSIUM BLD-SCNC: 4.3 MMOL/L (ref 3.4–5.3)
POTASSIUM BLD-SCNC: 4.8 MMOL/L (ref 3.4–5.3)
SODIUM BLD-SCNC: 129 MMOL/L (ref 133–144)
SODIUM BLD-SCNC: 130 MMOL/L (ref 133–144)
SODIUM SERPL-SCNC: 134 MMOL/L (ref 133–144)
SPECIMEN EXP DATE BLD: NORMAL

## 2019-07-29 PROCEDURE — 99207 ZZC CDG-HISTORY COMP: MEETS EXP. PROBLEM FOCUSED - DOWN CODED LACK OF HPI: CPT | Performed by: HOSPITALIST

## 2019-07-29 PROCEDURE — 99232 SBSQ HOSP IP/OBS MODERATE 35: CPT | Performed by: HOSPITALIST

## 2019-07-29 PROCEDURE — 27810169 ZZH OR IMPLANT GENERAL: Performed by: ORTHOPAEDIC SURGERY

## 2019-07-29 PROCEDURE — 37000008 ZZH ANESTHESIA TECHNICAL FEE, 1ST 30 MIN: Performed by: ORTHOPAEDIC SURGERY

## 2019-07-29 PROCEDURE — 27211020 ZZHC OR CELL SAVER OPNP: Performed by: ORTHOPAEDIC SURGERY

## 2019-07-29 PROCEDURE — C1762 CONN TISS, HUMAN(INC FASCIA): HCPCS | Performed by: ORTHOPAEDIC SURGERY

## 2019-07-29 PROCEDURE — 25000132 ZZH RX MED GY IP 250 OP 250 PS 637: Performed by: PHYSICIAN ASSISTANT

## 2019-07-29 PROCEDURE — 40000277 XR SURGERY CARM FLUORO LESS THAN 5 MIN W STILLS

## 2019-07-29 PROCEDURE — 00000146 ZZHCL STATISTIC GLUCOSE BY METER IP

## 2019-07-29 PROCEDURE — 85018 HEMOGLOBIN: CPT | Performed by: HOSPITALIST

## 2019-07-29 PROCEDURE — 0SG00AJ FUSION OF LUMBAR VERTEBRAL JOINT WITH INTERBODY FUSION DEVICE, POSTERIOR APPROACH, ANTERIOR COLUMN, OPEN APPROACH: ICD-10-PCS | Performed by: ORTHOPAEDIC SURGERY

## 2019-07-29 PROCEDURE — 84132 ASSAY OF SERUM POTASSIUM: CPT | Performed by: ORTHOPAEDIC SURGERY

## 2019-07-29 PROCEDURE — 82330 ASSAY OF CALCIUM: CPT | Performed by: ORTHOPAEDIC SURGERY

## 2019-07-29 PROCEDURE — 40000014 ZZH STATISTIC ARTERIAL MONITORING DAILY

## 2019-07-29 PROCEDURE — 25000125 ZZHC RX 250: Performed by: ORTHOPAEDIC SURGERY

## 2019-07-29 PROCEDURE — 25000128 H RX IP 250 OP 636: Performed by: PHYSICIAN ASSISTANT

## 2019-07-29 PROCEDURE — 40000170 ZZH STATISTIC PRE-PROCEDURE ASSESSMENT II: Performed by: ORTHOPAEDIC SURGERY

## 2019-07-29 PROCEDURE — 25000128 H RX IP 250 OP 636: Performed by: NURSE ANESTHETIST, CERTIFIED REGISTERED

## 2019-07-29 PROCEDURE — 25000125 ZZHC RX 250: Performed by: NURSE ANESTHETIST, CERTIFIED REGISTERED

## 2019-07-29 PROCEDURE — 25800030 ZZH RX IP 258 OP 636: Performed by: NURSE ANESTHETIST, CERTIFIED REGISTERED

## 2019-07-29 PROCEDURE — 37000009 ZZH ANESTHESIA TECHNICAL FEE, EACH ADDTL 15 MIN: Performed by: ORTHOPAEDIC SURGERY

## 2019-07-29 PROCEDURE — 27211024 ZZHC OR SUPPLY OTHER OPNP: Performed by: ORTHOPAEDIC SURGERY

## 2019-07-29 PROCEDURE — 0QH304Z INSERTION OF INTERNAL FIXATION DEVICE INTO LEFT PELVIC BONE, OPEN APPROACH: ICD-10-PCS | Performed by: ORTHOPAEDIC SURGERY

## 2019-07-29 PROCEDURE — 25000301 ZZH OR RX SURGIFLO W/THROMBIN KIT 2ML 1991 OPNP: Performed by: ORTHOPAEDIC SURGERY

## 2019-07-29 PROCEDURE — 40000278 XR SURGERY CARM FLUORO LESS THAN 5 MIN: Mod: TC

## 2019-07-29 PROCEDURE — L8699 PROSTHETIC IMPLANT NOS: HCPCS | Performed by: ORTHOPAEDIC SURGERY

## 2019-07-29 PROCEDURE — C1713 ANCHOR/SCREW BN/BN,TIS/BN: HCPCS | Performed by: ORTHOPAEDIC SURGERY

## 2019-07-29 PROCEDURE — P9041 ALBUMIN (HUMAN),5%, 50ML: HCPCS | Performed by: NURSE ANESTHETIST, CERTIFIED REGISTERED

## 2019-07-29 PROCEDURE — 84295 ASSAY OF SERUM SODIUM: CPT | Performed by: ORTHOPAEDIC SURGERY

## 2019-07-29 PROCEDURE — 25800030 ZZH RX IP 258 OP 636

## 2019-07-29 PROCEDURE — 82803 BLOOD GASES ANY COMBINATION: CPT | Performed by: ORTHOPAEDIC SURGERY

## 2019-07-29 PROCEDURE — 25000125 ZZHC RX 250: Performed by: ANESTHESIOLOGY

## 2019-07-29 PROCEDURE — 36415 COLL VENOUS BLD VENIPUNCTURE: CPT | Performed by: HOSPITALIST

## 2019-07-29 PROCEDURE — 25000128 H RX IP 250 OP 636: Performed by: ANESTHESIOLOGY

## 2019-07-29 PROCEDURE — 40000275 ZZH STATISTIC RCP TIME EA 10 MIN

## 2019-07-29 PROCEDURE — 25800030 ZZH RX IP 258 OP 636: Performed by: HOSPITALIST

## 2019-07-29 PROCEDURE — 25000565 ZZH ISOFLURANE, EA 15 MIN: Performed by: ORTHOPAEDIC SURGERY

## 2019-07-29 PROCEDURE — 0SG1071 FUSION OF 2 OR MORE LUMBAR VERTEBRAL JOINTS WITH AUTOLOGOUS TISSUE SUBSTITUTE, POSTERIOR APPROACH, POSTERIOR COLUMN, OPEN APPROACH: ICD-10-PCS | Performed by: ORTHOPAEDIC SURGERY

## 2019-07-29 PROCEDURE — 25000128 H RX IP 250 OP 636: Performed by: ORTHOPAEDIC SURGERY

## 2019-07-29 PROCEDURE — 71000015 ZZH RECOVERY PHASE 1 LEVEL 2 EA ADDTL HR: Performed by: ORTHOPAEDIC SURGERY

## 2019-07-29 PROCEDURE — 0ST40ZZ RESECTION OF LUMBOSACRAL DISC, OPEN APPROACH: ICD-10-PCS | Performed by: SURGERY

## 2019-07-29 PROCEDURE — 82947 ASSAY GLUCOSE BLOOD QUANT: CPT | Performed by: ORTHOPAEDIC SURGERY

## 2019-07-29 PROCEDURE — 8E0WXBF COMPUTER ASSISTED PROCEDURE OF TRUNK REGION, WITH FLUOROSCOPY: ICD-10-PCS | Performed by: ORTHOPAEDIC SURGERY

## 2019-07-29 PROCEDURE — 12000001 ZZH R&B MED SURG/OB UMMC

## 2019-07-29 PROCEDURE — 27210794 ZZH OR GENERAL SUPPLY STERILE: Performed by: ORTHOPAEDIC SURGERY

## 2019-07-29 PROCEDURE — 27210995 ZZH RX 272: Performed by: ORTHOPAEDIC SURGERY

## 2019-07-29 PROCEDURE — 36000076 ZZH SURGERY LEVEL 6 EA 15 ADDTL MIN - UMMC: Performed by: ORTHOPAEDIC SURGERY

## 2019-07-29 PROCEDURE — 0ST20ZZ RESECTION OF LUMBAR VERTEBRAL DISC, OPEN APPROACH: ICD-10-PCS | Performed by: SURGERY

## 2019-07-29 PROCEDURE — 36000078 ZZH SURGERY LEVEL 6 W FLUORO 1ST 30 MIN - UMMC: Performed by: ORTHOPAEDIC SURGERY

## 2019-07-29 PROCEDURE — 84295 ASSAY OF SERUM SODIUM: CPT | Performed by: HOSPITALIST

## 2019-07-29 PROCEDURE — 0SG00A0 FUSION OF LUMBAR VERTEBRAL JOINT WITH INTERBODY FUSION DEVICE, ANTERIOR APPROACH, ANTERIOR COLUMN, OPEN APPROACH: ICD-10-PCS | Performed by: ORTHOPAEDIC SURGERY

## 2019-07-29 PROCEDURE — 71000014 ZZH RECOVERY PHASE 1 LEVEL 2 FIRST HR: Performed by: ORTHOPAEDIC SURGERY

## 2019-07-29 PROCEDURE — 25000128 H RX IP 250 OP 636: Performed by: HOSPITALIST

## 2019-07-29 PROCEDURE — 0QH204Z INSERTION OF INTERNAL FIXATION DEVICE INTO RIGHT PELVIC BONE, OPEN APPROACH: ICD-10-PCS | Performed by: ORTHOPAEDIC SURGERY

## 2019-07-29 PROCEDURE — 0SG3071 FUSION OF LUMBOSACRAL JOINT WITH AUTOLOGOUS TISSUE SUBSTITUTE, POSTERIOR APPROACH, POSTERIOR COLUMN, OPEN APPROACH: ICD-10-PCS | Performed by: ORTHOPAEDIC SURGERY

## 2019-07-29 PROCEDURE — 25800030 ZZH RX IP 258 OP 636: Performed by: ORTHOPAEDIC SURGERY

## 2019-07-29 PROCEDURE — 25800030 ZZH RX IP 258 OP 636: Performed by: ANESTHESIOLOGY

## 2019-07-29 DEVICE — BONE CEMENT KYPHX HV-R C01A: Type: IMPLANTABLE DEVICE | Site: SPINE LUMBAR | Status: FUNCTIONAL

## 2019-07-29 DEVICE — GRAFT BONE INFUSE BMP MED 7510400: Type: IMPLANTABLE DEVICE | Site: SPINE LUMBAR | Status: FUNCTIONAL

## 2019-07-29 DEVICE — VERTEBROPLASTY CEMENT W/BONE MIXER C01B: Type: IMPLANTABLE DEVICE | Status: FUNCTIONAL

## 2019-07-29 DEVICE — GRAFT BONE MAGNIFUSE 1CMX10CM 7509211: Type: IMPLANTABLE DEVICE | Site: SPINE LUMBAR | Status: FUNCTIONAL

## 2019-07-29 DEVICE — GRAFT BONE CRUSH CANC 15ML 400075: Type: IMPLANTABLE DEVICE | Site: SPINE LUMBAR | Status: FUNCTIONAL

## 2019-07-29 RX ORDER — CEFAZOLIN SODIUM 1 G/3ML
1 INJECTION, POWDER, FOR SOLUTION INTRAMUSCULAR; INTRAVENOUS SEE ADMIN INSTRUCTIONS
Status: DISCONTINUED | OUTPATIENT
Start: 2019-07-29 | End: 2019-07-29 | Stop reason: HOSPADM

## 2019-07-29 RX ORDER — POLYETHYLENE GLYCOL 3350 17 G/17G
17 POWDER, FOR SOLUTION ORAL DAILY
Status: DISCONTINUED | OUTPATIENT
Start: 2019-07-30 | End: 2019-07-30

## 2019-07-29 RX ORDER — ESMOLOL HYDROCHLORIDE 10 MG/ML
INJECTION INTRAVENOUS PRN
Status: DISCONTINUED | OUTPATIENT
Start: 2019-07-29 | End: 2019-07-29

## 2019-07-29 RX ORDER — CEFAZOLIN SODIUM 2 G/100ML
2 INJECTION, SOLUTION INTRAVENOUS
Status: COMPLETED | OUTPATIENT
Start: 2019-07-29 | End: 2019-07-29

## 2019-07-29 RX ORDER — SODIUM CHLORIDE 9 MG/ML
INJECTION, SOLUTION INTRAVENOUS
Status: COMPLETED
Start: 2019-07-29 | End: 2019-07-29

## 2019-07-29 RX ORDER — BUPIVACAINE HYDROCHLORIDE AND EPINEPHRINE 2.5; 5 MG/ML; UG/ML
INJECTION, SOLUTION INFILTRATION; PERINEURAL PRN
Status: DISCONTINUED | OUTPATIENT
Start: 2019-07-29 | End: 2019-07-29 | Stop reason: HOSPADM

## 2019-07-29 RX ORDER — PROCHLORPERAZINE MALEATE 5 MG
10 TABLET ORAL EVERY 6 HOURS PRN
Status: DISCONTINUED | OUTPATIENT
Start: 2019-07-29 | End: 2019-08-06 | Stop reason: HOSPADM

## 2019-07-29 RX ORDER — LIDOCAINE 40 MG/G
CREAM TOPICAL
Status: DISCONTINUED | OUTPATIENT
Start: 2019-07-29 | End: 2019-07-29 | Stop reason: HOSPADM

## 2019-07-29 RX ORDER — SODIUM CHLORIDE, SODIUM LACTATE, POTASSIUM CHLORIDE, CALCIUM CHLORIDE 600; 310; 30; 20 MG/100ML; MG/100ML; MG/100ML; MG/100ML
INJECTION, SOLUTION INTRAVENOUS CONTINUOUS
Status: DISCONTINUED | OUTPATIENT
Start: 2019-07-29 | End: 2019-07-29 | Stop reason: HOSPADM

## 2019-07-29 RX ORDER — LIDOCAINE HYDROCHLORIDE 20 MG/ML
INJECTION, SOLUTION INFILTRATION; PERINEURAL PRN
Status: DISCONTINUED | OUTPATIENT
Start: 2019-07-29 | End: 2019-07-29

## 2019-07-29 RX ORDER — MAGNESIUM HYDROXIDE 1200 MG/15ML
LIQUID ORAL PRN
Status: DISCONTINUED | OUTPATIENT
Start: 2019-07-29 | End: 2019-07-29 | Stop reason: HOSPADM

## 2019-07-29 RX ORDER — ALBUMIN, HUMAN INJ 5% 5 %
SOLUTION INTRAVENOUS CONTINUOUS PRN
Status: DISCONTINUED | OUTPATIENT
Start: 2019-07-29 | End: 2019-07-29

## 2019-07-29 RX ORDER — GABAPENTIN 300 MG/1
300 CAPSULE ORAL
Status: DISCONTINUED | OUTPATIENT
Start: 2019-07-29 | End: 2019-07-29

## 2019-07-29 RX ORDER — ACETAMINOPHEN 325 MG/1
975 TABLET ORAL ONCE
Status: COMPLETED | OUTPATIENT
Start: 2019-07-29 | End: 2019-07-29

## 2019-07-29 RX ORDER — VANCOMYCIN HYDROCHLORIDE 1 G/20ML
INJECTION, POWDER, LYOPHILIZED, FOR SOLUTION INTRAVENOUS PRN
Status: DISCONTINUED | OUTPATIENT
Start: 2019-07-29 | End: 2019-07-29 | Stop reason: HOSPADM

## 2019-07-29 RX ORDER — BISACODYL 10 MG
10 SUPPOSITORY, RECTAL RECTAL DAILY PRN
Status: DISCONTINUED | OUTPATIENT
Start: 2019-07-29 | End: 2019-08-06 | Stop reason: HOSPADM

## 2019-07-29 RX ORDER — ACETAMINOPHEN 325 MG/1
975 TABLET ORAL ONCE
Status: DISCONTINUED | OUTPATIENT
Start: 2019-07-29 | End: 2019-07-29

## 2019-07-29 RX ORDER — SODIUM CHLORIDE 9 MG/ML
INJECTION, SOLUTION INTRAVENOUS CONTINUOUS
Status: DISCONTINUED | OUTPATIENT
Start: 2019-07-29 | End: 2019-07-30

## 2019-07-29 RX ORDER — SODIUM CHLORIDE, SODIUM LACTATE, POTASSIUM CHLORIDE, CALCIUM CHLORIDE 600; 310; 30; 20 MG/100ML; MG/100ML; MG/100ML; MG/100ML
INJECTION, SOLUTION INTRAVENOUS CONTINUOUS PRN
Status: DISCONTINUED | OUTPATIENT
Start: 2019-07-29 | End: 2019-07-29

## 2019-07-29 RX ORDER — KETOROLAC TROMETHAMINE 30 MG/ML
INJECTION, SOLUTION INTRAMUSCULAR; INTRAVENOUS PRN
Status: DISCONTINUED | OUTPATIENT
Start: 2019-07-29 | End: 2019-07-29

## 2019-07-29 RX ORDER — FENTANYL CITRATE 50 UG/ML
25-50 INJECTION, SOLUTION INTRAMUSCULAR; INTRAVENOUS
Status: DISCONTINUED | OUTPATIENT
Start: 2019-07-29 | End: 2019-07-29 | Stop reason: HOSPADM

## 2019-07-29 RX ORDER — GLYCOPYRROLATE 0.2 MG/ML
INJECTION, SOLUTION INTRAMUSCULAR; INTRAVENOUS PRN
Status: DISCONTINUED | OUTPATIENT
Start: 2019-07-29 | End: 2019-07-29

## 2019-07-29 RX ORDER — SCOLOPAMINE TRANSDERMAL SYSTEM 1 MG/1
1 PATCH, EXTENDED RELEASE TRANSDERMAL ONCE
Status: COMPLETED | OUTPATIENT
Start: 2019-07-29 | End: 2019-07-29

## 2019-07-29 RX ORDER — ACETAMINOPHEN 325 MG/1
975 TABLET ORAL EVERY 8 HOURS
Status: DISCONTINUED | OUTPATIENT
Start: 2019-07-29 | End: 2019-07-31

## 2019-07-29 RX ORDER — DEXTROSE MONOHYDRATE, SODIUM CHLORIDE, AND POTASSIUM CHLORIDE 50; 1.49; 4.5 G/1000ML; G/1000ML; G/1000ML
INJECTION, SOLUTION INTRAVENOUS CONTINUOUS
Status: DISCONTINUED | OUTPATIENT
Start: 2019-07-29 | End: 2019-07-29

## 2019-07-29 RX ORDER — ONDANSETRON 2 MG/ML
4 INJECTION INTRAMUSCULAR; INTRAVENOUS EVERY 30 MIN PRN
Status: DISCONTINUED | OUTPATIENT
Start: 2019-07-29 | End: 2019-07-29 | Stop reason: HOSPADM

## 2019-07-29 RX ORDER — MUPIROCIN 20 MG/G
OINTMENT TOPICAL
Status: ON HOLD | COMMUNITY
Start: 2019-07-12 | End: 2019-08-01

## 2019-07-29 RX ORDER — FENTANYL CITRATE 50 UG/ML
INJECTION, SOLUTION INTRAMUSCULAR; INTRAVENOUS PRN
Status: DISCONTINUED | OUTPATIENT
Start: 2019-07-29 | End: 2019-07-29

## 2019-07-29 RX ORDER — POLYETHYLENE GLYCOL 3350 17 G/17G
17 POWDER, FOR SOLUTION ORAL DAILY
Status: DISCONTINUED | OUTPATIENT
Start: 2019-07-29 | End: 2019-07-29

## 2019-07-29 RX ORDER — DOCUSATE SODIUM 100 MG/1
200 CAPSULE, LIQUID FILLED ORAL 2 TIMES DAILY
Status: DISCONTINUED | OUTPATIENT
Start: 2019-07-29 | End: 2019-08-06 | Stop reason: HOSPADM

## 2019-07-29 RX ORDER — CYCLOBENZAPRINE HCL 5 MG
10 TABLET ORAL 3 TIMES DAILY PRN
Status: DISCONTINUED | OUTPATIENT
Start: 2019-07-29 | End: 2019-08-06 | Stop reason: HOSPADM

## 2019-07-29 RX ORDER — METOCLOPRAMIDE HYDROCHLORIDE 5 MG/ML
10 INJECTION INTRAMUSCULAR; INTRAVENOUS EVERY 6 HOURS PRN
Status: DISCONTINUED | OUTPATIENT
Start: 2019-07-29 | End: 2019-08-06 | Stop reason: HOSPADM

## 2019-07-29 RX ORDER — ACETAMINOPHEN 325 MG/1
650 TABLET ORAL EVERY 4 HOURS PRN
Status: DISCONTINUED | OUTPATIENT
Start: 2019-08-01 | End: 2019-08-06 | Stop reason: HOSPADM

## 2019-07-29 RX ORDER — HYDROXYZINE HYDROCHLORIDE 25 MG/1
25 TABLET, FILM COATED ORAL EVERY 6 HOURS PRN
Status: DISCONTINUED | OUTPATIENT
Start: 2019-07-29 | End: 2019-08-06 | Stop reason: HOSPADM

## 2019-07-29 RX ORDER — GABAPENTIN 300 MG/1
300 CAPSULE ORAL ONCE
Status: DISCONTINUED | OUTPATIENT
Start: 2019-07-29 | End: 2019-07-29 | Stop reason: HOSPADM

## 2019-07-29 RX ORDER — ONDANSETRON 4 MG/1
4 TABLET, ORALLY DISINTEGRATING ORAL EVERY 6 HOURS PRN
Status: DISCONTINUED | OUTPATIENT
Start: 2019-07-29 | End: 2019-08-06 | Stop reason: HOSPADM

## 2019-07-29 RX ORDER — METOCLOPRAMIDE 5 MG/1
10 TABLET ORAL EVERY 6 HOURS PRN
Status: DISCONTINUED | OUTPATIENT
Start: 2019-07-29 | End: 2019-08-06 | Stop reason: HOSPADM

## 2019-07-29 RX ORDER — NALOXONE HYDROCHLORIDE 0.4 MG/ML
.1-.4 INJECTION, SOLUTION INTRAMUSCULAR; INTRAVENOUS; SUBCUTANEOUS
Status: DISCONTINUED | OUTPATIENT
Start: 2019-07-29 | End: 2019-07-29

## 2019-07-29 RX ORDER — ONDANSETRON 4 MG/1
4 TABLET, ORALLY DISINTEGRATING ORAL EVERY 30 MIN PRN
Status: DISCONTINUED | OUTPATIENT
Start: 2019-07-29 | End: 2019-07-29 | Stop reason: HOSPADM

## 2019-07-29 RX ORDER — LIDOCAINE 40 MG/G
CREAM TOPICAL
Status: DISCONTINUED | OUTPATIENT
Start: 2019-07-29 | End: 2019-08-06 | Stop reason: HOSPADM

## 2019-07-29 RX ORDER — HYDROMORPHONE HYDROCHLORIDE 1 MG/ML
.3-.5 INJECTION, SOLUTION INTRAMUSCULAR; INTRAVENOUS; SUBCUTANEOUS EVERY 5 MIN PRN
Status: DISCONTINUED | OUTPATIENT
Start: 2019-07-29 | End: 2019-07-29 | Stop reason: HOSPADM

## 2019-07-29 RX ORDER — CALCIUM CARBONATE 500 MG/1
1000 TABLET, CHEWABLE ORAL 4 TIMES DAILY PRN
Status: DISCONTINUED | OUTPATIENT
Start: 2019-07-29 | End: 2019-08-06 | Stop reason: HOSPADM

## 2019-07-29 RX ORDER — HYDROMORPHONE HYDROCHLORIDE 2 MG/1
2-4 TABLET ORAL
Status: DISCONTINUED | OUTPATIENT
Start: 2019-07-29 | End: 2019-08-01 | Stop reason: DRUGHIGH

## 2019-07-29 RX ORDER — PROPOFOL 10 MG/ML
INJECTION, EMULSION INTRAVENOUS CONTINUOUS PRN
Status: DISCONTINUED | OUTPATIENT
Start: 2019-07-29 | End: 2019-07-29

## 2019-07-29 RX ORDER — ONDANSETRON 2 MG/ML
INJECTION INTRAMUSCULAR; INTRAVENOUS PRN
Status: DISCONTINUED | OUTPATIENT
Start: 2019-07-29 | End: 2019-07-29

## 2019-07-29 RX ORDER — GABAPENTIN 100 MG/1
300 CAPSULE ORAL 2 TIMES DAILY
Status: DISCONTINUED | OUTPATIENT
Start: 2019-07-29 | End: 2019-07-31

## 2019-07-29 RX ORDER — ONDANSETRON 2 MG/ML
4 INJECTION INTRAMUSCULAR; INTRAVENOUS EVERY 6 HOURS PRN
Status: DISCONTINUED | OUTPATIENT
Start: 2019-07-29 | End: 2019-08-06 | Stop reason: HOSPADM

## 2019-07-29 RX ORDER — DEXAMETHASONE SODIUM PHOSPHATE 4 MG/ML
INJECTION, SOLUTION INTRA-ARTICULAR; INTRALESIONAL; INTRAMUSCULAR; INTRAVENOUS; SOFT TISSUE PRN
Status: DISCONTINUED | OUTPATIENT
Start: 2019-07-29 | End: 2019-07-29

## 2019-07-29 RX ORDER — PROPOFOL 10 MG/ML
INJECTION, EMULSION INTRAVENOUS PRN
Status: DISCONTINUED | OUTPATIENT
Start: 2019-07-29 | End: 2019-07-29

## 2019-07-29 RX ORDER — NALOXONE HYDROCHLORIDE 0.4 MG/ML
.1-.4 INJECTION, SOLUTION INTRAMUSCULAR; INTRAVENOUS; SUBCUTANEOUS
Status: ACTIVE | OUTPATIENT
Start: 2019-07-29 | End: 2019-07-30

## 2019-07-29 RX ORDER — AMOXICILLIN 250 MG
2 CAPSULE ORAL 2 TIMES DAILY
Status: DISCONTINUED | OUTPATIENT
Start: 2019-07-29 | End: 2019-08-06 | Stop reason: HOSPADM

## 2019-07-29 RX ORDER — CEFAZOLIN SODIUM 2 G/100ML
2 INJECTION, SOLUTION INTRAVENOUS EVERY 8 HOURS
Status: COMPLETED | OUTPATIENT
Start: 2019-07-29 | End: 2019-07-30

## 2019-07-29 RX ORDER — CEFAZOLIN SODIUM 1 G/3ML
INJECTION, POWDER, FOR SOLUTION INTRAMUSCULAR; INTRAVENOUS PRN
Status: DISCONTINUED | OUTPATIENT
Start: 2019-07-29 | End: 2019-07-29

## 2019-07-29 RX ORDER — AMOXICILLIN 250 MG
1 CAPSULE ORAL 2 TIMES DAILY
Status: DISCONTINUED | OUTPATIENT
Start: 2019-07-29 | End: 2019-08-06 | Stop reason: HOSPADM

## 2019-07-29 RX ADMIN — Medication: at 17:47

## 2019-07-29 RX ADMIN — ROCURONIUM BROMIDE 40 MG: 10 INJECTION INTRAVENOUS at 08:55

## 2019-07-29 RX ADMIN — SODIUM CHLORIDE 500 ML: 9 INJECTION, SOLUTION INTRAVENOUS at 20:41

## 2019-07-29 RX ADMIN — ROCURONIUM BROMIDE 30 MG: 10 INJECTION INTRAVENOUS at 09:40

## 2019-07-29 RX ADMIN — ONDANSETRON 4 MG: 2 INJECTION INTRAMUSCULAR; INTRAVENOUS at 15:46

## 2019-07-29 RX ADMIN — PHENYLEPHRINE HYDROCHLORIDE 100 MCG: 10 INJECTION INTRAVENOUS at 12:04

## 2019-07-29 RX ADMIN — ROCURONIUM BROMIDE 40 MG: 10 INJECTION INTRAVENOUS at 14:42

## 2019-07-29 RX ADMIN — ESMOLOL HYDROCHLORIDE 50 MG: 10 INJECTION, SOLUTION INTRAVENOUS at 07:06

## 2019-07-29 RX ADMIN — TRANEXAMIC ACID 2690 MG: 100 INJECTION, SOLUTION INTRAVENOUS at 07:28

## 2019-07-29 RX ADMIN — ROCURONIUM BROMIDE 30 MG: 10 INJECTION INTRAVENOUS at 11:03

## 2019-07-29 RX ADMIN — FENTANYL CITRATE 50 MCG: 50 INJECTION, SOLUTION INTRAMUSCULAR; INTRAVENOUS at 07:57

## 2019-07-29 RX ADMIN — GLYCOPYRROLATE 0.2 MG: 0.2 INJECTION, SOLUTION INTRAMUSCULAR; INTRAVENOUS at 11:48

## 2019-07-29 RX ADMIN — ROCURONIUM BROMIDE 30 MG: 10 INJECTION INTRAVENOUS at 12:22

## 2019-07-29 RX ADMIN — SODIUM CHLORIDE, POTASSIUM CHLORIDE, SODIUM LACTATE AND CALCIUM CHLORIDE: 600; 310; 30; 20 INJECTION, SOLUTION INTRAVENOUS at 11:17

## 2019-07-29 RX ADMIN — FENTANYL CITRATE 50 MCG: 50 INJECTION, SOLUTION INTRAMUSCULAR; INTRAVENOUS at 15:27

## 2019-07-29 RX ADMIN — GABAPENTIN 300 MG: 100 CAPSULE ORAL at 19:33

## 2019-07-29 RX ADMIN — ACETAMINOPHEN 975 MG: 325 TABLET, FILM COATED ORAL at 06:29

## 2019-07-29 RX ADMIN — FENTANYL CITRATE 25 MCG: 50 INJECTION INTRAMUSCULAR; INTRAVENOUS at 17:08

## 2019-07-29 RX ADMIN — FENTANYL CITRATE 50 MCG: 50 INJECTION, SOLUTION INTRAMUSCULAR; INTRAVENOUS at 08:25

## 2019-07-29 RX ADMIN — ROCURONIUM BROMIDE 20 MG: 10 INJECTION INTRAVENOUS at 08:21

## 2019-07-29 RX ADMIN — PHENYLEPHRINE HYDROCHLORIDE 100 MCG: 10 INJECTION INTRAVENOUS at 11:31

## 2019-07-29 RX ADMIN — ROCURONIUM BROMIDE 30 MG: 10 INJECTION INTRAVENOUS at 13:03

## 2019-07-29 RX ADMIN — SODIUM CHLORIDE, POTASSIUM CHLORIDE, SODIUM LACTATE AND CALCIUM CHLORIDE: 600; 310; 30; 20 INJECTION, SOLUTION INTRAVENOUS at 06:54

## 2019-07-29 RX ADMIN — ACETAMINOPHEN 975 MG: 325 TABLET, FILM COATED ORAL at 18:29

## 2019-07-29 RX ADMIN — ROCURONIUM BROMIDE 30 MG: 10 INJECTION INTRAVENOUS at 11:36

## 2019-07-29 RX ADMIN — FENTANYL CITRATE 25 MCG: 50 INJECTION, SOLUTION INTRAMUSCULAR; INTRAVENOUS at 08:17

## 2019-07-29 RX ADMIN — ROCURONIUM BROMIDE 20 MG: 10 INJECTION INTRAVENOUS at 13:37

## 2019-07-29 RX ADMIN — PHENYLEPHRINE HYDROCHLORIDE 100 MCG: 10 INJECTION INTRAVENOUS at 12:32

## 2019-07-29 RX ADMIN — ROCURONIUM BROMIDE 20 MG: 10 INJECTION INTRAVENOUS at 10:15

## 2019-07-29 RX ADMIN — DEXAMETHASONE SODIUM PHOSPHATE 8 MG: 4 INJECTION, SOLUTION INTRAMUSCULAR; INTRAVENOUS at 07:53

## 2019-07-29 RX ADMIN — PHENYLEPHRINE HYDROCHLORIDE 100 MCG: 10 INJECTION INTRAVENOUS at 12:22

## 2019-07-29 RX ADMIN — TRANEXAMIC ACID 10 MG/KG/HR: 100 INJECTION, SOLUTION INTRAVENOUS at 07:47

## 2019-07-29 RX ADMIN — SENNOSIDES AND DOCUSATE SODIUM 1 TABLET: 8.6; 5 TABLET ORAL at 19:33

## 2019-07-29 RX ADMIN — PHENYLEPHRINE HYDROCHLORIDE 0.3 MCG/KG/MIN: 10 INJECTION INTRAVENOUS at 12:52

## 2019-07-29 RX ADMIN — SODIUM CHLORIDE, POTASSIUM CHLORIDE, SODIUM LACTATE AND CALCIUM CHLORIDE: 600; 310; 30; 20 INJECTION, SOLUTION INTRAVENOUS at 12:57

## 2019-07-29 RX ADMIN — CEFAZOLIN 1 G: 1 INJECTION, POWDER, FOR SOLUTION INTRAMUSCULAR; INTRAVENOUS at 13:45

## 2019-07-29 RX ADMIN — PHENYLEPHRINE HYDROCHLORIDE 100 MCG: 10 INJECTION INTRAVENOUS at 11:42

## 2019-07-29 RX ADMIN — ROCURONIUM BROMIDE 20 MG: 10 INJECTION INTRAVENOUS at 07:56

## 2019-07-29 RX ADMIN — MIDAZOLAM 2 MG: 1 INJECTION INTRAMUSCULAR; INTRAVENOUS at 06:54

## 2019-07-29 RX ADMIN — FENTANYL CITRATE 25 MCG: 50 INJECTION, SOLUTION INTRAMUSCULAR; INTRAVENOUS at 08:20

## 2019-07-29 RX ADMIN — FENTANYL CITRATE 25 MCG: 50 INJECTION INTRAMUSCULAR; INTRAVENOUS at 17:35

## 2019-07-29 RX ADMIN — PHENYLEPHRINE HYDROCHLORIDE 100 MCG: 10 INJECTION INTRAVENOUS at 12:42

## 2019-07-29 RX ADMIN — CEFAZOLIN SODIUM 1 G: 2 INJECTION, SOLUTION INTRAVENOUS at 11:48

## 2019-07-29 RX ADMIN — ROCURONIUM BROMIDE 70 MG: 10 INJECTION INTRAVENOUS at 07:04

## 2019-07-29 RX ADMIN — CEFAZOLIN 1 G: 1 INJECTION, POWDER, FOR SOLUTION INTRAMUSCULAR; INTRAVENOUS at 15:45

## 2019-07-29 RX ADMIN — SCOPALAMINE 1 PATCH: 1 PATCH, EXTENDED RELEASE TRANSDERMAL at 06:46

## 2019-07-29 RX ADMIN — PHENYLEPHRINE HYDROCHLORIDE 200 MCG: 10 INJECTION INTRAVENOUS at 12:53

## 2019-07-29 RX ADMIN — CEFAZOLIN SODIUM 2 G: 2 INJECTION, SOLUTION INTRAVENOUS at 07:41

## 2019-07-29 RX ADMIN — SUGAMMADEX 180 MG: 100 INJECTION, SOLUTION INTRAVENOUS at 16:19

## 2019-07-29 RX ADMIN — CEFAZOLIN 1 G: 1 INJECTION, POWDER, FOR SOLUTION INTRAMUSCULAR; INTRAVENOUS at 09:41

## 2019-07-29 RX ADMIN — SODIUM CHLORIDE 1000 ML: 9 INJECTION, SOLUTION INTRAVENOUS at 19:39

## 2019-07-29 RX ADMIN — SUFENTANIL CITRATE 0.1 MCG/KG/HR: 50 INJECTION EPIDURAL; INTRAVENOUS at 07:25

## 2019-07-29 RX ADMIN — ROCURONIUM BROMIDE 20 MG: 10 INJECTION INTRAVENOUS at 14:25

## 2019-07-29 RX ADMIN — PROPOFOL 200 MG: 10 INJECTION, EMULSION INTRAVENOUS at 07:04

## 2019-07-29 RX ADMIN — ALBUMIN HUMAN: 0.05 INJECTION, SOLUTION INTRAVENOUS at 07:47

## 2019-07-29 RX ADMIN — KETOROLAC TROMETHAMINE 30 MG: 30 INJECTION, SOLUTION INTRAMUSCULAR at 15:50

## 2019-07-29 RX ADMIN — DOCUSATE SODIUM 200 MG: 100 CAPSULE, LIQUID FILLED ORAL at 19:33

## 2019-07-29 RX ADMIN — SODIUM CHLORIDE 500 ML: 9 INJECTION, SOLUTION INTRAVENOUS at 22:01

## 2019-07-29 RX ADMIN — LIDOCAINE HYDROCHLORIDE 60 MG: 20 INJECTION, SOLUTION INFILTRATION; PERINEURAL at 07:04

## 2019-07-29 RX ADMIN — PROPOFOL 25 MCG/KG/MIN: 10 INJECTION, EMULSION INTRAVENOUS at 08:32

## 2019-07-29 RX ADMIN — SODIUM CHLORIDE: 9 INJECTION, SOLUTION INTRAVENOUS at 21:42

## 2019-07-29 RX ADMIN — RANITIDINE 150 MG: 150 TABLET ORAL at 19:33

## 2019-07-29 ASSESSMENT — MIFFLIN-ST. JEOR: SCORE: 1545.5

## 2019-07-29 ASSESSMENT — PAIN DESCRIPTION - DESCRIPTORS: DESCRIPTORS: ACHING

## 2019-07-29 ASSESSMENT — ACTIVITIES OF DAILY LIVING (ADL): ADLS_ACUITY_SCORE: 12

## 2019-07-29 NOTE — ANESTHESIA CARE TRANSFER NOTE
Patient: Yun Sagastume    Procedure(s):  (lateral) Anterior Lumbar Interbody Fusion Lumbar 4-5, Lumbar 5-Sacral 1: Use Of Infuse BMP (Medium Kit)  (prone) Steatlh Assisted Posterior Instrumented Spinal Fusion Lumbar 3-Pelvis, Transforaminal Lumbar Interbody Fusion L3-L4. (Right Facetectomy), Smith Carpio Osteotomy Lumbar 4-5, Lumbar 5-Sacral 1: Cement Augmentation Of Screws L3-L4    Diagnosis: Acquired Kyphosis, Lumbar Spondylosis, Lumbar Radicular Pain  Diagnosis Additional Information: No value filed.    Anesthesia Type:   General     Note:  Airway :Face Mask  Patient transferred to:PACU  Handoff Report: Identifed the Patient, Identified the Reponsible Provider, Reviewed the pertinent medical history, Discussed the surgical course, Reviewed Intra-OP anesthesia mangement and issues during anesthesia, Set expectations for post-procedure period and Allowed opportunity for questions and acknowledgement of understanding      Vitals: (Last set prior to Anesthesia Care Transfer)    CRNA VITALS  7/29/2019 1601 - 7/29/2019 1638      7/29/2019             Pulse:  109    ART BP:  130/68    ART Mean:  88    SpO2:  100 %    Resp Rate (observed):  5  (Abnormal)                 Electronically Signed By: BILLY Belcher CRNA  July 29, 2019  4:38 PM

## 2019-07-29 NOTE — OR NURSING
PACU to Inpatient Nursing Handoff    Patient Yun Sagastume is a 54 year old female who speaks English.   Procedure Procedure(s):  (lateral) Anterior Lumbar Interbody Fusion Lumbar 4-5, Lumbar 5-Sacral 1: Use Of Infuse BMP (Medium Kit)  (prone) Steatlh Assisted Posterior Instrumented Spinal Fusion Lumbar 3-Pelvis, Transforaminal Lumbar Interbody Fusion L3-L4. (Right Facetectomy), Smith Carpio Osteotomy Lumbar 4-5, Lumbar 5-Sacral 1: Cement Augmentation Of Screws L3-L4   Surgeon(s) Primary: Jeremiah Carrion MD  Assisting: Sara Renner PA-C; Becky Grant MD     Allergies   Allergen Reactions     Aspirin      Codeine Nausea     20 years ago     Nsaids      Amoxicillin Rash       Isolation  [unfilled]     Past Medical History   has a past medical history of Colon polyp, Depression, Diabetes (H), Gastric bypass status for obesity, HTN (hypertension), Osteopenia, and Post laminectomy syndrome.    Anesthesia General   Dermatome Level     Preop Meds acetaminophen (Tylenol) - time given: 0629  scopolamine patch   Nerve block Not applicable   Intraop Meds dexamethasone (Decadron)  fentanyl (Sublimaze): 200 mcg total  ketorolac (Toradol): last given at 1550  ondansetron (Zofran): last given at 1546   Local Meds No   Antibiotics cefazolin (Ancef) - last given at 1545     Pain Patient Currently in Pain: yes  Comfort: tolerable with discomfort  Pain Control: partially effective   PACU meds  fentanyl (Sublimaze): 50 mcg (total dose) last given at 1735    PCA / epidural Yes. PCA - hydromorphone (Dilaudid)   Capnography     Telemetry ECG Rhythm: Normal sinus rhythm   Inpatient Telemetry Monitor Ordered? Yes        Labs Glucose Lab Results   Component Value Date     07/29/2019       Hgb Lab Results   Component Value Date    HGB 11.5 07/29/2019       INR No results found for: INR   PACU Imaging Not applicable     Wound/Incision Incision/Surgical Site 08/30/17 Bilateral Lumbar spine (Active)   Number of days:  698       Incision/Surgical Site 07/29/19 Left Abdomen (Active)   Incision Assessment UTV 7/29/2019  5:27 PM   Closure HARI 7/29/2019  5:27 PM   Dressing Intervention Clean, dry, intact 7/29/2019  5:27 PM   Number of days: 0       Incision/Surgical Site 07/29/19 Back (Active)   Incision Assessment [facility] 7/29/2019  5:27 PM   Irene-Incision Assessment [place] 7/29/2019  5:27 PM   Closure HARI 7/29/2019  5:27 PM   Dressing Intervention Clean, dry, intact 7/29/2019  5:27 PM   Number of days: 0      CMS        Equipment ice pack   Other LDA       IV Access Peripheral IV 08/17/17 Left Hand (Active)   Number of days: 711       Peripheral IV 07/29/19 Left Hand (Active)   Site Assessment [facility] 7/29/2019  5:30 PM   Line Status Infusing 7/29/2019  5:30 PM   Phlebitis Scale 0-->no symptoms 7/29/2019  5:30 PM   Infiltration Scale 0 7/29/2019  5:30 PM   Number of days: 0       Peripheral IV 07/29/19 Right Hand (Active)   Site Assessment [facility] 7/29/2019  5:30 PM   Line Status Saline locked 7/29/2019  5:30 PM   Phlebitis Scale 0-->no symptoms 7/29/2019  5:30 PM   Infiltration Scale 0 7/29/2019  5:30 PM   Number of days: 0       Arterial Line 07/29/19 Radial (Active)   Site Assessment [facility] 7/29/2019  5:30 PM   Line Status Pulsatile blood flow 7/29/2019  4:40 PM   Art Line Waveform Appropriate 7/29/2019  5:30 PM   Art Line Interventions Zeroed and calibrated 7/29/2019  4:40 PM   Color/Movement/Sensation Capillary refill less than 3 sec 7/29/2019  5:30 PM   Line Necessity Yes, meets criteria 7/29/2019  5:30 PM   Dressing Type Transparent 7/29/2019  5:30 PM   Dressing Status Clean, dry, intact 7/29/2019  5:30 PM   Number of days: 0      Blood Products Not applicable EBL 1325   mL   Intake/Output Date 07/29/19 0700 - 07/30/19 0659   Shift 5156-4575 1645-8003 4770-6534 24 Hour Total   INTAKE   I.V. 1000 1450  2450   Other 379 93  472   Colloid 500 250  750   Shift Total(mL/kg) 1879(20.83) 1793(19.88)  3672(40.71)   OUTPUT   Urine 300 100  400   Blood  1086 239  1325   Shift Total(mL/kg) 1386(15.37) 339(3.76)  1725(19.12)   Weight (kg) 90.2 90.2 90.2 90.2      Drains / Schuster Closed/Suction Drain 1 Right Back Accordion 10 [place] (Active)   Site Description UTV 7/29/2019  5:30 PM   Dressing Status Normal: Clean, Dry & Intact 7/29/2019  5:30 PM   Number of days: 0       Urethral Catheter Latex 16 fr (Active)   Collection Container Standard 7/29/2019  5:30 PM   Securement Method Securing device (Describe) 7/29/2019  5:30 PM   Number of days: 0      Time of void PreOp Void Prior to Procedure: 0630 (07/29/19 0655)    PostOp      Diapered? No   Bladder Scan     PO    ice chips     Vitals    B/P: 101/69  T: 97.9  F (36.6  C)    Temp src: Oral  P:  Pulse: 85 (07/29/19 1800)    Heart Rate: 82 (07/29/19 1800)     R: 24  O2:  SpO2: 97 %    O2 Device: Nasal cannula (07/29/19 1800)    Oxygen Delivery: 2 LPM (07/29/19 1800)         Family/support present Son, updated in waiting room   Patient belongings     Patient transported on bed   DC meds/scripts (obs/outpt) Not applicable   Inpatient Pain Meds Released? Yes       Special needs/considerations None   Tasks needing completion None       Loni Walter, RN  ASCOM [id number]

## 2019-07-29 NOTE — ANESTHESIA PROCEDURE NOTES
Arterial Line Procedure Note  Staff:     Anesthesiologist:  Rolo Sorto DO  Location: In OR After Induction  Procedure Start/Stop Times:     patient identified, IV checked, site marked, risks and benefits discussed, informed consent, monitors and equipment checked, pre-op evaluation and at physician/surgeon's request      Correct Patient: Yes      Correct Position: Yes      Correct Site: Yes      Correct Procedure: Yes      Correct Laterality:  Yes    Site Marked:  Yes  Line Placement:     Procedure:  Arterial Line    Insertion Site:  Radial    Insertion laterality:  Left    Skin Prep: Alcohol      Patient Prep: mask, sterile gloves and hat      Local skin infiltration:  None    Ultrasound Guided?: No      Catheter size:  20 gauge, 12 cm    Cath secured with: other (comment)      Dressing:  Tegaderm    Complications:  None obvious    Arterial waveform: Yes      IBP within 10% of NIBP: Yes

## 2019-07-29 NOTE — OP NOTE
DATE OF OPERATION: July 29, 2019      CO-SURGEONS:  Jeremiah Carrion MD, MD; Becky Grant MD.       ASSISTANT: NASEEM Ruiz      ANESTHESIA:  General endotracheal anesthesia.       PREOPERATIVE DIAGNOSIS:  L4-L5, L5--S1 spondylosis with kyphosis      POSTOPERATIVE DIAGNOSIS: Same      PROCEDURE:     1.  L5-S1 oblique lumbar intradiscal fusion (OLIF)  2. L4-L5 oblique lumbar intradiscal fusion (OLIF)      ESTIMATED BLOOD LOSS:  200 mL.       COMPLICATIONS:  None apparent.       INDICATIONS:  This 54 year old female saw Dr. Carrion of Spine Surgery for evaluation.  Treatment was recommended, which included anterior exposure for the  L4-S1 disk spaces.  I was consulted for exposure via retroperitoneal exposure.      DESCRIPTION OF PROCEDURE:  The patient was brought to the operating room, placed in the supine position.  After monitors were placed general anesthesia was achieved.  The patient was placed in a right lateral decubitus position. The L5-S1 disc space was identified under fluoroscopy. A left paramedian incision was created and the anterior rectus sheath incised along with the external oblique fascia. The retroperitoneal space was entered by going directly through the fibers of the external oblique muscle. The peritoneum was reflected off of the pelvic side wall and reflected toward the right.  A self-retaining radiolucent retractor was then placed.  The L5-S1 disk space was then encountered and the middle sacral artery divided with the cautery.  The disk space between L5 and S1, the L5 vertebral body, and S1 vertebral body were then cleared cephalad and caudally as well as laterally.  The disk and fusion portion as well as the instrumentation for the L5-S1 disk space will then be dictated by Dr. Carrion. The patient had a relatively high aortic bifurcation such that I was able to expose L4-L5 between the common iliac arteries taking care to retract the left iliac vein cephalad. The disk space between L4 and  L5, the L4 vertebral body, and L5 vertebral body were then cleared cephalad and caudally as well as laterally.  The disk and fusion portion as well as the instrumentation for the L4-L5 disk space will then be dictated by Dr. Carrion   The retractors were released and hemostasis appeared adequate. The area was again inspected for hemostasis and when this was assured, the external oblique and anterior rectus fascia were was closed with looped #0 PDS.  Interrupted 3-0 Vicryl sutures were then used for deep dermal followed by 4-0 subcuticular Monocryl followed by skin glue.  Prior to closure, 30 mL of 0.25% Marcaine was injected into the incision.  At this point, the remainder of the operation and fusion will be dictated by Dr. Carrion.  When I left the operating room, the patient was hemodynamically stable.          Becky Grant MD, FACS, RPVI  Professor and Chief, Vascular and Endovascular Surgery  [facility]  Cell: [phone]  [email or web address]

## 2019-07-29 NOTE — ANESTHESIA POSTPROCEDURE EVALUATION
Anesthesia POST Procedure Evaluation    Patient: Yun Sagastume   MRN:     [id number] Gender:   female   Age:    54 year old :      1965        Preoperative Diagnosis: Acquired Kyphosis, Lumbar Spondylosis, Lumbar Radicular Pain   Procedure(s):  (lateral) Anterior Lumbar Interbody Fusion Lumbar 4-5, Lumbar 5-Sacral 1: Use Of Infuse BMP (Medium Kit)  (prone) Steatlh Assisted Posterior Instrumented Spinal Fusion Lumbar 3-Pelvis, Transforaminal Lumbar Interbody Fusion L3-L4. (Right Facetectomy), Smith Carpio Osteotomy Lumbar 4-5, Lumbar 5-Sacral 1: Cement Augmentation Of Screws L3-L4   Postop Comments: No value filed.       Anesthesia Type:  Not documented  General    Reportable Event: NO     PAIN: Uncomplicated   Sign Out status: Comfortable, Well controlled pain     PONV: No PONV   Sign Out status:  No Nausea or Vomiting     Neuro/Psych: Uneventful perioperative course   Sign Out Status: Preoperative baseline; Age appropriate mentation     Airway/Resp.: Uneventful perioperative course   Sign Out Status: Non labored breathing, age appropriate RR; Resp. Status within EXPECTED Parameters     CV: Uneventful perioperative course   Sign Out status: Appropriate BP and perfusion indices; Appropriate HR/Rhythm     Disposition:   Sign Out in:  PACU  Disposition:  Floor  Recovery Course: Uneventful  Follow-Up: Not required           Last Anesthesia Record Vitals:  CRNA VITALS  2019 1601 - 2019 1701      2019             Pulse:  109    ART BP:  130/68    ART Mean:  88    SpO2:  100 %    Resp Rate (observed):  5  (Abnormal)           Last PACU Vitals:  Vitals Value Taken Time   BP 88/70 2019  6:15 PM   Temp 36.6  C (97.9  F) 2019  6:00 PM   Pulse 76 2019  6:15 PM   Resp 8 2019  6:17 PM   SpO2 97 % 2019  6:17 PM   Temp src     NIBP     Pulse     SpO2     Resp     Temp     Ht Rate     Temp 2     Vitals shown include unvalidated device data.      Electronically Signed By: Jacob LAM  MD Britt, July 29, 2019, 6:18 PM

## 2019-07-29 NOTE — OP NOTE
Date of Service:  7/29/2019       Surgeon:  Jeremiah Carrion MD   Co-surgeon for Part 1:  Becky Grant MD, vascular surgeon.  Dr. Grant's expertise as a vascular surgeon was necessary for safe anterior retroperitoneal approach to the lumbosacral spine.  Assistant:  Sara Renner PA-C (Parts 1 and 2).  No resident available.  Ms. Renner assisted in all parts of the procedure, including positioning, exposure, performing the surgical procedure, and closure.      Preoperative Diagnosis:     1.  Spinal sagittal malalignment with lower lumbar hypolordosis (L4-S1 = 22, ideal 36), PI-LL mismatch = 15, pelvic retroversion (PT = 31), and SVA = +7.6cm.  2.  Advanced spondylosis L3-4,L4-5,L5-S1.  3.  S/p lamienctomies L4-5,L5-S1 (2011, Transfeldt).  4.  Chronic opioid dependence (at last visit: oxycodone 20 mg/day; morphine SR 30 mg/day)   5.  Osteopenia and Vitamin D deficiency.      Postoperative Diagnosis:     Same      Procedures:   Part 1 - anterior procedure:  1.  Anterior lumbar interbody fusion (ALIF) in lateral position, L4-5 and L5-S1, using infuse BMP and crushed cancellus allograft.  2.  Placement of interbody titanium cage L4-5 and L5-S1.  3.  Placement of integrated buttress screw thru L4-5 cage into L4 vertebral body, and thru L5-S1 cage into S1 vertebral body.      Part 2 - posterior procedure:  1.  Sanford-Pretty osteotomies L4-5 and L5-S1.  2.  Transforaminal lumbar interbody fusion (TLIF) via right-sided facetectomy L3-4.  3.  Laminectomy L3-4.  4.  Posterior spinal fusion L3-sacrum, using Magnifuse allograft (bilateral intertransverse); Infuse BMP, local autograft and crushed cancellous allograft (interlaminar).  5.  Bilateral posterior segmental pedicle screw fixation L3-S1.  6.  Bilateral pelvic (S2AI) screw fixation.  7.  Cement augmentation of pedicle screws - bilateral L3 and L4.  8.  Computer navigation using O-arm and Stealth.      Anesthesia:  General endotracheal.   Local anesthetic:  0.25% marcaine +  epinephrine = 60 mL (30 mL part 1; 30 mL part 2).  EBL:  Part 1 = 150 mL; Part 2 = 1175 mL; TOTAL = 1325 mL (return 472 mL).  Complications:  None apparent.   Implants / Equipment used:   1.  Music Messenger (MM) Base titanium cages, 38 mm width x 28 mm depth:        L5-S1 = 8 mm post ht x 18 mm ant ht, 20 degrees lordotic; One 5.0 x 22.5 mm screw, inserted thru cage into S1.       L4-5 =   8 mm post ht x 18 mm ant ht, 20 degrees lordotic; One 5.0 x 22.5 mm screw, inserted thru cage into L4.  2.  Music Messenger (MM) CoRoent Large Oblique TLIF cage:  L3-4 = 12 mm height x 25 mm length, 4 degrees lordotic.  3.  Music Messenger (MM) Open Reline screw system: L3 = 6.5 x 50 mm fenestrated bilateral; L4 = \6.5 x 45 mm fenestrated bilateral; L5 = 7.5 x 50 mm traction right, 7.5 x 45 mm traction left; S1 = 7.5 x 45 mm traction right, 7.5 x 35 mm traction left; pelvis (S2AI) = 9.5 x 90 mm bilateral.  Precut, precontoured 5.5 mm x 120 mm CoCr rods x 2.  4.  Infuse BMP medium kit.  5.  Magnifuse allograft 20 cm strips x 2.  6.  Crushed cancellous allograft 45 mL.  7.  TXA 30 mg/kg LD then 10 mg/kg/hr.  8.  Vancomycin powder 1 gm (80% deep, 20% subcutaneous).        Indications:  54 year old female with chronic low back and leg pain and neurogenic claudication.  Had previous laminectomies L4-5 and L5-S1.  Imaging revealed advanced multilevel spondylosis L3-S1, with flat back syndrome.  Lower lumbar lordosis (L4-S1) measures 22 degrees, ideal 36 degrees.  15 degrees PI-LL mismatch..  Tried nonoperative treatment, continues to have significant disabling symptoms.  I thus offered surgery in form of 3-level fusion L3-S1, with pelvic fixation, posterior column osteotomies, and possible cement augmentation of screws.  Consented after thorough discussion of the rationale, risks, benefits and alternatives.  Discussed bone graft options; consented to use of Infuse BMP, allograft and local autograft.      Details:  Part 1 - anterior procedure:    Properly identified in  preop area, site marked, informed consent signed.  Wheeled to OR.  Brief earlier performed.  General anesthesia administered.  ERAS protocol.  Schuster inserted.  Antibiotic given: Ancef 2 gm IV then 1 gm q2h.  Positioned lateral left side up on Flat Trios table.  Abdominal region squared off, prepped with Chlorhexidine scrub, alcohol, ChloraPrep and draped in sterile fashion. Surgical timeout performed.  Both Dr. Grant and myself were present for timeout.      Dr. Grant performed left anterior retroperitoneal approach to lumbosacral spine in the lateral position via left anterolateral incision.  Please refer to her report for details.  Gigwalk lateral ALIF retractor system used.  Once exposed, G18 needle inserted into presumed L5-S1 disc; lateral image showed correct level; verified by two attending surgeons (Dr. Grant and myself); thus, radiologist verification not necessary.  Confirmatory timeout performed.  Rectangular annulectomy using long knife.  Cartilaginous endplate inferior L5 and superior S1 released using Stevens elevator.  Disc material removed using pituitary and kerrison rongeurs.  Noted significant disc space narrowing/collapse and stiffness.  Endplates prepped using straight and angled curettes.  Trial spacers placed 38 x 28 mm footprint; started with 10 degrees - 4, 6, and 8 mm.  Went up to 15 degree, 8 mm posterior height; then 20 degree, 8 mm posterior height.  Elected to use this size cage.  Titanium Base cage packed with BMP and allograft.  A medium kit Infuse containing 4 sponges was earlier prepared.  One sponge was used in each cage (2.1 mg).  Cage impacted into disc space.  A 22.5 mm screw was inserted thru the cage into S1 vertebral body, for buttressing purposes; no anterior fixation crossing the motion segment was performed.  Final AP-lateral images showed acceptable cage and screw placement, with good restoration of disc height and segmental lordosis.      Attention turned to L4-5.  Thru same  incision, Dr. Grant extended exposure cephalad.  Noted high bifurcation of great vessels above L4-5 disc.  We thus elected to expose L4-5 thru the bifurcation, between the iliac vessels.  Same retractors used.  Once exposed, rectangular annulotomy made.  Cartilaginous endplates released using Stevens elevator.  Disc material removed using pituitary and kerrison.  Again noted significant disc collapse and stiffness.  Used endplate distractor.  Endplates prepared using straight and angled curettes, and 6 mm ball tip anthony.  Trial spacers placed 38 x 28 mm footprint; started with 10 degrees - 4, 6, and 8 mm.  Went up to 15 degree, 8 mm posterior height; then 20 degree, 8 mm posterior height.  Elected to use this size cage.  Titanium Base cage packed with BMP and allograft.  A 22.5 mm screw was inserted thru the cage into L4 vertebral body, for buttressing purposes; no anterior fixation crossing the motion segment was performed.  Final AP-lateral images showed somewhat asymmetric cage placement, but deemed acceptable.  Irrigation.  Hemostasis observed.  Closure performed by Dr. Grant.  Anesthetic injected.  Sterile dressings applied.      Part 2 - posterior procedure:  Patient turned prone on Pro-Axis table.  Table flexed 10 degrees to facilitate exposure.  Lumbosacral region squared off, prepped with alcohol (earlier scrubbed with chlorhexidine during part 1 prep), chloraprep and draped in sterile fashion.  Surgical timeout performed.     Midline skin incision made utilizing previous surgical scar, extended both directions to cover ~ L3-pelvis; bilateral revision subperiosteal exposure out to transverse processes.  Once exposed, we placed spinous process clamp with kt frame at ~ L5.  O-arm 3D scan obtained L3-S1.  Used previously placed L4-5 and L5-S1 titanium cages for level localization on imagine; thus, radiologist verification not necessary.  Confirmatory timeout performed.       Placed bilateral pedicle screws L3-S1.   Anthony used to decorticate transverse processes / ala bilaterally at every level, and to create  hole using landmarks.  Track created using kt awl.  Stealth used to select screw size.  Undertapped by 1 size to depth of screw; used navigated tap.  Screw inserted using kt .  Noted decreased insertional torque; I thus made the decision to augment with cement at the top levels (L3 and L4), and use fenestrated screws at these levels.  Because of osteopenia, I elected to use Traction screws at L5 and S1.  Check spin showed mild lateral malposition at left L5, but deemed acceptable.      Spinous process clamp with kt frame repositioned at L3.  3D scan of pelvis taken.  Placed bilateral pelvic (S2AI) screws.  Used kt awl to create initial track down to level of sacroiliac joint; then drilled using kt drill with 90 mm stop.  Tapped to depth of screw using 6.5 mm and 9.5 mm kt taps.  Screw placed using kt .  Check scans showed good placement.     Sanford-Pretty oteotomies performed L4-5 and L5-S1.  Because of previous laminectomies, anatomy was quite altered, and with significant epidural/perineural adhesions; this necessitated significant extra time and effort > 25% usual.  I used rongeur to resect interspinous ligaments.  Resected bilateral descending processes using anthony and osteotome.  Bone saved for grafting purposes.  Bilateral ascending processes resected using same, flush to pedicle.  Released ligamentum flavum from epidural adhesions using curettes and [place] elevator.  Ligamentum flavum completely resected; thus achieved central and bilateral complete decompression at L4-5 and L5-S1.  Bleeders controlled using bipolar, Surgiflo and cottonoids.       TLIF performed L3-4.  Right sided complete facetectomy (inferior and superior articular processes) performed using anthony and osteotomes; bone saved for grafting.  Epidural bleeding controlled using bipolar, Surgiflo and cottonoids.  Rectangular  annulotomy and diskectomy performed.  Posterior osteophytes removed using osteotome.  Endplates prepared using paddle maria luz and curettes.  Trial spacers placed; elected to use 12 mm height cage.  Cage filled with 1 BMP sponge.  Prepacked disc space with ~ 8 mL autograft.  Inserted cage; used lateral C-arm imaging to ensure anterior placement of cage, prior to rotating 90 degrees to final position.  AP-lateral images showed good cage placement, with good disc height restoration.  I then performed L3-4 laminectomy including the contralateral (left) side, resecting the ligamentum flavum, and undercutting the lamina to ensure adequate bilateral decompression.     Cement mixed, waited to become doughy.  We injected cement approx 1 mL per screw bilateral L3 and L4.      Stitched lateral image showed improved L4-S1 lordosis, from preop 22 degrees to 28 degrees; still not reaching our target of 36 degrees.  Table gradually extended in 5 degree increments to 5 degrees extension (15 degree arc).  Noted posterior column shortening, indicating increased lordosis.  Luiz lengths measured; we elected to used 120 mm precut rods, bent into further lordosis at the bottom using Yi willis.  Left luiz seated on screw heads; set plugs placed.  Right luiz seated; set plugs placed.  Final AP and lateral stitched images taken.  Showed lumbar lordosis 57 degrees (preop PI 60 degrees), and L4-S1 lordosis 37 degrees (target 36 degrees).  These were very much acceptable.  Good coronal alignment.  Construct final tightened.  O-arm removed.      Posterior fusion performed L3-sacrum.  Irrigated.  Magnifuse bone graft placed at bilateral intertransverse pockets over decorticated transverse processes and sacral ala.  Posterior elements (spinous process, lamina) L3-sacrum decorticated using anthony.  Square gelfoam placed over laminar defects.  Remaining BMP sponge placed over L5-S1.  Morcellized local autograft placed over decorticated laminae,  augmented with allograft.  There some leftover allograft from anterior part, ~ 10 mL; also opened additional 15 mL allograft; thus, used ~ 25 mL allograft for posterior fusion.     Deep medium Hemovac drain placed.  Vancomycin powder 1 gm applied (80% deep, 20% subq).  Layered closure: #1 vicryl popoffs for deep fascia, reinforced with running 0 vicryl; another layer of running 0 vicryl for deep subq, 2-0 Vicryl for subq and 3-0 Monocryl.  Exofin and Aquacell dressings applied.  Injected anesthetic.  Turned supine, taken off general anesthetic, transferred to PACU in satisfactory condition.       Postop:  Admit to surgical floor IMC bed.  Antibiotics x 24 hrs.  Mechanical DVT prophylaxis.  Hospitalist medical co-management.  PT and OT consult.  NPO except for ice chips until with good bowel sounds and flatus; then advance as tolerated.  No lifting more than 10 pounds, no excessive bending or twisting.  Lumbar AP-lat standing x-rays (incl femoral heads) prior to discharge.  Anticipate discharge in 3-5 days.  RTC 6 weeks with EOS full-spine AP-lat x-rays.

## 2019-07-29 NOTE — ANESTHESIA PREPROCEDURE EVALUATION
Anesthesia Pre-Procedure Evaluation    Patient: Yun Sagastume   MRN:     [id number] Gender:   female   Age:    54 year old :      1965        Preoperative Diagnosis: Acquired Kyphosis, Lumbar Spondylosis, Lumbar Radicular Pain   Procedure(s):  (supine) Anterior Lumbar Interbody Fusion Lumbar 4-5, Lumbar 5-Sacral 1: Use Of Infuse BMP (Medium Kit)  (prone) Steatlh Assisted Posterior Instrumented Spinal Fusion Lumbar 3-Pelvis, Transforaminal Lumbar Interbody Fusion (Right Facetectomy), Possible Sanford Carpio Osteotomy Lumbar 3-4,Sanford Carpio Osteotomy Lumbar 4-5, Lumbar 5-Sacral 1: Cement Augmentation Of Screws     Past Medical History:   Diagnosis Date     Colon polyp      Depression      Diabetes (H)     Type 2     Gastric bypass status for obesity      HTN (hypertension)      Osteopenia      Post laminectomy syndrome       Past Surgical History:   Procedure Laterality Date     INJECT EPIDURAL TRANSFORAMINAL LUMBAR / SACRAL SINGLE Bilateral 2017    Procedure: INJECT EPIDURAL TRANSFORAMINAL LUMBAR / SACRAL SINGLE;  Bilateral lumbar 4 selective nerve root blocks;  Surgeon: Ney Toure MD;  Location: [facility] OR     INJECT EPIDURAL TRANSFORAMINAL LUMBAR / SACRAL SINGLE Bilateral 2017    Procedure: INJECT EPIDURAL TRANSFORAMINAL LUMBAR / SACRAL SINGLE;  Lumbar 5 Nerve Root Block, Bilateral;  Surgeon: Pedrito Carr MD;  Location: [facility] OR          Anesthesia Evaluation     . Pt has had prior anesthetic. Type: General, Regional and MAC    History of anesthetic complications   - PONV        ROS/MED HX    ENT/Pulmonary: Comment: Tested and negative    (+)GERALDO risk factors snores loudly, hypertension, tobacco use, Past use 20 pack years - quit 15 years ago packs/day  , . .    Neurologic:     (+)neuropathy - feet, hands: possible Raynaud's , other neuro Balance challenges, cervical paraspinal spasms, chart hx hyperreflexia    Cardiovascular:     (+) hypertension----. : . . . :. valvular  problems/murmurs told she had intermittent murmur:. Previous cardiac testing date:results:Stress Testdate:2012 results:STRESS ECHO 2012: negative for objective markers of exertional myocardial ischemia or previous MI. The low level of cardiac stress provoked severely limits the clinical utility of the findings reported herein.   LV function normal EF60%. No RWMA.    date: results: date: results:          METS/Exercise Tolerance:  3 - Able to walk 1-2 blocks without stopping   Hematologic: Comments: Hx anemia - abnormal uterine bleeding    (+) Anemia, History of Transfusion no previous transfusion reaction -      Musculoskeletal:   (+)  other musculoskeletal- osteopenia      GI/Hepatic: Comment: Bariatric surgery status SP Buster-en-Y        Renal/Genitourinary:  - ROS Renal section negative       Endo: Comment: Gestational diabetes    (+) type II DM Last HgA1c: 6.2% date: 12/2018 Not using insulin - not using insulin pump not previously admitted for DM/DKA Diabetic complications: neuropathy, Obesity, .      Psychiatric:     (+) psychiatric history anxiety and depression      Infectious Disease:  - neg infectious disease ROS       Malignancy:      - no malignancy   Other:    (+) No chance of pregnancy H/O Chronic Pain,H/O chronic opiod use ,                        PHYSICAL EXAM:   Mental Status/Neuro: A/A/O   Airway: Facies: Feasible  Mallampati: II  Mouth/Opening: Full  TM distance: > 6 cm  Neck ROM: Full   Respiratory: Auscultation: CTAB     Resp. Rate: Normal     Resp. Effort: Normal      CV: Rhythm: Regular  Rate: Age appropriate  Heart: Normal Sounds  Edema: None   Comments:      Dental: Normal Dentition                LABS:  CBC:   Lab Results   Component Value Date    WBC 5.0 07/10/2019    HGB 12.8 07/10/2019    HCT 40.2 07/10/2019     07/10/2019     BMP:   Lab Results   Component Value Date     07/10/2019    POTASSIUM 4.1 07/10/2019    CHLORIDE 99 07/10/2019    CO2 32 07/10/2019    BUN 12  "07/10/2019    CR 1.05 (H) 07/10/2019     (H) 07/10/2019     COAGS: No results found for: PTT, INR, FIBR  POC: No results found for: BGM, HCG, HCGS  OTHER:   Lab Results   Component Value Date    A1C 7.3 (H) 07/10/2019    MARY JO 9.2 07/10/2019        Preop Vitals    BP Readings from Last 3 Encounters:   07/10/19 114/76   08/30/17 110/70   08/17/17 121/89    Pulse Readings from Last 3 Encounters:   07/10/19 83   08/30/17 71   08/17/17 102      Resp Readings from Last 3 Encounters:   07/10/19 14   05/01/19 16   08/30/17 12    SpO2 Readings from Last 3 Encounters:   07/10/19 96%   08/30/17 97%   08/17/17 100%      Temp Readings from Last 1 Encounters:   07/10/19 36.7  C (98  F) (Oral)    Ht Readings from Last 1 Encounters:   07/10/19 1.727 m (5' 8\")      Wt Readings from Last 1 Encounters:   07/10/19 89.8 kg (197 lb 14.4 oz)    Estimated body mass index is 30.09 kg/m  as calculated from the following:    Height as of 7/10/19: 1.727 m (5' 8\").    Weight as of 7/10/19: 89.8 kg (197 lb 14.4 oz).     LDA:  Peripheral IV 08/17/17 Left Hand (Active)   Number of days: 710        Assessment:   ASA SCORE: 3    H&P: History and physical reviewed and following examination; no interval change.   Smoking Status:  Non-Smoker/Unknown   NPO Status: NPO Appropriate     Plan:   Anes. Type:  General   Pre-Medication: Midazolam   Induction:  IV (Standard)   Airway: ETT; Oral   Access/Monitoring: PIV; A-Line; 2nd PIV; FloTrac   Maintenance: Balanced     Postop Plan:   Postop Pain: Opioids; Long Acting Opioids; Ketamine  Postop Sedation/Airway: Not planned  Disposition: Inpatient/Admit     PONV Management:   Adult Risk Factors: Female, H/o PONV or Motion Sickness, Non-Smoker, Postop Opioids   Prevention: Ondansetron, Dexamethasone, Scopolamine     CONSENT: Direct conversation   Plan and risks discussed with: Patient   Blood Products: Consented (ALL Blood Products)                   Rolo Sorto DO  "

## 2019-07-29 NOTE — LETTER
Health Information Management Services               Recipient:  [facility]  [phone]      Sender:    Cely Rodriguez RN, BSN  Care Coordinator, 8A  Phone [phone]  Pager [phone]        Date: August 6, 2019  Patient Name:  Yun Sagastume  Routing Message:      Home care signed orders.      The documents accompanying this notice contain confidential information belonging to the sender.  This information is intended only for the use of the individual or entity named above.  The authorized recipient of this information is prohibited from disclosing this information to any other party and is required to destroy the information after its stated need has been fulfilled, unless otherwise required by state law.      If you are not the intended recipient, you are hereby notified that any disclosure, copy, distribution or action taken in reliance on the contents of these documents is strictly prohibited.  If you have received this document in error, please return it by fax to [phone] with a note on the cover sheet explaining why you are returning it (e.g. not your patient, not your provider, etc.).  If you need further assistance, please call [employer] Centralized Transcription at [phone].  Documents may also be returned by mail to [employer], , [facility] [address] [place], [place] [address].

## 2019-07-30 ENCOUNTER — APPOINTMENT (OUTPATIENT)
Dept: PHYSICAL THERAPY | Facility: CLINIC | Age: 54
End: 2019-07-30
Attending: PHYSICIAN ASSISTANT
Payer: COMMERCIAL

## 2019-07-30 ENCOUNTER — APPOINTMENT (OUTPATIENT)
Dept: PHYSICAL THERAPY | Facility: CLINIC | Age: 54
End: 2019-07-30
Attending: ORTHOPAEDIC SURGERY
Payer: COMMERCIAL

## 2019-07-30 LAB
ANION GAP SERPL CALCULATED.3IONS-SCNC: 5 MMOL/L (ref 3–14)
BASOPHILS # BLD AUTO: 0 10E9/L (ref 0–0.2)
BASOPHILS NFR BLD AUTO: 0.1 %
BUN SERPL-MCNC: 15 MG/DL (ref 7–30)
CALCIUM SERPL-MCNC: 7.5 MG/DL (ref 8.5–10.1)
CHLORIDE SERPL-SCNC: 102 MMOL/L (ref 94–109)
CO2 SERPL-SCNC: 28 MMOL/L (ref 20–32)
CREAT SERPL-MCNC: 0.97 MG/DL (ref 0.52–1.04)
DIFFERENTIAL METHOD BLD: ABNORMAL
EOSINOPHIL # BLD AUTO: 0 10E9/L (ref 0–0.7)
EOSINOPHIL NFR BLD AUTO: 0.1 %
ERYTHROCYTE [DISTWIDTH] IN BLOOD BY AUTOMATED COUNT: 15.4 % (ref 10–15)
GFR SERPL CREATININE-BSD FRML MDRD: 65 ML/MIN/{1.73_M2}
GLUCOSE BLDC GLUCOMTR-MCNC: 131 MG/DL (ref 70–99)
GLUCOSE BLDC GLUCOMTR-MCNC: 160 MG/DL (ref 70–99)
GLUCOSE SERPL-MCNC: 124 MG/DL (ref 70–99)
HCT VFR BLD AUTO: 28.9 % (ref 35–47)
HGB BLD-MCNC: 9.1 G/DL (ref 11.7–15.7)
IMM GRANULOCYTES # BLD: 0 10E9/L (ref 0–0.4)
IMM GRANULOCYTES NFR BLD: 0.4 %
LYMPHOCYTES # BLD AUTO: 1.4 10E9/L (ref 0.8–5.3)
LYMPHOCYTES NFR BLD AUTO: 16.8 %
MCH RBC QN AUTO: 28.1 PG (ref 26.5–33)
MCHC RBC AUTO-ENTMCNC: 31.5 G/DL (ref 31.5–36.5)
MCV RBC AUTO: 89 FL (ref 78–100)
MONOCYTES # BLD AUTO: 0.8 10E9/L (ref 0–1.3)
MONOCYTES NFR BLD AUTO: 9.3 %
NEUTROPHILS # BLD AUTO: 5.9 10E9/L (ref 1.6–8.3)
NEUTROPHILS NFR BLD AUTO: 73.3 %
NRBC # BLD AUTO: 0 10*3/UL
NRBC BLD AUTO-RTO: 0 /100
PLATELET # BLD AUTO: 205 10E9/L (ref 150–450)
POTASSIUM SERPL-SCNC: 4.2 MMOL/L (ref 3.4–5.3)
RBC # BLD AUTO: 3.24 10E12/L (ref 3.8–5.2)
SODIUM SERPL-SCNC: 135 MMOL/L (ref 133–144)
WBC # BLD AUTO: 8.1 10E9/L (ref 4–11)

## 2019-07-30 PROCEDURE — 25800030 ZZH RX IP 258 OP 636: Performed by: HOSPITALIST

## 2019-07-30 PROCEDURE — 25800030 ZZH RX IP 258 OP 636

## 2019-07-30 PROCEDURE — 25000132 ZZH RX MED GY IP 250 OP 250 PS 637: Performed by: PHYSICIAN ASSISTANT

## 2019-07-30 PROCEDURE — 25000132 ZZH RX MED GY IP 250 OP 250 PS 637: Performed by: STUDENT IN AN ORGANIZED HEALTH CARE EDUCATION/TRAINING PROGRAM

## 2019-07-30 PROCEDURE — 99221 1ST HOSP IP/OBS SF/LOW 40: CPT | Performed by: NURSE PRACTITIONER

## 2019-07-30 PROCEDURE — 00000146 ZZHCL STATISTIC GLUCOSE BY METER IP

## 2019-07-30 PROCEDURE — 99207 ZZC CONSULT E&M CHANGED TO INITIAL LEVEL: CPT | Performed by: NURSE PRACTITIONER

## 2019-07-30 PROCEDURE — 36415 COLL VENOUS BLD VENIPUNCTURE: CPT | Performed by: PHYSICIAN ASSISTANT

## 2019-07-30 PROCEDURE — 99232 SBSQ HOSP IP/OBS MODERATE 35: CPT | Performed by: INTERNAL MEDICINE

## 2019-07-30 PROCEDURE — 97530 THERAPEUTIC ACTIVITIES: CPT | Mod: GP | Performed by: PHYSICAL THERAPIST

## 2019-07-30 PROCEDURE — 85025 COMPLETE CBC W/AUTO DIFF WBC: CPT | Performed by: PHYSICIAN ASSISTANT

## 2019-07-30 PROCEDURE — 97116 GAIT TRAINING THERAPY: CPT | Mod: GP | Performed by: PHYSICAL THERAPIST

## 2019-07-30 PROCEDURE — 25000128 H RX IP 250 OP 636: Performed by: PHYSICIAN ASSISTANT

## 2019-07-30 PROCEDURE — 12000001 ZZH R&B MED SURG/OB UMMC

## 2019-07-30 PROCEDURE — 97161 PT EVAL LOW COMPLEX 20 MIN: CPT | Mod: GP | Performed by: PHYSICAL THERAPIST

## 2019-07-30 PROCEDURE — 80048 BASIC METABOLIC PNL TOTAL CA: CPT | Performed by: PHYSICIAN ASSISTANT

## 2019-07-30 RX ORDER — POLYETHYLENE GLYCOL 3350 17 G/17G
17 POWDER, FOR SOLUTION ORAL 2 TIMES DAILY
Status: DISCONTINUED | OUTPATIENT
Start: 2019-07-30 | End: 2019-08-06 | Stop reason: HOSPADM

## 2019-07-30 RX ORDER — SODIUM CHLORIDE 9 MG/ML
INJECTION, SOLUTION INTRAVENOUS
Status: COMPLETED
Start: 2019-07-30 | End: 2019-07-30

## 2019-07-30 RX ADMIN — GABAPENTIN 300 MG: 100 CAPSULE ORAL at 09:25

## 2019-07-30 RX ADMIN — CYCLOBENZAPRINE HYDROCHLORIDE 10 MG: 5 TABLET, FILM COATED ORAL at 22:10

## 2019-07-30 RX ADMIN — CEFAZOLIN SODIUM 2 G: 2 INJECTION, SOLUTION INTRAVENOUS at 00:02

## 2019-07-30 RX ADMIN — CYCLOBENZAPRINE HYDROCHLORIDE 10 MG: 5 TABLET, FILM COATED ORAL at 16:39

## 2019-07-30 RX ADMIN — SODIUM CHLORIDE 500 ML: 9 INJECTION, SOLUTION INTRAVENOUS at 13:31

## 2019-07-30 RX ADMIN — POLYETHYLENE GLYCOL 3350 17 G: 17 POWDER, FOR SOLUTION ORAL at 19:53

## 2019-07-30 RX ADMIN — Medication: at 07:14

## 2019-07-30 RX ADMIN — Medication: at 07:30

## 2019-07-30 RX ADMIN — SODIUM CHLORIDE: 9 INJECTION, SOLUTION INTRAVENOUS at 03:48

## 2019-07-30 RX ADMIN — SENNOSIDES AND DOCUSATE SODIUM 1 TABLET: 8.6; 5 TABLET ORAL at 09:25

## 2019-07-30 RX ADMIN — DOCUSATE SODIUM 200 MG: 100 CAPSULE, LIQUID FILLED ORAL at 09:25

## 2019-07-30 RX ADMIN — ACETAMINOPHEN 975 MG: 325 TABLET, FILM COATED ORAL at 09:26

## 2019-07-30 RX ADMIN — POLYETHYLENE GLYCOL 3350 17 G: 17 POWDER, FOR SOLUTION ORAL at 09:25

## 2019-07-30 RX ADMIN — CYCLOBENZAPRINE HYDROCHLORIDE 10 MG: 5 TABLET, FILM COATED ORAL at 09:41

## 2019-07-30 RX ADMIN — RANITIDINE 150 MG: 150 TABLET ORAL at 18:20

## 2019-07-30 RX ADMIN — SENNOSIDES AND DOCUSATE SODIUM 2 TABLET: 8.6; 5 TABLET ORAL at 19:50

## 2019-07-30 RX ADMIN — RANITIDINE 150 MG: 150 TABLET ORAL at 09:24

## 2019-07-30 RX ADMIN — DOCUSATE SODIUM 200 MG: 100 CAPSULE, LIQUID FILLED ORAL at 19:51

## 2019-07-30 RX ADMIN — CEFAZOLIN SODIUM 2 G: 2 INJECTION, SOLUTION INTRAVENOUS at 08:48

## 2019-07-30 RX ADMIN — PAROXETINE HYDROCHLORIDE HEMIHYDRATE 60 MG: 20 TABLET, FILM COATED ORAL at 09:25

## 2019-07-30 RX ADMIN — ACETAMINOPHEN 975 MG: 325 TABLET, FILM COATED ORAL at 18:20

## 2019-07-30 RX ADMIN — ACETAMINOPHEN 975 MG: 325 TABLET, FILM COATED ORAL at 03:49

## 2019-07-30 RX ADMIN — GABAPENTIN 300 MG: 100 CAPSULE ORAL at 19:53

## 2019-07-30 ASSESSMENT — PAIN DESCRIPTION - DESCRIPTORS
DESCRIPTORS: SPASM
DESCRIPTORS: ACHING;SORE
DESCRIPTORS: ACHING;SORE

## 2019-07-30 ASSESSMENT — ACTIVITIES OF DAILY LIVING (ADL)
ADLS_ACUITY_SCORE: 12

## 2019-07-30 NOTE — PLAN OF CARE
A/O x 4. Pain controlled well with 0.3 dilaudid PCA. Dressing to lower abd and posterior spine c/d/I. CMS and neuro's are intact, except for baseline neuropathy. No nausea or emesis. Tolerating clear liquids well. No CP or SOB. Wife present at the bedside. Repositioned every 2 hours. Ice packs applied to spine. Schuster patent and urine output improved overnight. Hemavac patent. Call light is in reach cont to assess.

## 2019-07-30 NOTE — BRIEF OP NOTE
Brief Operative Note    Preop Dx:   Acquired Kyphosis, Lumbar Spondylosis, Lumbar Radicular Pain  Post op Dx:   Same  Procedure:    Procedure(s):  (lateral) Anterior Lumbar Interbody Fusion Lumbar 4-5, Lumbar 5-Sacral 1: Use Of Infuse BMP (Medium Kit)  (prone) Steatlh Assisted Posterior Instrumented Spinal Fusion Lumbar 3-Pelvis, Transforaminal Lumbar Interbody Fusion L3-L4. (Right Facetectomy), Smith Carpio Osteotomy Lumbar 4-5, Lumbar 5-Sacral 1: Cement Augmentation Of Screws L3-L4  /Surgeon:     Jeremiah Carrion MD  Assistants:    Sara Renner PA-C  Anesthesia:   General  EBL:    >1000mL with 425mL returned via cell saver  Total IV Fluids:  See Anesthesia Record  Specimens:   None  Findings:   See Operative Dictation      Assessment and Plan: Yun Sagastume is a 54 year old female with PMH including HTN, osteopenia, PONV, gastric bypass now s/p above procedure on 7/29/19 with Dr. Carrion.     [facility] Primary  Activity: Up with assist until independent. No excessive bending or twisting. No lifting >10 lbs x 6 weeks. No Shelia lift for transfers.   Weight bearing status: WBAT.  Pain management: Transition from IV to PO as tolerated. No NSAIDs   Antibiotics: Ancef x 24 hours.  Diet: Begin with clear fluids and progress diet as tolerated.   DVT prophylaxis: SCDs only. No chemical DVT ppx needed.  Imaging: XR Upright Lumbar spine PTDC - ordered.  Labs: Hgb POD#1.  Bracing/Splinting: None.  Dressings: Keep Aquacel c/d/i x 7 days.  Drains: Hemovac. Document output per shift, will be discontinued at Orthopedic Surgery discretion.  Schuster catheter: Remove POD#1.   Physical Therapy/Occupational Therapy: Eval and treat.  Cultures: none.    Consults: Hospitalist, Pain Management.  Follow-up: Clinic with Dr. Carrion in 6 weeks with repeat x-rays.   Disposition: Pending progress with therapies, pain control on orals, and medical stability, anticipate discharge to home/TCU on POD #3-5.        I assisted with  positioning, prepping and draping, graft preparation and placement and closure.    Sara Renner PA-C  Pager #[phone]    Please page me at [phone] with any questions/concerns during regular weekday hours before 4pm. If there is no response, if it is a weekend, or if it is during evening hours then please page the orthopaedic surgery resident on call.

## 2019-07-30 NOTE — PROGRESS NOTES
[facility]    Medicine Progress Note - Hospitalist Service       Date of Admission:  7/29/2019  Assessment & Plan         1. SP L4-L5, L5S1 Fusion, POD#1  -Post operative mild hypotension now resolved. Stop IV fluids   - Perioperative blood loss at 1300 mls. Post op Hgb at 9.1. Continue to monitor   -transfuse for Hb less than 7  - Remainder of the management as per primary team      2. HT:   Hold lisinopril-hydrochlorothiazide     3. DM-2:   PTA diet controlled.  She got 8 mg decadran.   Given stable blood sugars, will stop sliding scale insulin and limit blood sugar checks to twice daily    4. Vit D def and Osteoporosis:   On Vit D supplements 13544 units every week    5. R&Y Gastric Bypass:   Avoid NSAIDs. She was on PPI for long time. Recently has been stopped. She is using OTC Zantac which seems to work. She takes it every other day. We will schedule it here.     6. Hyponatremia: ( Resolved)   Likely due to hypovolemia, which resolved with use of IV fluids     7. Anxiety and Depression:   Stable   Continue  paxil        Diet: Advance Diet as Tolerated: Regular Diet Adult  Diet    DVT Prophylaxis: Pneumatic Compression Devices  Schuster Catheter: in place, indication: Anesthesia  Code Status: Full Code      Disposition Plan   Expected discharge: Per primary team   Entered: Riaz Spear MD 07/30/2019, 11:14 AM       The patient's care was discussed with the Bedside Nurse, Care Coordinator/, Patient and Primary team.    Riaz Spear MD  Hospitalist Service  [facility]    ______________________________________________________________________    Interval History   Yun feels well this morning  Says that her pain is adequately controlled   No fever or chills  Says that she slept well  No chest pain/ SOB     Data reviewed today: I reviewed all medications, new labs and imaging results over the  last 24 hours. I personally reviewed no images or EKG's today.    Physical Exam   Vital Signs: Temp: 98.4  F (36.9  C) Temp src: Oral BP: 90/50 Pulse: 86 Heart Rate: 85 Resp: 16 SpO2: 96 % O2 Device: Nasal cannula Oxygen Delivery: 3 LPM  Weight: 198 lbs 13.68 oz  General Appearance: Awake, alert and not in distress  Respiratory: Normal breath sounds bilaterally   Cardiovascular: Normal heart sounds   GI: Soft, non tender. Normal bowel sounds   Skin: No bruising / bleeding   Other: Drain present. Moving both legs with symmetric strength and power     Data   Recent Labs   Lab 07/30/19  0638 07/29/19  2206 07/29/19  2048 07/29/19  1518 07/29/19  1230   WBC 8.1  --   --   --   --    HGB 9.1* 9.8*  --  11.5* 10.8*   MCV 89  --   --   --   --      --   --   --   --      --  134 129* 130*   POTASSIUM 4.2  --   --  4.8 4.3   CHLORIDE 102  --   --   --   --    CO2 28  --   --   --   --    BUN 15  --   --   --   --    CR 0.97  --   --   --   --    ANIONGAP 5  --   --   --   --    MARY JO 7.5*  --   --   --   --    *  --   --  192* 202*

## 2019-07-30 NOTE — PROGRESS NOTES
Vascular Surgery Staff    Patient seen at bedside sedated and intubated. Doppler signals present in LLE at DP and PT. Both feet and heels protected.    Discussed case with Dr Saleem as Neurology note from mid-day was suggesting encouraging progress on TCD. Shortly thereafter patient had an increase in ICP. His thoughts are to wait for another two weeks. Given near-total occlusion of L CFA, OVI 0.4 and inability to anticoagulate, the chances are high that she will develop pressure ulceration on the right foot given her immobility and despite excellent protection - merely due to the diminished flow to the SFA and profunda and her relative hypercoagulable state.     Case has been cancelled. Vascular will follow peripherally and re-engage in 10-14 days to see what progress she is making. Please call if questions arise.    Becky Grant MD, FACS, RPVI  Director, [facility]  Chief, Vascular and Endovascular Surgery  [facility]  Cell: [phone]  [email or web address]

## 2019-07-30 NOTE — PROGRESS NOTES
VASCULAR SURGERY PROGRESS NOTE    SUBJECTIVE:  Patient found sitting up in bed, reports 6-7/10 back pain.  Has not gotten OOB yet.  Family at bedside.     OBJECTIVE:  Vital signs:  Temp: 98.4  F (36.9  C) Temp src: Oral BP: 90/50 Pulse: 86 Heart Rate: 85 Resp: 16 SpO2: 94 % O2 Device: None (Room air) Oxygen Delivery: 2 LPM      Intake/Output Summary (Last 24 hours) at 7/30/2019 0947  Last data filed at 7/30/2019 0900  Gross per 24 hour   Intake 5838.5 ml   Output 2255 ml   Net 3583.5 ml       Vitals:    07/29/19 0558   Weight: 90.2 kg (198 lb 13.7 oz)       PHYSICAL EXAM:  NEURO/PSYCH: The patient is alert and oriented.  Appropriate.  Moves all extremities.    SKIN: warm and dry..  PULMONARY: unlabored breathing, not requiring supplemental oxygen    ABDOMEN: soft, non-tender, left lower quadrant incision C/D/I   EXTREMITIES: palpable bilateral DP and PT pulses.      BMP  Recent Labs   Lab 07/30/19  0638 07/29/19  2048 07/29/19  1518 07/29/19  1230    134 129* 130*   POTASSIUM 4.2  --  4.8 4.3   CHLORIDE 102  --   --   --    CO2 28  --   --   --    BUN 15  --   --   --    CR 0.97  --   --   --    *  --  192* 202*     CBC  Recent Labs   Lab 07/30/19  0638 07/29/19  2206 07/29/19  1518 07/29/19  1230   WBC 8.1  --   --   --    HGB 9.1* 9.8* 11.5* 10.8*     --   --   --      INR/PTTNo lab results found in last 7 days.  ABG  Recent Labs   Lab 07/29/19  1518 07/29/19  1230   PH 7.40 7.40   PCO2 41 41   PO2 185* 167*   HCO3 25 26         ASSESSMENT:  54 year old female with lumbar spine spondylosis who is status post L4-5 and L5-S1 OLIF with anterior exposure with Dr. Grant and Dr. Carrion on 7/29/2019           PLAN:  - okay to leave LQ quadrant incision open to air  - continue therapies  - overall care per orthopedic team    Vascular surgery will follow peripherally, please contact team with any questions or concerns.            Belinda CERVANTES, CNS  Division of Vascular Surgery  [facility]  [place]    Pager [phone]  Office [phone]

## 2019-07-30 NOTE — PLAN OF CARE
"      VS:   BP 99/62   Pulse 88   Temp 98.5  F (36.9  C) (Oral)   Resp 12   Ht 1.727 m (5' 8\")   Wt 90.2 kg (198 lb 13.7 oz)   SpO2 93%   BMI 30.24 kg/m    Denies chest pain and SOB. On 3 L O2 while sleeping was on 1 L while awake.    Output:   Low urine output, Moonlighter notified. Pt had output of 50 mL since 1900. Not passing gas yet.    Activity:   Ax1 with rolling, has not been out of bed yet.    Skin: Intact ex for incision.    Pain:   Tolerable with Dilaudid PCA at 0.3, pain is sharp and incisional.    Neuro/CMS:   Pt denied n/t. A&Ox4. Good strengths    Dressing(s):   C/d/i   Diet:   Clears, tolerating well. Scop patch on.    LDA:   PIV infusing 125 mL/hr.   Hemovac patent and draining.    Equipment:   CAPNO, PCA pump, PCDs   Plan:   Continue to monitor. Pt is able to make needs known, call light is within reach.    Additional Info:   Arrived to unit at 1900       "

## 2019-07-30 NOTE — CONSULTS
Inpatient Pain Management Service: Consultation      DATE OF CONSULT:July 30, 2019      REASON FOR PAIN CONSULTATION:  Yun Sagastume is a 54 year old female I am seeing in consultation at the request of Sara EVANS, Ortho Service for evaluation and recommendations for her acute post op back pain.       CHIEF PAIN COMPLAINT: acute on chronic low back pain      ASSESSMENT:   Acute post op low back pain s/p L4-5 L5-S1 lumbar interbody fusion with Dr. Quiles and Dr. Becky Grant on 7/29/19 due to acquired kyphosis, lumbar spondylosis, lumbar radicular pain.    H/O laminectomy 2011 at L4-L5 with complications of post laminectomy syndrome    Chronic pain syndrome - Follows with [facility]    Patient stopped taking MS contin ER 15mg BID in May in preparation of this surgery    Neuropathy in b/l toes    HTN    DM Type II - diet controlled    S/P Buster-en-Y bariatric surgery - no NSAIDS    Obesity - BMI 30.24 kg/m2    Depression    Osteopenia    Former smoker - quit 15yrs ago    Pt was seen in PAC on 7/10/19 by Leon SmallsD        -- Outpatient opioid requirements prior to admission: Oxycodone 10mg po QID   -- [place]  was reviewed.  Last script for oxycodone 10mg qty 120 tabs for 30 days filled on 7/3/19. Last script for MS continue ER 15mg qty 60 tabs for 30 days filled on 5/9/19    Primary Care Provider: Mary Ellen Salgado  Chronic Pain Provider: [facility]        TREATMENT RECOMMENDATIONS/PLAN:     Continue HYDROmorphone PCA - will transition to oral tomorrow if tolerating CLD and passing flatus    Continue acetaminophen 975mg po q 8hrs scheduled    Continue hydroxyzine 25mg po q 6hrs PRN - this is a home medication    Continue gabapentin 300mg po BID for three days - pt does not want to stay on this medication long term.  Pt was on gabapentin after first lumbar surgery and it caused memory deficits.    Continue bowel regimen to prevent opioid induced constipation        Pain Service will  Continue to follow at this time.     Recommendations were discussed with   Plan was reviewed by the Pain Service Team and Dr. Leonor Mendez, Anesthesiologist.    Thank you for the opportunity to participate in the care of this patient.  The above recommendations are to be acted upon at the primary team s discretion.      To reach us:  Mon - Friday 8 AM - 3 PM: Pager [phone] (Text Page)  After hours, weekends and holidays: Primary service should call [phone] for the on-call pain specialist    HISTORY OF PRESENT ILLNESS:   Yun Sagastume is a 54 year old female s/p supine anterior lumbar interbody fusion L4-5, L5-S1, prone posterior instrumented spinal fusion L3 to pelvis, osteotomies, screws, with Dr. Quiles and Dr. Becky Grant, on 7/29/19.  She is status post laminectomy 2011 at L4-L5 with complications of post laminectomy syndrome.  She has chronic lower back pain associated with decreased mobility, radiating into the buttocks and legs R>L.  At baseline her chronic pain is 7-10 on a 0/10 VAS. She is on chronic narcotics managed by [facility].  She weaned off morphine ER in May 2019 in preparation for this surgery.  She currently takes oxycodone 10 mg 4 times a day, Flexeril twice a day, and Vistaril. She has comorbidities of hypertension, type 2 diabetes, and she is status post Buster-en-Y bariatric surgery without any complications (no NSAIDS).    Pt was evaluated at ~ 0915.  Pt was alert and conversant. Significant other was present.  Pt reports the pain is manageable.  She has not gotten out of bed since surgery.  Therapy is scheduled today at 11 am and 3:30 pm.  She has mostly low back pain. The intensity is moderate 6-7 on a 0/10 VAS. Staying in one position too long makes the pain worse as well as certain movements.  PCA is effective in reducing the pain to a tolerable level as well as using cold packs.  She has not had any flexeril or visteril since surgery.   The pain feels like an  intense ache.  She does not have any anterior left groin pain or leg pain currently.  She did not know she could press the PCA q 10 minutes.  I encouraged her to stay on top of the pain and use PCA, flexeril and visteril. She denies constipation with opioids.  Denies N/V this am. She has been taking CL fine.  I discussed with the patient and significant other all pain medications available, the dose and frequency.  Pt does not want to stay on gabapentin for long period of time because she took it after her first lumbar surgery and is causes memory deficits.  Explained will keep PCA today and hopefully transition her to oral pain medication tomorrow if she tolerated CLD and is passing flatus.  There were no further questions. Pt is agreeable to the plan of care.      PAIN HISTORY:   Location: Low back  Duration: years  Pain intensity: 7-10  Quality of the pain: burning, ache, cramping, shooting  Aggravating factors: staying in one position too long, movement  Relieving factors : changing positions frequently, rest, pain medications, cold packs      REVIEW OF 10 BODY SYSTEMS: 10 point ROS of systems including Constitutional, Eyes, Respiratory, Cardiovascular, Gastroenterology, Genitourinary, Integumentary, Musculoskeletal, Psychiatric were all negative except for pertinent positives noted in my HPI.       INPATIENT MEDICATIONS PERTINENT TO PAIN CONSULT:   Medications related to Pain Management (From now, onward)    Start     Dose/Rate Route Frequency Ordered Stop    08/01/19 0000  acetaminophen (TYLENOL) tablet 650 mg      650 mg Oral EVERY 4 HOURS PRN 07/29/19 1746      07/30/19 0800  polyethylene glycol (MIRALAX/GLYCOLAX) Packet 17 g      17 g Oral DAILY 07/29/19 1845      07/29/19 2000  gabapentin (NEURONTIN) capsule 300 mg      300 mg Oral 2 TIMES DAILY 07/29/19 1746 08/01/19 1959 07/29/19 2000  senna-docusate (SENOKOT-S/PERICOLACE) 8.6-50 MG per tablet 1 tablet      1 tablet Oral 2 TIMES DAILY 07/29/19 1845       07/29/19 2000  senna-docusate (SENOKOT-S/PERICOLACE) 8.6-50 MG per tablet 2 tablet      2 tablet Oral 2 TIMES DAILY 07/29/19 1845 07/29/19 2000  docusate sodium (COLACE) capsule 200 mg      200 mg Oral 2 TIMES DAILY 07/29/19 1845 07/29/19 1845  lidocaine 1 % 0.1-1 mL      0.1-1 mL Other EVERY 1 HOUR PRN 07/29/19 1845 07/29/19 1845  lidocaine (LMX4) cream       Topical EVERY 1 HOUR PRN 07/29/19 1845 07/29/19 1845  bisacodyl (DULCOLAX) Suppository 10 mg      10 mg Rectal DAILY PRN 07/29/19 1845 07/29/19 1845  HYDROmorphone (DILAUDID) tablet 2-4 mg      2-4 mg Oral EVERY 3 HOURS PRN 07/29/19 1845 07/29/19 1800  HYDROmorphone (DILAUDID) PCA 1 mg/mL OPIOID NAIVE       Intravenous CONTINUOUS 07/29/19 1746      07/29/19 1800  acetaminophen (TYLENOL) tablet 975 mg      975 mg Oral EVERY 8 HOURS 07/29/19 1746 08/01/19 1759    07/29/19 1746  cyclobenzaprine (FLEXERIL) tablet 10 mg      10 mg Oral 3 TIMES DAILY PRN 07/29/19 1746      07/29/19 1746  hydrOXYzine (ATARAX) tablet 25 mg      25 mg Oral EVERY 6 HOURS PRN 07/29/19 1746              CURRENT MEDICATIONS:   Current Facility-Administered Medications Ordered in Epic   Medication Dose Route Frequency Provider Last Rate Last Dose     [START ON 8/1/2019] acetaminophen (TYLENOL) tablet 650 mg  650 mg Oral Q4H PRN Sara Renner PA-C         acetaminophen (TYLENOL) tablet 975 mg  975 mg Oral Q8H Sara Renner PA-C   975 mg at 07/30/19 0349     benzocaine-menthol (CEPACOL) 15-3.6 MG lozenge 1 lozenge  1 lozenge Buccal Q1H PRN Sara Renner PA-C         bisacodyl (DULCOLAX) Suppository 10 mg  10 mg Rectal Daily PRN Sara Renner PA-C         calcium carbonate (TUMS) chewable tablet 1,000 mg  1,000 mg Oral 4x Daily PRN Sara Renner PA-C         ceFAZolin (ANCEF) intermittent infusion 2 g in 100 mL dextrose PRE-MIX  2 g Intravenous Q8H Sara Renner PA-C   2 g at 07/30/19 0002     cyclobenzaprine (FLEXERIL) tablet 10 mg  10 mg  Oral TID PRN BillSara guerrier PA-C         docusate sodium (COLACE) capsule 200 mg  200 mg Oral BID Sara Renner PA-C   200 mg at 07/29/19 1933     ranitidine (ZANTAC) tablet 150 mg  150 mg Oral Q12H Sara Renner PA-C   150 mg at 07/29/19 1933    Or     famotidine (PEPCID) infusion 20 mg  20 mg Intravenous Q12H Sara Renner PA-C         gabapentin (NEURONTIN) capsule 300 mg  300 mg Oral BID DamianingSara PA-C   300 mg at 07/29/19 1933     HYDROmorphone (DILAUDID) PCA 1 mg/mL OPIOID NAIVE   Intravenous Continuous Sara Renner PA-C         HYDROmorphone (DILAUDID) tablet 2-4 mg  2-4 mg Oral Q3H PRN Sara Renner PA-C         hydrOXYzine (ATARAX) tablet 25 mg  25 mg Oral Q6H PRN Sara Renner PA-C         lidocaine (LMX4) cream   Topical Q1H PRN Sara Renner PA-C         lidocaine 1 % 0.1-1 mL  0.1-1 mL Other Q1H PRN Sara Renner PA-C         melatonin tablet 1 mg  1 mg Oral At Bedtime PRN Sara Renner PA-C         metoclopramide (REGLAN) tablet 10 mg  10 mg Oral Q6H PRN Sara Renner PA-C        Or     metoclopramide (REGLAN) injection 10 mg  10 mg Intravenous Q6H PRN Sara Renner PA-C         naloxone (NARCAN) injection 0.1-0.4 mg  0.1-0.4 mg Intravenous Q2 Min PRN Sorto, Rolo H, DO         ondansetron (ZOFRAN-ODT) ODT tab 4 mg  4 mg Oral Q6H PRN Sara Renner PA-C        Or     ondansetron (ZOFRAN) injection 4 mg  4 mg Intravenous Q6H PRN Sara Renner PA-C         PARoxetine (PAXIL) tablet 60 mg  60 mg Oral QAM Sara Renner PA-C         polyethylene glycol (MIRALAX/GLYCOLAX) Packet 17 g  17 g Oral Daily Sara Renner PA-C         prochlorperazine (COMPAZINE) injection 10 mg  10 mg Intravenous Q6H PRN Sara Renner PA-C        Or     prochlorperazine (COMPAZINE) tablet 10 mg  10 mg Oral Q6H PRN Sara Renner PA-C         ranitidine (ZANTAC) tablet 150 mg  150 mg Oral BID PRN Sara Renner PA-C         senna-docusate (SENOKOT-S/PERICOLACE) 8.6-50 MG  per tablet 1 tablet  1 tablet Oral BID Sara Renner PA-C   1 tablet at 07/29/19 1933    Or     senna-docusate (SENOKOT-S/PERICOLACE) 8.6-50 MG per tablet 2 tablet  2 tablet Oral BID Sara Renner PA-C         sodium chloride (PF) 0.9% PF flush 3 mL  3 mL Intracatheter q1 min prn Sara Renner PA-C         sodium chloride (PF) 0.9% PF flush 3 mL  3 mL Intracatheter Q8H Sara Renner PA-C         sodium chloride 0.9% infusion   Intravenous Continuous Mathieu Pacheco  mL/hr at 07/30/19 0348       No current [facility]-ordered outpatient medications on file.         HOME/PREVIOUS MEDICATIONS:   Prior to Admission medications    Medication Sig Start Date End Date Taking? Authorizing Provider   acetaminophen (TYLENOL) 500 MG tablet Take 1,000 mg by mouth every 6 hours as needed for mild pain   Yes Unknown, Entered By History   cyclobenzaprine (FLEXERIL) 10 MG tablet Take 10 mg by mouth 2 times daily as needed  8/18/16  Yes Reported, Patient   hydrOXYzine (VISTARIL) 25 MG capsule Take 25 mg by mouth 2 times daily as needed  4/3/19  Yes Reported, Patient   lisinopril-hydrochlorothiazide (PRINZIDE/ZESTORETIC) 10-12.5 MG per tablet Take 1 tablet by mouth daily   Yes Reported, Patient   mupirocin (BACTROBAN) 2 % external ointment  7/12/19  Yes Reported, Patient   oxyCODONE IR (ROXICODONE) 10 MG tablet Take 10 mg by mouth every 6 hours as needed for severe pain   Yes Unknown, Entered By History   PARoxetine (PAXIL) 40 MG tablet Take 60 mg by mouth every morning  4/1/16  Yes Reported, Patient   ranitidine (ZANTAC) 150 MG tablet Take 150 mg by mouth 2 times daily as needed for heartburn   Yes Unknown, Entered By History   vitamin D2 (ERGOCALCIFEROL) 73424 units (1250 mcg) capsule Take 1 capsule (50,000 Units) by mouth once a week  Patient taking differently: Take 50,000 Units by mouth once a week Fridays 4/9/19   Jeremiah Carrion MD         ALLERGIES:    Allergies   Allergen Reactions     Aspirin       Codeine Nausea     20 years ago     Nsaids      Amoxicillin Rash          PAST MEDICAL AND PSYCHIATRIC HISTORY:    Past Medical History:   Diagnosis Date     Colon polyp      Depression      Diabetes (H)     Type 2     Gastric bypass status for obesity      HTN (hypertension)      Osteopenia      Post laminectomy syndrome          PAST SURGICAL HISTORY:   Past Surgical History:   Procedure Laterality Date     INJECT EPIDURAL TRANSFORAMINAL LUMBAR / SACRAL SINGLE Bilateral 8/17/2017    Procedure: INJECT EPIDURAL TRANSFORAMINAL LUMBAR / SACRAL SINGLE;  Bilateral lumbar 4 selective nerve root blocks;  Surgeon: Ney Toure MD;  Location: [facility] OR     INJECT EPIDURAL TRANSFORAMINAL LUMBAR / SACRAL SINGLE Bilateral 8/30/2017    Procedure: INJECT EPIDURAL TRANSFORAMINAL LUMBAR / SACRAL SINGLE;  Lumbar 5 Nerve Root Block, Bilateral;  Surgeon: Pedrito Carr MD;  Location: [facility] OR         FAMILY HISTORY: family history is not on file.      HEALTH & LIFESTYLE PRACTICES:   Tobacco:  reports that she quit smoking about 15 years ago. Her smoking use included cigarettes. She has never used smokeless tobacco.  Alcohol:  has no alcohol history on file.  Illicit drugs:  has no drug history on file.      SOCIAL HISTORY: Pt is  with two children.  Pt is applying for social security disability.      LABORATORY VALUES:   Last Basic Metabolic Panel:  Lab Results   Component Value Date     07/29/2019      Lab Results   Component Value Date    POTASSIUM 4.8 07/29/2019     Lab Results   Component Value Date    CHLORIDE 99 07/10/2019     Lab Results   Component Value Date    MARY JO 9.2 07/10/2019     Lab Results   Component Value Date    CO2 32 07/10/2019     Lab Results   Component Value Date    BUN 12 07/10/2019     Lab Results   Component Value Date    CR 1.05 07/10/2019     Lab Results   Component Value Date     07/29/2019       CBC results:  Lab Results   Component Value Date    WBC 5.0  07/10/2019     Lab Results   Component Value Date    HGB 9.8 07/29/2019     Lab Results   Component Value Date    HCT 40.2 07/10/2019     Lab Results   Component Value Date     07/10/2019       DIAGNOSTIC TESTS:   Unavailable.    Labs above reviewed as well as additional relevant diagnostic studies from the EPIC record.       PHYSICAL EXAMINATION:  VITAL SIGNS:  B/P: 90/48, T: 98.5, P: 86, R: 12    CONSTITUTIONAL/GENERAL APPEARANCE:overweight, middle age female lying in hospital bed NAD  EYES: PERRL,  Pupils 2+  ENT:  No nasal drainage,  MM dry   RESPIRATORY: CTA, no wheezes or rales on 2L NC, 02 sats mid 90s, no use of accessory muscles  CARDIOVASCULAR: RRR, no murmurs  GI:soft, nontender, obese, no guarding, LLQ incision well approximated, no erythema or ecchymosis around incision, BS hypoactive  : jamison catheter in place with yellow urine  MUSCULOSKELETAL/SPINE/EXTREMITIES: back dressing c/d/i, hemovac in place to upper thoracic,no erythema or ecchymosis around incision, moves all extremities spontaneously   GAIT: not observed  NEURO:  Alert, oriented, answers questions appropriately, sensation in tact to b/l LE  SKIN/VASCULAR EXAM:  Warm, dry, no rash on abdomen or back  PSYCHIATRIC/BEHAVIORAL/OBSERVATIONS:  No objective signs of pain observed during our interview.   Judgment/Insight - appropriate   Orientation - intact   Memory - good historian   Mood and affect - appropriate         BILLY Polanco CNP  July 30, 2019, 6:58 AM  Inpatient Pain Management Service

## 2019-07-30 NOTE — PLAN OF CARE
Discharge Planner PT   Patient plan for discharge: Home with family for assist.   Current status: PT evaluation completed.  Pt demonstrated rolling to right side with HOB elevated, bed rail, and SBA x 1.  Sidelying to sitting at EOB with HOB elevated and SBA x 1.  Sit to/from standing from EOB with FWW and min A x 1.  Pt was able to take 3-4 steps forwards/backwards with FWW and CGA x 1.  Sitting to supine with HOB elevated, bed rail, and min A x 1.    Barriers to return to prior living situation: Stairs to get into the home and level of assist  Recommendations for discharge: Home with assist  Rationale for recommendations: Pt would benefit from further inpatient PT for LE strengthening and increase independence with all functional mobility/gait.        Entered by: Tabby Barakat 07/30/2019 12:03 PM

## 2019-07-30 NOTE — PROGRESS NOTES
07/30/19 1124   Quick Adds   Type of Visit Initial PT Evaluation       Present no   Language English   Living Environment   Lives With child(salina), adult   Living Arrangements house   Home Accessibility stairs to enter home   Number of Stairs, Main Entrance 2   Stair Railings, Main Entrance none   Transportation Anticipated family or friend will provide   Self-Care   Usual Activity Tolerance moderate   Current Activity Tolerance fair   Regular Exercise No   Equipment Currently Used at Home none   Activity/Exercise/Self-Care Comment Pt was independent with all ADLs at baseline.    Functional Level Prior   Ambulation 0-->independent   Transferring 0-->independent   Toileting 0-->independent   Bathing 0-->independent   Communication 0-->understands/communicates without difficulty   Swallowing 0-->swallows foods/liquids without difficulty   Cognition 0 - no cognition issues reported   Fall history within last six months no   Which of the above functional risks had a recent onset or change? ambulation;transferring   Prior Functional Level Comment Pt was independent with all functional mobility at baseline.    General Information   Onset of Illness/Injury or Date of Surgery - Date 07/29/19   Referring Physician Jonnie Carrion MD   Patient/Family Goals Statement Pt would like to get back to bike riding and walking.    Pertinent History of Current Problem (include personal factors and/or comorbidities that impact the POC) Pt is a 55 y/o female s/p L4-5 and L5-S1 ALIF and PLIF, see chart for PMH.    Precautions/Limitations fall precautions;spinal precautions   Weight-Bearing Status - LUE full weight-bearing   Weight-Bearing Status - RUE full weight-bearing   Weight-Bearing Status - LLE weight-bearing as tolerated   Weight-Bearing Status - RLE weight-bearing as tolerated   Heart Disease Risk Factors High blood pressure;Diabetes   General Observations Pt supine in bed at start of PT evaluation.  Pt  getting IV fluids, PCA, hemovac, jamison, PCDs, oxygen therapy and pulse oxy.    Cognitive Status Examination   Orientation other (see comments)  (Not tested)   Level of Consciousness alert   Follows Commands and Answers Questions 100% of the time;able to follow multistep instructions   Personal Safety and Judgment intact   Memory intact   Pain Assessment   Patient Currently in Pain Yes, see Vital Sign flowsheet   Integumentary/Edema   Integumentary/Edema other (describe)   Integumentary/Edema Comments Incision not observed due to post-op dressing in place.    Posture    Posture Forward head position;Protracted shoulders   Range of Motion (ROM)   ROM Comment Pt demonstrated functional LE ROM during bed mobility, transfers, and gait.    Strength   Strength Comments LE strength not formally tested.  Pt demonstrated functional strength for transfers/gait.    Bed Mobility   Bed Mobility Comments Pt demonstrated rolling to right side with HOB elevated and SBA x 1.  Sidelying to/from sitting with HOB elevated, bed rail, and SBA to min A x 1.     Transfer Skills   Transfer Comments Sit to/from standing from EOB with FWW and CGA x 1.     Gait   Gait Comments Pt was able to take 3-4 steps forwards/backwards with FWW and CGA x 1.     Balance   Balance Comments Pt demonstrated good sitting balance at EOB and fair standing balance with FWW.     Sensory Examination   Sensory Perception other (describe)   Sensory Perception Comments Pt reported having thigh numbness post surgery, did have some numbness in feet and fingers prior to surgery.    General Therapy Interventions   Planned Therapy Interventions bed mobility training;gait training;transfer training;strengthening;home program guidelines   Intervention Comments Bed mobility, transfers, and gait initiated.    Clinical Impression   Criteria for Skilled Therapeutic Intervention yes, treatment indicated   PT Diagnosis Decreased strength, decreased ROM, and impaired functional  "mobility.    Influenced by the following impairments Post-op pain, weakness, and s/p L4-5 and L5-S1 ALIF and PLIF.    Functional limitations due to impairments Impaired bed mobility, transfers, and gait.    Clinical Presentation Stable/Uncomplicated   Clinical Presentation Rationale Pt is a 55 y/o female s/p L4-5 and L5-S1 ALIF and PLIF.  Pt was independent with all functional mobility/ADLs at baseline.  Pt does not have any significant PMH that will impact PT POC.    Clinical Decision Making (Complexity) Low complexity   Therapy Frequency 2x/day   Predicted Duration of Therapy Intervention (days/wks) 5 days   Anticipated Equipment Needs at Discharge other (see comments)  (No equipment needs)   Anticipated Discharge Disposition Home with Assist   Risk & Benefits of therapy have been explained Yes   Patient, Family & other staff in agreement with plan of care Yes   [facility] TM \"6 Clicks\"   2016, Trustees of [facility], under license to [employer].  All rights reserved.   6 Clicks Short Forms Basic Mobility Inpatient Short Form   [facility]  \"6 Clicks\" V.2 Basic Mobility Inpatient Short Form   1. Turning from your back to your side while in a flat bed without using bedrails? 4 - None   2. Moving from lying on your back to sitting on the side of a flat bed without using bedrails? 4 - None   3. Moving to and from a bed to a chair (including a wheelchair)? 3 - A Little   4. Standing up from a chair using your arms (e.g., wheelchair, or bedside chair)? 3 - A Little   5. To walk in hospital room? 3 - A Little   6. Climbing 3-5 steps with a railing? 2 - A Lot   Basic Mobility Raw Score (Score out of 24.Lower scores equate to lower levels of function) 19   Total Evaluation Time   Total Evaluation Time (Minutes) 10     "

## 2019-07-30 NOTE — CONSULTS
[facility], [place]    Hospitalist Consultation    Date of Admission:  7/29/2019  Date of Consult (When I saw the patient): 07/29/19    Assessment & Plan     1. SP L4-L5, L5S1 Fusion:    -post operative mild hypotension  -give 500 ml fluid bolus. EBL was 1300 ml, got back 500 ml.   -check hb tomorrow, suspect anemia  -transfuse for Hb less than 7    2. HT: BP is soft. Hold lisinopril-hydrochlorothiazide   3. DM-2: PTA diet controlled. BG likely will run high with surgery. She got 8 mg decadran. So we will start her on sliding scale insulin.   4. Vit D def and Osteoporosis: On Vit D supplements 60057 units every week  5. R&Y Gastric Bypass: Avoid NSAIDs. She was on PPI for long time. Recently has been stopped. She is using OTC Zantac which seems to work. She takes it every other day. We will schedule it here.   6. Hyponatremia: it could be due to her hydrochlorothiazide or  Low volume or SIADH. She is currently on half NS as well. Stop that. Switch to NS. Check Na in 4 hrs to see where we going. If worsening, that could imply SIADH. We will manage it accordingly at that time.   7. Anxiety/Depression: Ct paxil    DVT Prophylaxis: Defer to primary service  Code Status: No Order    Disposition: Defer to primary     Mathieu Pacheco MD     Reason for Consult      Reason for consult: I was asked by Dr Charles to evaluate this patient for Medical management    Primary Care Physician      Mary Ellen Salgado    Chief Complaint     Sp spine surgery    History is obtained from the patient    History of Present Illness      Yun Sagastume is a 54 year old female with SP L4-L5, L5S1 Fusion, HT, DM-2, Vit D def and Osteoporosis, R&Y Gastric Bypass, Depression/Anxiety admitted after  L5-S1 oblique lumbar intradiscal fusion (OLIF), L4-L5 oblique lumbar intradiscal fusion (OLIF) by Dr Charles on 07/29/19. EBL was 1300, She got back roughly 500 ml.  Post op she was hypotensive.    She is otherwise doing  okay. No dizziness, no chest pain or sob. No cough or phlegm. No fevers or chills.      Past Medical History    I have reviewed this patient's medical history and updated it with pertinent information if needed.   Past Medical History:   Diagnosis Date     Colon polyp      Depression      Diabetes (H)     Type 2     Gastric bypass status for obesity      HTN (hypertension)      Osteopenia      Post laminectomy syndrome        Past Surgical History   I have reviewed this patient's surgical history and updated it with pertinent information if needed.  Past Surgical History:   Procedure Laterality Date     INJECT EPIDURAL TRANSFORAMINAL LUMBAR / SACRAL SINGLE Bilateral 8/17/2017    Procedure: INJECT EPIDURAL TRANSFORAMINAL LUMBAR / SACRAL SINGLE;  Bilateral lumbar 4 selective nerve root blocks;  Surgeon: Ney Toure MD;  Location: [facility] OR     INJECT EPIDURAL TRANSFORAMINAL LUMBAR / SACRAL SINGLE Bilateral 8/30/2017    Procedure: INJECT EPIDURAL TRANSFORAMINAL LUMBAR / SACRAL SINGLE;  Lumbar 5 Nerve Root Block, Bilateral;  Surgeon: Pedrito Carr MD;  Location: [facility] OR       Prior to Admission Medications   Prior to Admission Medications   Prescriptions Last Dose Informant Patient Reported? Taking?   PARoxetine (PAXIL) 40 MG tablet 7/29/2019 at 0430  Yes Yes   Sig: Take 60 mg by mouth every morning    acetaminophen (TYLENOL) 500 MG tablet 7/28/2019 at 2000  Yes Yes   Sig: Take 1,000 mg by mouth every 6 hours as needed for mild pain   cyclobenzaprine (FLEXERIL) 10 MG tablet 7/28/2019 at 2100  Yes Yes   Sig: Take 10 mg by mouth 2 times daily as needed    hydrOXYzine (VISTARIL) 25 MG capsule 7/28/2019 at 0800  Yes Yes   Sig: Take 25 mg by mouth 2 times daily as needed    lisinopril-hydrochlorothiazide (PRINZIDE/ZESTORETIC) 10-12.5 MG per tablet 7/28/2019 at 0800  Yes Yes   Sig: Take 1 tablet by mouth daily   mupirocin (BACTROBAN) 2 % external ointment 7/29/2019 at 0430  Yes Yes   oxyCODONE IR  (ROXICODONE) 10 MG tablet 7/28/2019 at 1600  Yes Yes   Sig: Take 10 mg by mouth every 6 hours as needed for severe pain   ranitidine (ZANTAC) 150 MG tablet 7/28/2019 at 0800  Yes Yes   Sig: Take 150 mg by mouth 2 times daily as needed for heartburn   vitamin D2 (ERGOCALCIFEROL) 48549 units (1250 mcg) capsule 7/26/2019  No No   Sig: Take 1 capsule (50,000 Units) by mouth once a week   Patient taking differently: Take 50,000 Units by mouth once a week Fridays      Facility-Administered Medications: None     Allergies   Allergies   Allergen Reactions     Aspirin      Codeine Nausea     20 years ago     Nsaids      Amoxicillin Rash       Social History   I have reviewed this patient's social history and updated it with pertinent information if needed. Yun Sagastume  reports that she quit smoking about 15 years ago. Her smoking use included cigarettes. She has never used smokeless tobacco.    Family History   I have reviewed this patient's family history and updated it with pertinent information if needed.   History reviewed. No pertinent family history.    Review of Systems   The 10 point Review of Systems is negative other than noted in the HPI or here.       Physical Exam      Temp: 98.5  F (36.9  C) Temp src: Oral BP: 105/65 Pulse: 76 Heart Rate: 83 Resp: 13 SpO2: 99 % O2 Device: Nasal cannula Oxygen Delivery: 1 LPM  Vital Signs with Ranges  Temp:  [97.5  F (36.4  C)-98.5  F (36.9  C)] 98.5  F (36.9  C)  Pulse:  [] 76  Heart Rate:  [] 83  Resp:  [8-24] 13  BP: ()/(65-86) 105/65  MAP:  [69 mmHg-86 mmHg] 72 mmHg  Arterial Line BP: (100-127)/(54-68) 110/56  SpO2:  [92 %-100 %] 99 %  198 lbs 13.68 oz    Constitutional: Awake, alert, cooperative, no apparent distress.  Eyes: Conjunctiva and pupils examined and normal.  HEENT: Moist mucous membranes, normal dentition.  Respiratory: Clear to auscultation bilaterally, no crackles or wheezing.  Cardiovascular: Regular rate and rhythm, normal S1 and S2,  and no murmur noted.  GI: Soft, non-distended, non-tender, normal bowel sounds.  Lymph/Hematologic: No anterior cervical or supraclavicular adenopathy.  Skin: No rashes, no cyanosis, no edema.  Musculoskeletal: SP lumbar spine surgery  Neurologic: Cranial nerves 2-12 intact, normal strength and sensation.  Psychiatric: Alert, oriented to person, place and time, no obvious anxiety or depression.    Data   -Data reviewed today: All pertinent laboratory and imaging results from this encounter were reviewed. I personally reviewed no images or EKG's today.  Recent Labs   Lab 07/29/19  1518 07/29/19  1230   HGB 11.5* 10.8*   * 130*   POTASSIUM 4.8 4.3   * 202*       Recent Results (from the past 24 hour(s))   XR Surgery LION L/T 5 Min Fluoro w Stills    Narrative    Exam: TEMPORARY, 7/29/2019 8:31 AM    Indication: Anterior lumbar interbody fusion L4-5 L5-S1     Comparison: MRI lumbar spine 2/28/2019    Findings:   Single lateral image of the lumbosacral spine acquired at 07:49:22  on7/29/2019 demonstrates a linear probe projecting over the L5-S1  intervertebral disc space.    Subsequent images demonstrate anterior approach intervertebral body  spacers at L4-5 and L5-1.      Impression    Impression: Intraoperative image for spinal enumeration.    I have personally reviewed the examination and initial interpretation  and I agree with the findings.    ANT NEWMAN MD   XR Surgery LION Fluoro L/T 5 Min    Narrative    This exam was marked as non-reportable because it will not be read by a   radiologist or a [facility] non-radiologist provider.

## 2019-07-30 NOTE — PROGRESS NOTES
Oncall ortho resident notified :   Pt reported new numbness in bilateral anterior thigh since in the morning.  No other new weakness or CMS/Neuros change.  Up to bathroom with A1 and walker.  Voided urine with retention of 260ml after jamison removed.     Order : continue to monitor. Pt will be seen by ortho tomorrow morning

## 2019-07-30 NOTE — PROGRESS NOTES
"Orthopaedic Surgery Progress Note:  07/30/2019     Subjective:   Postoperative day 1.  No acute issues overnight.  Pain has been well controlled.  Family at bedside.  Patient has been able to tolerate some liquid diet, no solids yet.  She has not been able to stand up.  She still has a Schuster in place which will be of this morning.  Denies chest pain, shortness of breath, fevers or chills.    Objective:   BP 90/48 (BP Location: Left arm)   Pulse 86   Temp 98.5  F (36.9  C) (Oral)   Resp 12   Ht 1.727 m (5' 8\")   Wt 90.2 kg (198 lb 13.7 oz)   SpO2 97%   BMI 30.24 kg/m    I/O this shift:  In: 1187.5 [P.O.:150; I.V.:1037.5]  Out: 225 [Urine:225]  Gen: NAD. Resting comfortably in bed  Resp: Breathing comfortably on RA  Drain:   Deep drain output of 5/5 at 24/7 hours, respectively.  Musculoskeletal: Dressings anteriorly and posteriorly are clean dry and intact, no evidence of erythema fluctuance or drainage.  The abdomen is soft without evidence of peritoneal signs.  Very small amount of sanguinous fluid in drain.      Lower extremities:  Motor Strength Right Left   Hip flexion: L1, L2, L3 5/5 5/5   Hip adduction: L2, L3 5/5 5/5   Knee flexion: S1 5/5 5/5   Knee extension: L3, L4 5/5 5/5   Ankle dosiflexion: L4, L5 5/5 5/5   EHL: L5 5/5 5/5   Ankle plantarflexion: S1 5/5 5/5     Sensation from L1-S2 is preserved.    Labs:  Lab Results   Component Value Date    WBC 5.0 07/10/2019    HGB 9.8 (L) 07/29/2019     07/10/2019        Assessment & Plan:   Yun Sagastume is a 54 year old female with lumbar spine spondylosis and saggital malalignment, s/p L4-5 and L5-S1 ALIF as well as PLIF during a two stage procedure with Dr Carrion on 7/30/2019.    Orthopaedics Primary  Activity: Up with assist until independent. No excessive bending or twisting. No lifting >10 lbs x 6 weeks. No Shelia lift for transfers.   Weight bearing status: WBAT.  Pain management: Transition from IV to PO as tolerated. No NSAIDs "   Antibiotics: Ancef for 24 hrs  Diet: NPO until patient has bowel sounds, the begin with clear fluids and progress diet as tolerated.   DVT prophylaxis: SCDs only. No chemical DVT ppx needed.  Imaging: XR Upright  PTDC - ordered.  Labs: labs PRN  Bracing/Splinting: None.  Dressings: Keep Aquacel c/d/i x 7 days.  Drains: Document output per shift, will be discontinued at Orthopedic Surgery discretion.  Schuster catheter: Remove POD#1.   Physical Therapy/Occupational Therapy: Eval and treat.  Consults: Hospitalist  Follow-up: Clinic with Dr. Carrion in 6 weeks with repeat x-rays.   Disposition: Pending progress with therapies, pain control on orals, and medical stability.    Orthopaedics surgery staff for this patient is Dr. Carrion.    ------------------------------------------------------------------------------------------    [   ] Discontinue PCA  [   ] Drains removed.  [   ] Post xrays done.  [   ] Discharge orders done.  [x] Discharge summary started.    Jose Jones MD  Orthopaedic Surgery, PGY-4  Pager: [phone]        FOLLOWUP:    Future Appointments   Date Time Provider Department Center   7/30/2019 11:00 AM Tabby Barakat Pt, PT [facility] [place]   7/30/2019  3:30 PM Tabby Barakat Pt, PT [facility] [place]   7/31/2019  7:30 AM Kimberly Gustafson OT [facility] [place]   9/5/2019 10:30 AM Jeremiah Carrion MD [facility]   10/29/2019 10:30 AM Jeremiah Carrion MD [facility]

## 2019-07-30 NOTE — DISCHARGE SUMMARY
[facility], [place]  Orthopaedic Discharge Summary    Patient: Yun Sagastume MRN# [id number]   Age/Sex: 54 year old female YOB: 1965      Date of Admission:  7/29/2019  Date of Discharge:  8/6/2019  1:20 PM  Admitting Physician:  Jeremiah Carrion*  Discharge Physician:  Jose Rodriguez MD  Primary Care Provider:  Mary Ellen Salgado  Discharge location         Home with home aid.    DISCHARGE DIAGNOSIS:  Acquired Kyphosis, Lumbar Spondylosis, Lumbar Radicular Pain    PROCEDURE(S):   7/29/2019 Procedure(s):  (lateral) Anterior Lumbar Interbody Fusion Lumbar 4-5, Lumbar 5-Sacral 1: Use Of Infuse BMP (Medium Kit)  (prone) Steatlh Assisted Posterior Instrumented Spinal Fusion Lumbar 3-Pelvis, Transforaminal Lumbar Interbody Fusion L3-L4. (Right Facetectomy), Smith Carpio Osteotomy Lumbar 4-5, Lumbar 5-Sacral 1: Cement Augmentation Of Screws L3-L4     BRIEF HISTORY:    Copied from Dr Llamas's note from 2/26/2019    54/f, previously followed by Dr. Tim; last seen 8/3/17.  Seeing me for 1st time today because dr. Tim is on medical leave.     2011 decompression surgery L4-S1.  Helped some at the time, but over time sxs got progressively worse again.     Back 50%, Leg 50%,  Right > Left.  Low back pain radiating to R leg, occasionally to left side.  Right buttock, anterior thigh, knee, skips the calf, then with tingling/burning of bottom of foot/toes.  Also having some upper back (between shoulder blades) pain.  Worse: going up and down stairs; any movement/activity; carrying/lifting.  Sitting ~ standing.  Cannot maintain 1 position for long time (15-20 mins).    Better: Switching positions.  Pain meds, ice packs.  (-) shopping cart.      HOSPITAL COURSE:    Postoperatively the patient was admitted to the orthopedic surgery service.  Medicine was consulted to help with management of medical comorbidities.  Intravenous antibiotics were provided for 24 hours  after the surgery. Patient complained of bilateral thigh numbness, but no weakness with leg muscles. Patient received routine nursing cares on the floor.  A clear liquid diet was started and subsequently advance to regular, which the patient is tolerating without issue.  Stool softeners were administered while taking pain medications to prevent constipation.  The  patient was able to void independently.  Post operative xrays  were obtained and it was noted that one of the cages anteriorly had partially extruded. A CT scan was obtained and showed no evidence of vascular compromise. Pulses along DP and PT were present bilaterally.  It was discussed with the patient that she will require a revision surgery to reposition the extruded cage.  The decision was made to perform the surgery during a later admission due to the need for a vascular surgery access surgeon.   PT/OT was initiated for safety training, ADLs, mobility and ROM and the patient was ambulating safetly.  Surgical drains were removed on 3, after output was minimal.  After demonstrating the ability to tolerate a diet, pain control on PO pain meds and clearance by PT/OT, the patient was deemed medically safe for discharge to her home.    FOLLOW UP:      Future Appointments   Date Time Provider Department Center   7/30/2019 11:00 AM Tabby Barakat Pt, PT [facility] [place]   7/30/2019  3:30 PM Tabby Barakat Pt, PT [facility] [place]   7/31/2019  7:30 AM Kimberly Gustafson OT [facility] [place]   9/5/2019 10:30 AM Jeremiah Carrion MD [facility]   10/29/2019 10:30 AM Jeremiah Carrion MD [facility] ORTHOPEDICS - Located 4th Floor (4D ) in the Clinics and Surgery Center at [address] [place] [address].  parking is very convenient and highly recommended.  is a flat rate fee. Both  and self parkers should enter the main arrival plaza from [facility]; parking attendants will direct you based on your parking  preference.     Department Address: [address] [place] [id number]     [facility] Orthopedic Scheduling contact number: [phone]    Call if you haven't heard regarding these appointments within 7 days of discharge.      PHYSICAL EXAMINATION:  Gen: NAD. Resting comfortably in bed  Resp: Breathing comfortably on RA  Drain: Removed 8/2  Musculoskeletal: dressing on the back is c/d/i. Anterior incision is benign. No evidence of erythema fluctuance or drainage.  The abdomen is soft without evidence of peritoneal signs.  DP/PT 2+ and symmetric.      Lower extremities:  Motor Strength Right Left   Hip flexion: L1, L2, L3 5/5 5/5   Hip adduction: L2, L3 5/5 5/5   Knee flexion: S1 5/5 5/5   Knee extension: L3, L4 4/5 4/5   Ankle dosiflexion: L4, L5 5/5 5/5   EHL: L5 5/5 5/5   Ankle plantarflexion: S1 5/5 5/5      Sensation from L1-S2 is preserved.             PLANNED DISCHARGE ORDERS:          Discharge Procedure Orders   Home care nursing referral   Referral Priority: Routine Referral Type: Home Health Therapies & Aides   Number of Visits Requested: 1     Home Care PT Referral for Hospital Discharge   Referral Priority: Routine Referral Type: Home Health Therapies & Aides   Number of Visits Requested: 1     Reason for your hospital stay   Order Comments: Spine surgery     Follow Up and recommended labs and tests   Order Comments: Follow up your treatment team in 6 weeks, please call to schedule this appointment.     Activity   Order Comments: Please, refrain from excessive twisting, bending, or lifting with the waist.  You have a 10 pound weightbearing limit restriction.     Order Specific Question Answer Comments   Is discharge order? Yes      When to contact your care team   Order Comments: DISCHARGE INSTRUCTIONS:  Activity:   - You may weight bear as tolerated.  - Please avoid lifting >10 lbs, excessive bending, twisting, and strenuous activities.  Pain management:   - Please take  pain medications as instructed, but it is okay to begin weaning off of them as your pain tolerates. Avoid driving while taking pain medications as they can make you drowsy.  Wound care:   - Your dressing is to remain in place until postoperative day 5 after which you may remove.   - Your stitches will dissolve on their own and do not need to be removed.   - You may shower once your dressing is off. Let water run over the incision and dab dry -- do not rub or submerge the incision under water for at least 3 weeks.   - After the dressing is off, you may leave it open to the air. You may continue to keep the incision covered for protection.   - Please contact us if there is increased drainage, swelling, pain, or redness stemming from your incision. This may be a sign of infection and would need to be looked at immediately.  New or worsening symptoms:  - Please call or seek medical attention for pain that continues to worsen, new onset of numbness or tingling, or extreme swelling.  - Please see a medical provider for new onset of chest pain or shortness of breath. This may be a sign of a heart attack or blood clot in your lungs.   Follow-up:  - After discharge, an appointment will be made for you to return to clinic in approximately 6 weeks for a postoperative check with your surgeon.  - Please call [phone] if you have any questions or concerns.  - Appointments are at [facility]: [address] [place] [id number]     Wound care and dressings   Order Comments: Please keep wound covered first 5 days after surgery.  Afterwards wound can be left open to air.  Once the dressing is removed you can try, but no water submersion for first 3 weeks.    If you have a waterproof dressing, such as Aquacel, you can shower at any time.  No water submersion.     Discharge Instructions   Order Comments: You will follow-up in clinic in 6 weeks     MD face to face encounter   Order  Comments: Documentation of Face to Face and Certification for Home Health Services    I certify that patient: Yun Sagastume is under my care and that I, or a nurse practitioner or physician's assistant working with me, had a face-to-face encounter that meets the physician face-to-face encounter requirements with this patient on: 8/2/2019.    which is the primary reason for home health care: Spinal Fusion    I certify that, based on my findings, the following services are medically necessary home health services: Physical Therapy and Skilled Nursing.     My clinical findings support the need for the above services because:  Nursing to assess vital signs and weight, respiratory and cardiac status, pain level and activity tolerance, incision for signs/symptoms of infection, hydration, nutrition and home safety. Physical Therapy to progress safety and independence with functional mobility.    Further, I certify that my clinical findings support that this patient is homebound (i.e. absences from home require considerable and taxing effort and are for medical reasons or [facility] or infrequently or of short duration when for other reasons) because: Requires assistance of another person or specialized equipment to access medical services because patient: Has range of motion limitations prevents ability to exit home safely. and requires supervision of another for safe transfer. Patient unable to drive.    Based on the above findings. I certify that this patient is confined to the home and needs intermittent skilled nursing care, physical therapy and/or speech therapy.  The patient is under my care, and I have initiated the establishment of the plan of care.  This patient will be followed by a physician who will periodically review the plan of care.  Physician/Provider to provide follow up care: Mary Ellen Salgado    Attending hospital physician (the Medicare certified AIDA provider): Jeremiah Carrion  Jaime*  Physician Signature: See electronic signature associated with these discharge orders.  Date: 8/2/2019     Full Code     Order Specific Question Answer Comments   Code status determined by: Discussion with patient/legal decision maker      Diet   Order Comments: Follow this diet upon discharge: regular diet     Order Specific Question Answer Comments   Is discharge order? Yes            Jose Jones MD  Orthopaedic Surgery, PGY-4

## 2019-07-30 NOTE — PLAN OF CARE
Nurisng  post op spine  S- States pain is tolerable, soreness,  states is feeling very tired r/t poor sleep pre op and last nite.   Pt is very dozey/sleepy, falls alseep easliy even while holding a cup of fluid to drink.  PCA 3.3mg total   states pain control is fair.   Toelrating clear liq diet, decreased bowel sounds, not passing gas   discussed to keep diet to cleqrs until + bowels sounds good and passig flatus.  Pt aggrees.   Abd incsion drsg off by MD- site intact.  Back drsg intact.  Has numbness bialt thigh- likley r/t postionign in or.  Reported to LEXY Parker NP.   Schuster cath out at 1430.   HV 40cc dk red  IV to TKO w PCA  A- stable  pain control seems ok - pt very sleepy  R- up to void, cont clear liq cont PCA

## 2019-07-31 ENCOUNTER — APPOINTMENT (OUTPATIENT)
Dept: OCCUPATIONAL THERAPY | Facility: CLINIC | Age: 54
End: 2019-07-31
Attending: ORTHOPAEDIC SURGERY
Payer: COMMERCIAL

## 2019-07-31 ENCOUNTER — APPOINTMENT (OUTPATIENT)
Dept: PHYSICAL THERAPY | Facility: CLINIC | Age: 54
End: 2019-07-31
Attending: ORTHOPAEDIC SURGERY
Payer: COMMERCIAL

## 2019-07-31 LAB — GLUCOSE BLDC GLUCOMTR-MCNC: 160 MG/DL (ref 70–99)

## 2019-07-31 PROCEDURE — 99232 SBSQ HOSP IP/OBS MODERATE 35: CPT | Performed by: INTERNAL MEDICINE

## 2019-07-31 PROCEDURE — 25000128 H RX IP 250 OP 636: Performed by: NURSE PRACTITIONER

## 2019-07-31 PROCEDURE — 25000132 ZZH RX MED GY IP 250 OP 250 PS 637: Performed by: NURSE PRACTITIONER

## 2019-07-31 PROCEDURE — 97165 OT EVAL LOW COMPLEX 30 MIN: CPT | Mod: GO

## 2019-07-31 PROCEDURE — 99207 ZZC NO CHARGE FIRST FOLLOW UP PS: CPT

## 2019-07-31 PROCEDURE — 12000001 ZZH R&B MED SURG/OB UMMC

## 2019-07-31 PROCEDURE — 25000132 ZZH RX MED GY IP 250 OP 250 PS 637: Performed by: PHYSICIAN ASSISTANT

## 2019-07-31 PROCEDURE — 97535 SELF CARE MNGMENT TRAINING: CPT | Mod: GO

## 2019-07-31 PROCEDURE — 97530 THERAPEUTIC ACTIVITIES: CPT | Mod: GO

## 2019-07-31 PROCEDURE — 97530 THERAPEUTIC ACTIVITIES: CPT | Mod: GP | Performed by: PHYSICAL THERAPIST

## 2019-07-31 PROCEDURE — 00000146 ZZHCL STATISTIC GLUCOSE BY METER IP

## 2019-07-31 PROCEDURE — 25000132 ZZH RX MED GY IP 250 OP 250 PS 637: Performed by: STUDENT IN AN ORGANIZED HEALTH CARE EDUCATION/TRAINING PROGRAM

## 2019-07-31 PROCEDURE — 97116 GAIT TRAINING THERAPY: CPT | Mod: GP | Performed by: PHYSICAL THERAPIST

## 2019-07-31 RX ORDER — HYDROMORPHONE HYDROCHLORIDE 1 MG/ML
.3-.5 INJECTION, SOLUTION INTRAMUSCULAR; INTRAVENOUS; SUBCUTANEOUS
Status: DISCONTINUED | OUTPATIENT
Start: 2019-07-31 | End: 2019-08-05

## 2019-07-31 RX ORDER — NALOXONE HYDROCHLORIDE 0.4 MG/ML
.1-.4 INJECTION, SOLUTION INTRAMUSCULAR; INTRAVENOUS; SUBCUTANEOUS
Status: DISCONTINUED | OUTPATIENT
Start: 2019-07-31 | End: 2019-08-06 | Stop reason: HOSPADM

## 2019-07-31 RX ORDER — LIDOCAINE 4 G/G
1-3 PATCH TOPICAL
Status: DISCONTINUED | OUTPATIENT
Start: 2019-07-31 | End: 2019-08-06 | Stop reason: HOSPADM

## 2019-07-31 RX ORDER — ACETAMINOPHEN 325 MG/1
975 TABLET ORAL EVERY 8 HOURS
Status: DISCONTINUED | OUTPATIENT
Start: 2019-07-31 | End: 2019-08-06 | Stop reason: HOSPADM

## 2019-07-31 RX ADMIN — HYDROMORPHONE HYDROCHLORIDE 4 MG: 2 TABLET ORAL at 14:22

## 2019-07-31 RX ADMIN — HYDROMORPHONE HYDROCHLORIDE 0.3 MG: 1 INJECTION, SOLUTION INTRAMUSCULAR; INTRAVENOUS; SUBCUTANEOUS at 13:12

## 2019-07-31 RX ADMIN — SENNOSIDES AND DOCUSATE SODIUM 2 TABLET: 8.6; 5 TABLET ORAL at 19:34

## 2019-07-31 RX ADMIN — HYDROXYZINE HYDROCHLORIDE 25 MG: 25 TABLET ORAL at 17:09

## 2019-07-31 RX ADMIN — HYDROMORPHONE HYDROCHLORIDE 4 MG: 2 TABLET ORAL at 08:28

## 2019-07-31 RX ADMIN — HYDROMORPHONE HYDROCHLORIDE 0.5 MG: 1 INJECTION, SOLUTION INTRAMUSCULAR; INTRAVENOUS; SUBCUTANEOUS at 16:24

## 2019-07-31 RX ADMIN — RANITIDINE 150 MG: 150 TABLET ORAL at 08:29

## 2019-07-31 RX ADMIN — HYDROMORPHONE HYDROCHLORIDE 4 MG: 2 TABLET ORAL at 11:39

## 2019-07-31 RX ADMIN — DOCUSATE SODIUM 200 MG: 100 CAPSULE, LIQUID FILLED ORAL at 19:34

## 2019-07-31 RX ADMIN — LIDOCAINE 2 PATCH: 560 PATCH PERCUTANEOUS; TOPICAL; TRANSDERMAL at 11:03

## 2019-07-31 RX ADMIN — HYDROMORPHONE HYDROCHLORIDE 4 MG: 2 TABLET ORAL at 20:46

## 2019-07-31 RX ADMIN — CYCLOBENZAPRINE HYDROCHLORIDE 10 MG: 5 TABLET, FILM COATED ORAL at 15:14

## 2019-07-31 RX ADMIN — DOCUSATE SODIUM 200 MG: 100 CAPSULE, LIQUID FILLED ORAL at 08:29

## 2019-07-31 RX ADMIN — HYDROMORPHONE HYDROCHLORIDE 0.5 MG: 1 INJECTION, SOLUTION INTRAMUSCULAR; INTRAVENOUS; SUBCUTANEOUS at 19:34

## 2019-07-31 RX ADMIN — POLYETHYLENE GLYCOL 3350 17 G: 17 POWDER, FOR SOLUTION ORAL at 08:29

## 2019-07-31 RX ADMIN — GABAPENTIN 300 MG: 100 CAPSULE ORAL at 08:28

## 2019-07-31 RX ADMIN — RANITIDINE 150 MG: 150 TABLET ORAL at 19:34

## 2019-07-31 RX ADMIN — PAROXETINE HYDROCHLORIDE HEMIHYDRATE 60 MG: 20 TABLET, FILM COATED ORAL at 08:29

## 2019-07-31 RX ADMIN — ACETAMINOPHEN 975 MG: 325 TABLET, FILM COATED ORAL at 02:47

## 2019-07-31 RX ADMIN — SENNOSIDES AND DOCUSATE SODIUM 2 TABLET: 8.6; 5 TABLET ORAL at 08:28

## 2019-07-31 RX ADMIN — POLYETHYLENE GLYCOL 3350 17 G: 17 POWDER, FOR SOLUTION ORAL at 19:34

## 2019-07-31 RX ADMIN — HYDROMORPHONE HYDROCHLORIDE 4 MG: 2 TABLET ORAL at 17:40

## 2019-07-31 RX ADMIN — ACETAMINOPHEN 975 MG: 325 TABLET, FILM COATED ORAL at 17:09

## 2019-07-31 ASSESSMENT — ACTIVITIES OF DAILY LIVING (ADL)
ADLS_ACUITY_SCORE: 14

## 2019-07-31 ASSESSMENT — PAIN DESCRIPTION - DESCRIPTORS
DESCRIPTORS: BURNING
DESCRIPTORS: ACHING;SORE

## 2019-07-31 NOTE — PROGRESS NOTES
07/31/19 0700   Quick Adds   Type of Visit Initial Occupational Therapy Evaluation   Living Environment   Lives With child([place]), adult   Living Arrangements house   Home Accessibility stairs to enter home   Number of Stairs, Main Entrance 2   Stair Railings, Main Entrance none   Transportation Anticipated family or friend will provide   Living Environment Comment Has standard toilet. Tub/shower, no grab bars or shower chair. Lives with son who can assist in evening, works during day. daughter lives nearby but also works during day.    Self-Care   Usual Activity Tolerance moderate   Current Activity Tolerance fair   Regular Exercise No   Equipment Currently Used at Home none   Functional Level   Ambulation 0-->independent   Transferring 0-->independent   Toileting 0-->independent   Bathing 0-->independent   Dressing 0-->independent   Eating 0-->independent   Communication 0-->understands/communicates without difficulty   Swallowing 0-->swallows foods/liquids without difficulty   Cognition 0 - no cognition issues reported   Fall history within last six months no   General Information   Onset of Illness/Injury or Date of Surgery - Date 07/29/19   Referring Physician Dr. Carrion   Patient/Family Goals Statement Less pain   Additional Occupational Profile Info/Pertinent History of Current Problem Yun Sagastume is a 54 year old female with lumbar spine spondylosis and saggital malalignment, s/p L4-5 and L5-S1 ALIF as well as PLIF during a two stage procedure with Dr Carrion on 7/30/2019.   Precautions/Limitations fall precautions;spinal precautions   Weight-Bearing Status - LUE   (no lifting >10 lbs)   Weight-Bearing Status - RUE   (no lifting >10 lbs)   Weight-Bearing Status - LLE weight-bearing as tolerated   Weight-Bearing Status - RLE weight-bearing as tolerated   Cognitive Status Examination   Cognitive Comment no concerns   Visual Perception   Visual Perception Wears glasses   Sensory Examination   Sensory  Comments new numbness in B anterior thighs, goes down to knees. MD aware.    Pain Assessment   Patient Currently in Pain Yes, see Vital Sign flowsheet   Range of Motion (ROM)   ROM Comment B UE's WFL   Strength   Strength Comments not formally tested secondary to precautions, deconditioning consistent with post op   Muscle Tone Assessment   Muscle Tone Quick Adds No deficits were identified   Coordination   Upper Extremity Coordination No deficits were identified   Mobility   Bed Mobility Comments Supine<>sit using log roll technique with overall min A   Transfer Skill: Bed to Chair/Chair to Bed   Level of [place]: Bed to Chair contact guard   Physical Assist/Nonphysical Assist: Bed to Chair set-up required;verbal cues   Weight-Bearing Restrictions weight-bearing as tolerated   Assistive Device - Transfer Skill Bed to Chair Chair to Bed Rehab Eval rolling walker   Transfer Skill: Sit to Stand   Level of [place]: Sit/Stand contact guard   Physical Assist/Nonphysical Assist: Sit/Stand set-up required;verbal cues   Transfer Skill: Sit to Stand weight-bearing as tolerated   Assistive Device for Transfer: Sit/Stand rolling walker   Transfer Skill: Toilet Transfer   Level of [place]: Toilet minimum assist (75% patients effort)   Physical Assist/Nonphysical Assist: Toilet set-up required;verbal cues   Weight-Bearing Restrictions: Toilet weight-bearing as tolerated   Assistive Device rolling walker;grab bars   Toilet Transfer Skill Comments lower toilet   Lower Body Dressing   Level of [place]: Dress Lower Body maximum assist (25% patients effort)   Physical Assist/Nonphysical Assist: Dress Lower Body set-up required;verbal cues   Toileting   Level of [place]: Toilet minimum assist (75% patients effort)   Physical Assist/Nonphysical Assist: Toilet set-up required;verbal cues   Grooming   Level of [place]: Grooming stand-by assist   Physical Assist/Nonphysical Assist: Grooming set-up required  "  Eating/Self Feeding   Level of [place]: Eating independent   Activities of Daily Living Analysis   Impairments Contributing to Impaired Activities of Daily Living balance impaired;pain;post surgical precautions;sensation decreased;ROM decreased;strength decreased   General Therapy Interventions   Planned Therapy Interventions ADL retraining;transfer training   Clinical Impression   Criteria for Skilled Therapeutic Interventions Met yes, treatment indicated   OT Diagnosis Decreased functional mobility and ADLs   Influenced by the following impairments pain, post op precautions, decreased strength, numbness in thighs   Assessment of Occupational Performance 3-5 Performance Deficits   Identified Performance Deficits dressing,bathing, toileting, transfers, home mgmt   Clinical Decision Making (Complexity) Low complexity   Therapy Frequency Daily   Predicted Duration of Therapy Intervention (days/wks) 3 days   Anticipated Equipment Needs at Discharge dressing equipment;shower chair;raised toilet seat  (TBD)   Anticipated Discharge Disposition Transitional Care Facility;Home with Assist  (pending progress and LOS)   Risks and Benefits of Treatment have been explained. Yes   Patient, Family & other staff in agreement with plan of care Yes   [facility] TM \"6 Clicks\"   2016, Trustees of [facility], under license to [employer].  All rights reserved.   6 Clicks Short Forms Daily Activity Inpatient Short Form   [facility]  \"6 Clicks\" Daily Activity Inpatient Short Form   1. Putting on and taking off regular lower body clothing? 2 - A Lot   2. Bathing (including washing, rinsing, drying)? 2 - A Lot   3. Toileting, which includes using toilet, bedpan or urinal? 3 - A Little   4. Putting on and taking off regular upper body clothing? 3 - A Little   5. Taking care of personal grooming such as brushing teeth? 4 - None   6. Eating meals? 4 - None   Daily Activity Raw Score (Score out of " 24.Lower scores equate to lower levels of function) 18   Total Evaluation Time   Total Evaluation Time (Minutes) 8

## 2019-07-31 NOTE — PLAN OF CARE
Discharge Planner OT   Patient plan for discharge: thinking about TCU, does not have much assist at home as son and daughter work during the day.   Current status: Patient limited by pain and fatigue this am. Reports new numbness in B anterior thighs, having more difficulty moving legs. Needs assist with bed mobility. Patient ambulated to/from bathroom using FWW with CGA, moving slowly. Min A for toilet transfer from low toilet. Patient declined sitting in chair as too low, will try to find spine chair and encourage patient up in chair for meals. O2 on RA following activity 98%.   Barriers to return to prior living situation: pain, post op precautions, limited assist at home  Recommendations for discharge: at this time TCU, depending on LOS patient may progress to discharge home however needs to be mod IND  Rationale for recommendations: will continue with OT to maximize safety and IND with functional mobility and ADLs/IADLs       Entered by: DEEPA BRADY HELD 07/31/2019 8:17 AM

## 2019-07-31 NOTE — PROGRESS NOTES
[facility]    Medicine Progress Note - Hospitalist Service       Date of Admission:  7/29/2019  Assessment & Plan         1. SP L4-L5, L5S1 Fusion, POD#2  -Post operative mild hypotension now resolved. Stop IV fluids   - Perioperative blood loss at 1300 mls. Post op Hgb at 9.1.    -transfuse for Hb less than 7\- Has known bilateral numbness in her thighs. Primary team aware  PCA pump stopped today ( 7/31). Transition to oral pain medications   - Remainder of the management as per primary team      2. HT:   Hold lisinopril-hydrochlorothiazide as patient continues to have relative asymptomatic hypotension     3. DM-2:   PTA diet controlled.  She got 8 mg decadran.   Given stable blood sugars, Stopped sliding scale insulin and limit blood sugar checks to twice daily  Stable blood sugars     4. Vit D def and Osteoporosis:   On Vit D supplements 42487 units every week    5. R&Y Gastric Bypass:   Avoid NSAIDs. She was on PPI for long time. Recently has been stopped. She is using OTC Zantac which seems to work. She takes it every other day. We will schedule it here.     6. Hyponatremia: ( Resolved)   Likely due to hypovolemia, which resolved with use of IV fluids     7. Anxiety and Depression:   Stable   Continue  paxil        Diet: Advance Diet as Tolerated: Regular Diet Adult  Diet    DVT Prophylaxis: Pneumatic Compression Devices  Schuster Catheter: not present  Code Status: Full Code      Disposition Plan   Expected discharge: Per primary team   Entered: Riaz Spear MD 07/31/2019, 11:20 AM       The patient's care was discussed with the Bedside Nurse, Care Coordinator/, Patient and Primary team.    Riaz Spear MD  Hospitalist Service  [facility]    ______________________________________________________________________    Interval History   Yun feels well this morning  Says that her pain is  adequately controlled   No fever or chills  Says that she slept well  No chest pain/ SOB     Data reviewed today: I reviewed all medications, new labs and imaging results over the last 24 hours. I personally reviewed no images or EKG's today.    Physical Exam   Vital Signs: Temp: 97.5  F (36.4  C) Temp src: Oral BP: 107/67 Pulse: 83 Heart Rate: 96 Resp: 16 SpO2: 98 % O2 Device: Nasal cannula Oxygen Delivery: 1 LPM  Weight: 198 lbs 13.68 oz  General Appearance: Awake, alert and not in distress  Respiratory: Normal breath sounds bilaterally   Cardiovascular: Normal heart sounds   GI: Soft, non tender. Normal bowel sounds   Skin: No bruising / bleeding   Other: Drain present. Moving both legs with symmetric strength and power     Data   Recent Labs   Lab 07/30/19  0638 07/29/19  2206 07/29/19  2048 07/29/19  1518 07/29/19  1230   WBC 8.1  --   --   --   --    HGB 9.1* 9.8*  --  11.5* 10.8*   MCV 89  --   --   --   --      --   --   --   --      --  134 129* 130*   POTASSIUM 4.2  --   --  4.8 4.3   CHLORIDE 102  --   --   --   --    CO2 28  --   --   --   --    BUN 15  --   --   --   --    CR 0.97  --   --   --   --    ANIONGAP 5  --   --   --   --    MARY JO 7.5*  --   --   --   --    *  --   --  192* 202*

## 2019-07-31 NOTE — PLAN OF CARE
Discharge Planner PT   Patient plan for discharge: Home  Current status: Pt demonstrated rolling to left side with bed rail and SBA x 1.  Sidelying to/from sitting with min A x 1.  Sit to/from standing from EOB with FWW and CGA x 1.  Pt ambulated 60' with FWW and CGA x 1.  Pt was able to negotiate bathroom with CGA x 1.   Barriers to return to prior living situation: Level of assist, stairs, and pain.   Recommendations for discharge: Home with assist  Rationale for recommendations: Pt would benefit from further skilled PT to increase independence with all functional mobility and strengthening.        Entered by: Tabby Barakat 07/31/2019 2:00 PM

## 2019-07-31 NOTE — PLAN OF CARE
"  VS:    /67 (BP Location: Right arm)   Pulse 83   Temp 97.5  F (36.4  C) (Oral)   Resp 16   Ht 1.727 m (5' 8\")   Wt 90.2 kg (198 lb 13.7 oz)   SpO2 98%   BMI 30.24 kg/m   Stable no abnormalities noted. No reports of SOB, dizziness, or CP. LS clear equal bilaterally, on room air    Output:    Voids spontaneously w/out difficulty. Last BM 7/28/19, passing gas    Activity:    Assist of 1 w/ walker   Skin: Intact w/ the exception of incision/drains/devices   Pain:    Moderate, manageable w/ dilaudid 2-4mg q3h. Has available flexeril 10mg 3x daily and IV dilaudid for breakthrough pain and before/after PT/OT    Neuro/CMS:    Anterior thigh numbness noted, MD and ortho aware - continue to monitor    Dressing(s):    Posterior back: CDI, no drainage noted  L anterior abdomen: Open to air, CDI   Diet:    Regular, tolerating well. No N&V reported   Equipment:    Walker     IV's/Drains:    PIV L hand: saline locked  PIV R hand: saline locked  Hemovac: bag replaced, total from bag 300ccs   Plan:    Continue to monitor    Additional Info:                "

## 2019-07-31 NOTE — PLAN OF CARE
VS:   Soft BP. Afebrile. Sats high 90's on RA.    Output:   Voided urine x3 at the bathroom. -->130ml  HemoVac output 600ml   Activity:   Up with A1 and walker.  Uses bathroom well. Denies postural dizziness/LH.    Skin: ecchymosis around left abdominal incision.   Pain:   Pain controlled with PCA dilaudid ( 0.3mg q 10min setting) and scheduled tylenol prn flexeril.  Ice pack applied with reposition   Neuro/CMS:   New numbness in bilateral anterior thighs--- Ortho resident oncall  aware.  No other change in neuros/ CMS   Dressing(s):   Dressing to back CDI   Diet:   Tolerates clear liquid. Non/v or reflux.  Scopolamine patch placed on --left behind ear.     LDA:   HemoVac . Only oxygen 1L via NC  while  sleeping.    Equipment:   Continuous Pulse oximeter with PCA   Plan:   Continue to encourage IS use with coughing. Transition to PO pain med tomorrow.   Additional Info:   Encouraged IS use and oral fluid intake.  No flatus yet.  BS active. Offered prn dulcolax supp but pt declined.  No abdominal distention.

## 2019-07-31 NOTE — PLAN OF CARE
A/O x 4. Up with walker and assist of 1. Dressing to posterior spine is intact, lower abd incision JAMEL and intact. Pt is voiding well. PCA dilaudid is controlled at a tolerable level. Reports numbness in bilateral thighs. CMS and neuro's otherwise good. Ortho is aware. Reports passing flatus, no nausea or emesis. Call light is in reach. Cont to assess.

## 2019-07-31 NOTE — PROGRESS NOTES
Patient: Yun Sagastume  Date of Service: July 31, 2019 Admission Date:7/29/2019   2 Days Post-Op     Chief Pain Endorsement: postsurgical back pain    Recommendations were discussed and relayed to Sherice Parker NP (orthopedics)  Plan was reviewed by the Inpatient Pain Service and staff attending, Dr. Mendez       1. Agree with discontinue PCA  2. Continue hydromorphone 2-4 mg PO q3hr PRN   3. Initiate hydromorphone 0.3-0.5 mg IV every 3 hr PRN breakthrough pain not controlled by orals or prior to PT/OT sessions.   4. Discontinue gabapentin, patient reports she does not want on discharge and she has had ADR in the past (confusion, memory issues)  5. Remove stop date for acetaminophen and continue at 975 mg PO every 8 hr scheduled  6. Continue home cyclobenzaprine and hydroxyzine  7. Initiate Lidocaine 4% patches, 1-3 patch(es) transdermal every 24 hours (12 hours on and 12 hours off)  8. Start Aqua-K pad for cool therapy   9. Bowel regimen per primary team to prevent opioid induced constipation.  10. Defer evaluation of LE symptoms to primary team    Pain Service will Continue to follow at this time.     Thank you for consulting the Inpatient Pain Management Service. The above recommendations are to be acted upon at the primary team s discretion.     To reach us:  Mon - Friday 8 AM - 3 PM: Pager [phone] (Text Page)  After hours, weekends and holidays: Primary service should call [phone] for the on-call pain specialist    PAIN MEDICATION SAFE USE:   Prior to discharge instruct patient on the following in addition to the medication fact sheet:    Caution: these medications can cause sedation    Take prescription medicine only if it has been prescribed by your doctor    Do not take more medicine or take it more often than instructed     Call your doctor if pain gets worse    Never mix pain medicine with alcohol, sleeping pills, or any illicit drugs    Do not operate heavy machinery, including vehicles,  when initiation opioid therapy or increasing dosage    Store prescription opioids in a locked container, whenever possible     Dispose of unused opioids appropriately     Do not stop abruptly once at higher doses.  These medications must be tapered off.    Opioid pain medications do carry the risk for physical dependence and addiction and patients should be counseled about this.       Acute post op low back pain s/p L4-5 and L5-S1 ALIF as well as PLIF during a two stage procedure with Dr Carrion and Dr. Becky Grant on 7/29/19 due to acquired kyphosis, lumbar spondylosis, lumbar radicular pain.       H/O laminectomy 2011 at L4-L5 with complications of post laminectomy syndrome     Chronic pain syndrome - Follows with [facility]     Patient stopped taking MS contin ER 15mg BID in May in preparation of this surgery     Neuropathy in b/l toes and hands     HTN     DM Type II - diet controlled     S/P Buster-en-Y bariatric surgery - no NSAIDS     Obesity - BMI 30.24 kg/m2     Depression     Osteopenia     Former smoker - quit 15yrs ago       -- Outpatient opioid requirements prior to admission: Oxycodone 10mg po QID   -- MN  was reviewed.  Last script for oxycodone 10mg qty 120 tabs for 30 days filled on 7/3/19. Last script for MS continue ER 15mg qty 60 tabs for 30 days filled on 5/9/19     Primary Care Provider: Mary Ellen Salgado  Chronic Pain Provider: [facility]    Interval History:  Yun Sagastume was seen today (July 31, 2019) and she reports that her pain is tolerable with discomfort and is worse when standing up and mobilizing. Pain is worst in her back and into bilateral hips and does radiate down the legs.  She does endorse some bilateral anterior thigh numbness (stops at the knee) and plantarflexion on R foot is less than L foot however unclear if this was effort related or otherwise (this was discussed w/ RN and orthopedics NP).   She denies significant abdominal pain.  She has passed  gas x1 yesterday evening and last BM was on Sunday before surgery.  She reports daily BMs prior to admission. Patient had been up and ambulating with walker with OT this AM with some increased pain, but denied any weakness in her legs with walking and reports she did well overall.  She has just recently transitioned off the PCA and onto an oral dilaudid and reports that is helping with the pain.  She does not like the gabapentin and she reports that this has caused her confusion in the past and although she is fine using it here she does not want it on discharge so we agreed to just stop that today.  She was noted to be dozing off at times during the interview and when asked did initially think it was July 24th.  She knew where she was and why she was here.  She was conversant and appropriate in answering questions throughout the interview.  She was open to lidocaine patch as well as Aqua K pad for her back pain.   CAPA (Clinically Aligned Pain Assessment):    Comfort (How is your pain?): Tolerable with discomfort  Change in Pain (Since your last medication/intervention?): Getting better  Pain Control (How are your pain treatments working?):  Partially effective control  Functioning (Are you able to do activities to get better?) : Can do most things, but pain gets in the way of some   Sleep (Does your pain management allow you to sleep or rest?): Awake with occasional pain      FUNCTIONAL STATUS:  Change:      improving  Oral intake:     Regular  Activity level:     Up with assistance ambulating in room  Mood:      tired, poor energy     -- Inpatient Medications Related to Pain Management:   Medications related to Pain Management (From now, onward)    Start     Dose/Rate Route Frequency Ordered Stop    08/01/19 0000  acetaminophen (TYLENOL) tablet 650 mg      650 mg Oral EVERY 4 HOURS PRN 07/29/19 1746      07/31/19 1800  acetaminophen (TYLENOL) tablet 975 mg      975 mg Oral EVERY 8 HOURS 07/31/19 1013       07/31/19 1015  Lidocaine (LIDOCARE) 4 % Patch 1-3 patch      1-3 patch  over 12 Hours Transdermal EVERY 24 HOURS 0800 07/31/19 1013      07/31/19 1015  lidocaine patch in PLACE       Transdermal EVERY 8 HOURS 07/31/19 1013      07/31/19 1013  HYDROmorphone (PF) (DILAUDID) injection 0.3-0.5 mg      0.3-0.5 mg Intravenous EVERY 3 HOURS PRN 07/31/19 1013      07/30/19 2000  polyethylene glycol (MIRALAX/GLYCOLAX) Packet 17 g      17 g Oral 2 TIMES DAILY 07/30/19 1407      07/29/19 2000  senna-docusate (SENOKOT-S/PERICOLACE) 8.6-50 MG per tablet 1 tablet      1 tablet Oral 2 TIMES DAILY 07/29/19 1845 07/29/19 2000  senna-docusate (SENOKOT-S/PERICOLACE) 8.6-50 MG per tablet 2 tablet      2 tablet Oral 2 TIMES DAILY 07/29/19 1845 07/29/19 2000  docusate sodium (COLACE) capsule 200 mg      200 mg Oral 2 TIMES DAILY 07/29/19 1845      07/29/19 1845  lidocaine 1 % 0.1-1 mL      0.1-1 mL Other EVERY 1 HOUR PRN 07/29/19 1845      07/29/19 1845  lidocaine (LMX4) cream       Topical EVERY 1 HOUR PRN 07/29/19 1845      07/29/19 1845  bisacodyl (DULCOLAX) Suppository 10 mg      10 mg Rectal DAILY PRN 07/29/19 1845      07/29/19 1845  HYDROmorphone (DILAUDID) tablet 2-4 mg      2-4 mg Oral EVERY 3 HOURS PRN 07/29/19 1845      07/29/19 1746  cyclobenzaprine (FLEXERIL) tablet 10 mg      10 mg Oral 3 TIMES DAILY PRN 07/29/19 1746      07/29/19 1746  hydrOXYzine (ATARAX) tablet 25 mg      25 mg Oral EVERY 6 HOURS PRN 07/29/19 1746            LAB DATA:  Recent Labs   Lab 07/30/19  0638 07/29/19  2206 07/29/19  1518   CR 0.97  --   --    WBC 8.1  --   --    HGB 9.1* 9.8* 11.5*         ----------------------------------------------------------------------------------  Thang Prieto, [facility]  Inpatient Pain Service     To reach us:  Mon - Friday 8 AM - 3 PM: Pager [phone] (Text Page)  After hours, weekends and holidays: Primary service should call [phone] for the on-call pain specialist    Helpful Resources:  Getting Rid  of Unwanted Medications (printable PDF for patients)   Opioid Overdose Prevention Toolkit (printable PDF for patients)   Prescription Opioids: What You Need To Know (printable PDF for patients)

## 2019-07-31 NOTE — PROGRESS NOTES
SPIRITUAL HEALTH SERVICES  SPIRITUAL ASSESSMENT Progress Note  [facility] [place], [place] 10A     REFERRAL SOURCE: length of stay    I introduced Spiritual Health to pt Yun. She declined a  visit at this time.    PLAN: [facility] remains available for the duration of hospitalization.    Rock Durbin   Intern  Pager [phone]

## 2019-07-31 NOTE — PROGRESS NOTES
"Orthopaedic Surgery Progress Note:  07/31/2019     Subjective:   Postoperative day 2.  No acute issues overnight.  Pain has been well controlled with the PCA. Tolerating diet and voiding. Complained of numbness over the anterior thighs bilaterally, does not go past the level of the knee. Feels that she is mobilizing more comfortably. The patient denies any chest pain, shortness of breath, fevers, chills.      Objective:   /51 (BP Location: Right arm)   Pulse 83   Temp 99.6  F (37.6  C) (Oral)   Resp 14   Ht 1.727 m (5' 8\")   Wt 90.2 kg (198 lb 13.7 oz)   SpO2 (P) 97%   BMI 30.24 kg/m    I/O this shift:  In: 100 [P.O.:100]  Out: 1400 [Urine:1400]  Gen: NAD. Resting comfortably in bed  Resp: Breathing comfortably on RA  Drain:   Deep drain output of 275/pending at 24/7 hours, respectively.  Musculoskeletal: Dressings anteriorly and posteriorly are clean dry and intact, no evidence of erythema fluctuance or drainage.  The abdomen is soft without evidence of peritoneal signs.  Very small amount of sanguinous fluid in drain.      Lower extremities:  Motor Strength Right Left   Hip flexion: L1, L2, L3 5/5 5/5   Hip adduction: L2, L3 5/5 5/5   Knee flexion: S1 5/5 5/5   Knee extension: L3, L4 4/5 4/5   Ankle dosiflexion: L4, L5 5/5 5/5   EHL: L5 5/5 5/5   Ankle plantarflexion: S1 5/5 5/5     Sensation from L1-S2 is preserved.    Labs:  Lab Results   Component Value Date    WBC 8.1 07/30/2019    HGB 9.1 (L) 07/30/2019     07/30/2019        Assessment & Plan:   Yun Sagastume is a 54 year old female with lumbar spine spondylosis and saggital malalignment, s/p L4-5 and L5-S1 ALIF as well as PLIF during a two stage procedure with Dr Carrion on 7/30/2019.    PCA to be discontinued today.  Orthopaedics Primary  Activity: Up with assist until independent. No excessive bending or twisting. No lifting >10 lbs x 6 weeks. No Shelia lift for transfers.   Weight bearing status: WBAT.  Pain management: Transition " from IV to PO as tolerated. No NSAIDs   Antibiotics: Ancef for 24 hrs, done.  Diet: NPO until patient has bowel sounds, the begin with clear fluids and progress diet as tolerated.   DVT prophylaxis: SCDs only. No chemical DVT ppx needed.  Imaging: XR Upright  PTDC - ordered.  Labs: labs PRN  Bracing/Splinting: None.  Dressings: Keep Aquacel c/d/i x 7 days.  Drains: Document output per shift, will be discontinued at Orthopedic Surgery discretion.  Schuster catheter: Remove POD#1.   Physical Therapy/Occupational Therapy: Eval and treat.  Consults: Hospitalist  Follow-up: Clinic with Dr. Carrion in 6 weeks with repeat x-rays.   Disposition: Pending progress with therapies, pain control on orals, and medical stability.    Orthopaedics surgery staff for this patient is Dr. Carrion.    ------------------------------------------------------------------------------------------    [x] Discontinue PCA  [   ] Drains removed.  [   ] Post xrays done.  [x] Discharge orders done.  [x] Discharge summary started.    Jose Jones MD  Orthopaedic Surgery, PGY-4  Pager: [phone]        FOLLOWUP:    Future Appointments   Date Time Provider Department Center   7/30/2019 11:00 AM Tabby Barakat Pt, PT [facility] [place]   7/30/2019  3:30 PM Tabby Barakat Pt, PT [facility] [place]   7/31/2019  7:30 AM Kimberly Gustafson OT [facility] [place]   9/5/2019 10:30 AM Jeremiah Carrion MD [facility]   10/29/2019 10:30 AM Jeremiah Carrion MD [facility]

## 2019-08-01 ENCOUNTER — APPOINTMENT (OUTPATIENT)
Dept: PHYSICAL THERAPY | Facility: CLINIC | Age: 54
End: 2019-08-01
Attending: ORTHOPAEDIC SURGERY
Payer: COMMERCIAL

## 2019-08-01 ENCOUNTER — APPOINTMENT (OUTPATIENT)
Dept: GENERAL RADIOLOGY | Facility: CLINIC | Age: 54
End: 2019-08-01
Attending: PHYSICIAN ASSISTANT
Payer: COMMERCIAL

## 2019-08-01 ENCOUNTER — APPOINTMENT (OUTPATIENT)
Dept: OCCUPATIONAL THERAPY | Facility: CLINIC | Age: 54
End: 2019-08-01
Attending: ORTHOPAEDIC SURGERY
Payer: COMMERCIAL

## 2019-08-01 LAB
GLUCOSE BLDC GLUCOMTR-MCNC: 139 MG/DL (ref 70–99)
GLUCOSE BLDC GLUCOMTR-MCNC: 147 MG/DL (ref 70–99)
GLUCOSE BLDC GLUCOMTR-MCNC: 151 MG/DL (ref 70–99)
GLUCOSE BLDC GLUCOMTR-MCNC: 205 MG/DL (ref 70–99)

## 2019-08-01 PROCEDURE — 97535 SELF CARE MNGMENT TRAINING: CPT | Mod: GO

## 2019-08-01 PROCEDURE — 25000132 ZZH RX MED GY IP 250 OP 250 PS 637: Performed by: NURSE PRACTITIONER

## 2019-08-01 PROCEDURE — 99231 SBSQ HOSP IP/OBS SF/LOW 25: CPT | Performed by: INTERNAL MEDICINE

## 2019-08-01 PROCEDURE — 25000132 ZZH RX MED GY IP 250 OP 250 PS 637: Performed by: STUDENT IN AN ORGANIZED HEALTH CARE EDUCATION/TRAINING PROGRAM

## 2019-08-01 PROCEDURE — 97530 THERAPEUTIC ACTIVITIES: CPT | Mod: GP | Performed by: PHYSICAL THERAPIST

## 2019-08-01 PROCEDURE — 12000001 ZZH R&B MED SURG/OB UMMC

## 2019-08-01 PROCEDURE — 25000132 ZZH RX MED GY IP 250 OP 250 PS 637: Performed by: PHYSICIAN ASSISTANT

## 2019-08-01 PROCEDURE — 97116 GAIT TRAINING THERAPY: CPT | Mod: GP | Performed by: PHYSICAL THERAPIST

## 2019-08-01 PROCEDURE — 25000128 H RX IP 250 OP 636: Performed by: NURSE PRACTITIONER

## 2019-08-01 PROCEDURE — 72100 X-RAY EXAM L-S SPINE 2/3 VWS: CPT

## 2019-08-01 PROCEDURE — 99207 ZZC NO CHARGE FOLLOW UP PS: CPT

## 2019-08-01 PROCEDURE — 00000146 ZZHCL STATISTIC GLUCOSE BY METER IP

## 2019-08-01 RX ORDER — AMOXICILLIN 250 MG
1 CAPSULE ORAL 2 TIMES DAILY
Qty: 40 TABLET | Refills: 0 | Status: SHIPPED | OUTPATIENT
Start: 2019-08-01 | End: 2019-10-08

## 2019-08-01 RX ORDER — HYDROMORPHONE HYDROCHLORIDE 4 MG/1
4-6 TABLET ORAL EVERY 6 HOURS PRN
Qty: 50 TABLET | Refills: 0 | Status: SHIPPED | OUTPATIENT
Start: 2019-08-01 | End: 2019-08-06

## 2019-08-01 RX ORDER — HYDROMORPHONE HYDROCHLORIDE 2 MG/1
4-6 TABLET ORAL
Status: DISCONTINUED | OUTPATIENT
Start: 2019-08-01 | End: 2019-08-02

## 2019-08-01 RX ORDER — POLYETHYLENE GLYCOL 3350 17 G/17G
17 POWDER, FOR SOLUTION ORAL 2 TIMES DAILY
Qty: 255 G | Refills: 0 | Status: ON HOLD | OUTPATIENT
Start: 2019-08-01 | End: 2019-12-02

## 2019-08-01 RX ADMIN — CYCLOBENZAPRINE HYDROCHLORIDE 10 MG: 5 TABLET, FILM COATED ORAL at 09:06

## 2019-08-01 RX ADMIN — PAROXETINE HYDROCHLORIDE HEMIHYDRATE 60 MG: 20 TABLET, FILM COATED ORAL at 08:51

## 2019-08-01 RX ADMIN — HYDROMORPHONE HYDROCHLORIDE 0.5 MG: 1 INJECTION, SOLUTION INTRAMUSCULAR; INTRAVENOUS; SUBCUTANEOUS at 00:02

## 2019-08-01 RX ADMIN — LIDOCAINE 2 PATCH: 560 PATCH PERCUTANEOUS; TOPICAL; TRANSDERMAL at 08:55

## 2019-08-01 RX ADMIN — HYDROMORPHONE HYDROCHLORIDE 6 MG: 2 TABLET ORAL at 18:02

## 2019-08-01 RX ADMIN — CYCLOBENZAPRINE HYDROCHLORIDE 10 MG: 5 TABLET, FILM COATED ORAL at 00:52

## 2019-08-01 RX ADMIN — HYDROMORPHONE HYDROCHLORIDE 6 MG: 2 TABLET ORAL at 10:01

## 2019-08-01 RX ADMIN — HYDROMORPHONE HYDROCHLORIDE 0.3 MG: 1 INJECTION, SOLUTION INTRAMUSCULAR; INTRAVENOUS; SUBCUTANEOUS at 22:20

## 2019-08-01 RX ADMIN — ACETAMINOPHEN 975 MG: 325 TABLET, FILM COATED ORAL at 04:01

## 2019-08-01 RX ADMIN — HYDROMORPHONE HYDROCHLORIDE 6 MG: 2 TABLET ORAL at 07:01

## 2019-08-01 RX ADMIN — BISACODYL 10 MG: 10 SUPPOSITORY RECTAL at 19:25

## 2019-08-01 RX ADMIN — SENNOSIDES AND DOCUSATE SODIUM 2 TABLET: 8.6; 5 TABLET ORAL at 21:07

## 2019-08-01 RX ADMIN — SENNOSIDES AND DOCUSATE SODIUM 2 TABLET: 8.6; 5 TABLET ORAL at 08:51

## 2019-08-01 RX ADMIN — ACETAMINOPHEN 975 MG: 325 TABLET, FILM COATED ORAL at 18:02

## 2019-08-01 RX ADMIN — POLYETHYLENE GLYCOL 3350 17 G: 17 POWDER, FOR SOLUTION ORAL at 21:07

## 2019-08-01 RX ADMIN — HYDROMORPHONE HYDROCHLORIDE 6 MG: 2 TABLET ORAL at 21:07

## 2019-08-01 RX ADMIN — DOCUSATE SODIUM 200 MG: 100 CAPSULE, LIQUID FILLED ORAL at 08:52

## 2019-08-01 RX ADMIN — HYDROMORPHONE HYDROCHLORIDE 6 MG: 2 TABLET ORAL at 15:06

## 2019-08-01 RX ADMIN — HYDROMORPHONE HYDROCHLORIDE 6 MG: 2 TABLET ORAL at 00:51

## 2019-08-01 RX ADMIN — HYDROMORPHONE HYDROCHLORIDE 0.3 MG: 1 INJECTION, SOLUTION INTRAMUSCULAR; INTRAVENOUS; SUBCUTANEOUS at 02:46

## 2019-08-01 RX ADMIN — ACETAMINOPHEN 975 MG: 325 TABLET, FILM COATED ORAL at 09:06

## 2019-08-01 RX ADMIN — DOCUSATE SODIUM 200 MG: 100 CAPSULE, LIQUID FILLED ORAL at 21:07

## 2019-08-01 RX ADMIN — RANITIDINE 150 MG: 150 TABLET ORAL at 07:01

## 2019-08-01 RX ADMIN — HYDROMORPHONE HYDROCHLORIDE 6 MG: 2 TABLET ORAL at 04:01

## 2019-08-01 RX ADMIN — RANITIDINE 150 MG: 150 TABLET ORAL at 18:03

## 2019-08-01 RX ADMIN — CYCLOBENZAPRINE HYDROCHLORIDE 10 MG: 5 TABLET, FILM COATED ORAL at 16:48

## 2019-08-01 RX ADMIN — POLYETHYLENE GLYCOL 3350 17 G: 17 POWDER, FOR SOLUTION ORAL at 08:53

## 2019-08-01 ASSESSMENT — ACTIVITIES OF DAILY LIVING (ADL)
ADLS_ACUITY_SCORE: 14

## 2019-08-01 ASSESSMENT — PAIN DESCRIPTION - DESCRIPTORS
DESCRIPTORS: ACHING
DESCRIPTORS: ACHING

## 2019-08-01 NOTE — PLAN OF CARE
VS:   Stable with exception of slight fever at 99.5  this evening. Lung sounds CTA.   Output:   Voiding without difficulty   Activity:   Up to bathroom with walker and assist of one. Otherwise remained in bed secondary to pain.   Skin: WDL with exception of incision and drain site.   Pain:   Pain has been difficult to manage. Patient receiving 4 mg oral Dilaudid every three hours along with 0.3-0.5 mg IV Dilaudid every three hours.   Neuro/CMS:   Patient reports numbness on bilateral anterior thighs which is made worse by movement.   Dressing(s):   Aquacel on back is CDI. L abdominal incision is open to air with slight redness around incision.   Diet:   Regular; tolerated well.   LDA:   PIV is SL  Hemovac had 30 mL output this shift.   Equipment:   Walker at bedside   Plan:   Continue to assess pain in a timely manner.   Additional Info:

## 2019-08-01 NOTE — PROGRESS NOTES
"Orthopaedic Surgery Progress Note:  08/01/2019     Subjective:   Postoperative day 3.  No acute issues overnight.  Pain continues to be an issue for the patient, now well managed with PRN's. Discussed that we need to continue working with PT for mobilization. Able to ambulate around the room and tolerating her baseline diet. The patient denies any chest pain, shortness of breath, fevers, chills, new numbness or tingling down the extremities.        Objective:   /69 (BP Location: Right arm)   Pulse 89   Temp 98.9  F (37.2  C) (Oral)   Resp 16   Ht 1.727 m (5' 8\")   Wt 90.2 kg (198 lb 13.7 oz)   SpO2 96%   BMI 30.24 kg/m    No intake/output data recorded.  Gen: NAD. Resting comfortably in bed  Resp: Breathing comfortably on RA  Drain:   Deep drain output of 90/20 at 24/7 hours, respectively.  Musculoskeletal: Dressings anteriorly and posteriorly are clean dry and intact, no evidence of erythema fluctuance or drainage.  The abdomen is soft without evidence of peritoneal signs.  Very small amount of sanguinous fluid in drain.      Lower extremities:  Motor Strength Right Left   Hip flexion: L1, L2, L3 5/5 5/5   Hip adduction: L2, L3 5/5 5/5   Knee flexion: S1 5/5 5/5   Knee extension: L3, L4 4/5 4/5   Ankle dosiflexion: L4, L5 5/5 5/5   EHL: L5 5/5 5/5   Ankle plantarflexion: S1 5/5 5/5     Sensation from L1-S2 is preserved.    Labs:  Lab Results   Component Value Date    WBC 8.1 07/30/2019    HGB 9.1 (L) 07/30/2019     07/30/2019        Assessment & Plan:   Yun Sagastume is a 54 year old female with lumbar spine spondylosis and saggital malalignment, s/p L4-5 and L5-S1 ALIF as well as PLIF during a two stage procedure with Dr Carrion on 7/30/2019.    Drains to be removed likely this afternoon, after discussion with Dr Carrion.    Orthopaedics Primary  Activity: Up with assist until independent. No excessive bending or twisting. No lifting >10 lbs x 6 weeks. No Shelia lift for transfers.   Weight " bearing status: WBAT.  Pain management: Transition from IV to PO as tolerated. No NSAIDs   Antibiotics: Ancef for 24 hrs, done.  Diet: NPO until patient has bowel sounds, the begin with clear fluids and progress diet as tolerated.   DVT prophylaxis: SCDs only. No chemical DVT ppx needed.  Imaging: XR Upright  PTDC - ordered.  Labs: labs PRN  Bracing/Splinting: None.  Dressings: Keep Aquacel c/d/i x 7 days.  Drains: Document output per shift, will be discontinued at Orthopedic Surgery discretion.  Schuster catheter: Remove POD#1.   Physical Therapy/Occupational Therapy: Eval and treat.  Consults: Hospitalist  Follow-up: Clinic with Dr. Carrion in 6 weeks with repeat x-rays.   Disposition: Pending progress with therapies, pain control on orals, and medical stability.    Orthopaedics surgery staff for this patient is Dr. Carrion.    ------------------------------------------------------------------------------------------    [x] Discontinue PCA  [   ] Drains removed.  [   ] Post xrays done.  [x] Discharge orders done.  [x] Discharge summary started.    Jose Jones MD  Orthopaedic Surgery, PGY-4  Pager: [phone]        FOLLOWUP:    Future Appointments   Date Time Provider Department Center   7/30/2019 11:00 AM Tabby Barakat Pt, PT [facility] [place]   7/30/2019  3:30 PM Tabby Barakat Pt, PT [facility] [place]   7/31/2019  7:30 AM Kimberly Gustafson OT [facility] [place]   9/5/2019 10:30 AM Jeremiah Carrion MD [facility]   10/29/2019 10:30 AM Jeremiah Carrion MD [facility]

## 2019-08-01 NOTE — PROGRESS NOTES
Patient: Yun Sagastume  Date of Service: August 1, 2019 Admission Date:7/29/2019   3 Days Post-Op     Chief Pain Endorsement: back pain    Recommendations were discussed and relayed to Sherice Parker CNP  Plan was reviewed by the Inpatient Pain Service and staff attending, Dr. Mendez      1. Continue HYDROmorphone 4-6mg by mouth every 3 hour as needed moderate-severe pain    Consider decreasing to 2-4mg by mouth every 3 hour as needed tomorrow morning if patient doesn't notice any improvement in pain.   2. Decrease IV HYDROmorphone to 0.3-0.5mg twice daily as needed breakthrough pain not managed on orals x 24 hours then discontinue   3. Continue cyclobenzaprine 10mg by mouth three times daily as needed muscle spasms   4. Continue acetaminophen 975mg by mouth every 8 hour   5. Continue Lidocaine 4% patches, 1-2 patch(es) transdermal every 24 hours (12 hours on and 12 hours off)  6. Bowel regimen per primary team to prevent opioid induced constipation.  7. Defer lower extremity numbness workup to primary team.     Pain Service will Continue to follow peripherally, but will not put notes in every day.     Thank you for consulting the Inpatient Pain Management Service. The above recommendations are to be acted upon at the primary team s discretion.     To reach us:  Mon - Friday 8 AM - 3 PM: Pager [phone] (Text Page)  After hours, weekends and holidays: Primary service should call [phone] for the on-call pain specialist    PAIN MEDICATION SAFE USE:   Prior to discharge instruct patient on the following in addition to the medication fact sheet:    Caution: these medications can cause sedation    Take prescription medicine only if it has been prescribed by your doctor    Do not take more medicine or take it more often than instructed     Call your doctor if pain gets worse    Never mix pain medicine with alcohol, sleeping pills, or any illicit drugs    Do not operate heavy machinery, including vehicles,  "when initiation opioid therapy or increasing dosage    Store prescription opioids in a locked container, whenever possible     Dispose of unused opioids appropriately     Do not stop abruptly once at higher doses.  These medications must be tapered off.    Opioid pain medications do carry the risk for physical dependence and addiction and patients should be counseled about this.         1. History of laminectomy 2011 at L4-5 with complications of post laminectomy syndrome  2. Neuropathy in BL toes and hands  3. DM2   4. S/p Buster-en-Y  5. BMI 30  6. History of MDD  7. Chronic pain syndrome - follows [facility] - patient stopped ms contin 15mg twice daily in May in preparation for this surgery  8. Opioid tolerant     -- Outpatient opioid requirements prior to admission:   -- oxyCODONE 10mg; Qty 120; DS 30; filled 7/3/19  -- ms contin 15mg; Qty 60; DS 30; filled 5/9/19    Primary Care Provider: Mary Ellen Salgado  Chronic Pain Provider: [facility]    Interval History:  Yun Sagastume was seen today (August 1, 2019) and she reports that she is having numbness issues in both legs still. She had her HYDROmorphone increased last night and has received 3 doses of 6mg, without any noticeable improvement in pain. She did fall asleep during my conversation with her. When asked if she believes her pain medication makes her sleepy, she reports \"yes\". She endorses some muscle spasms in her R calf which is baseline for her. She manages her spasms with cyclobenzaprine at home and this is unchanged post surgery. She had 2 episode of radiating pain in her R leg only yesterday but is intolerant to gabapentin. She reports that she was able to walk 10ft yesterday and sit in her chair. She felt that she sat in the chair for an extended period. When talking to the patient about her numbness, she reported that Dr. Carrion was by yesterday and his thoughts about the numbness was the duration of laying on the table. " I would ultimately defer to the primary team working up the BL leg numbness, as this is an issue for the patient.     Her last bowel movement was Sunday and it was Normal.   CAPA (Clinically Aligned Pain Assessment):    Comfort (How is your pain?): Tolerable with discomfort  Change in Pain (Since your last medication/intervention?): About the same  Pain Control (How are your pain treatments working?):  Partially effective control  Functioning (Are you able to do activities to get better?) : Pain keeps me from doing most of what I need to do  Sleep (Does your pain management allow you to sleep or rest?): Awake with occasional pain      FUNCTIONAL STATUS:  Change:      staying the same  Oral intake:     Regular  Activity level:     Ambulating in anne  Mood:      adjusting to situation     -- Inpatient Medications Related to Pain Management:   Medications related to Pain Management (From now, onward)    Start     Dose/Rate Route Frequency Ordered Stop    08/01/19 0016  HYDROmorphone (DILAUDID) tablet 4-6 mg      4-6 mg Oral EVERY 3 HOURS PRN 08/01/19 0019      08/01/19 0000  acetaminophen (TYLENOL) tablet 650 mg      650 mg Oral EVERY 4 HOURS PRN 07/29/19 1746      07/31/19 1800  acetaminophen (TYLENOL) tablet 975 mg      975 mg Oral EVERY 8 HOURS 07/31/19 1013      07/31/19 1015  Lidocaine (LIDOCARE) 4 % Patch 1-3 patch      1-3 patch  over 12 Hours Transdermal EVERY 24 HOURS 0800 07/31/19 1013      07/31/19 1015  lidocaine patch in PLACE       Transdermal EVERY 8 HOURS 07/31/19 1013      07/31/19 1013  HYDROmorphone (PF) (DILAUDID) injection 0.3-0.5 mg      0.3-0.5 mg Intravenous EVERY 3 HOURS PRN 07/31/19 1013      07/30/19 2000  polyethylene glycol (MIRALAX/GLYCOLAX) Packet 17 g      17 g Oral 2 TIMES DAILY 07/30/19 1407      07/29/19 2000  senna-docusate (SENOKOT-S/PERICOLACE) 8.6-50 MG per tablet 1 tablet      1 tablet Oral 2 TIMES DAILY 07/29/19 1845      07/29/19 2000  senna-docusate (SENOKOT-S/PERICOLACE)  8.6-50 MG per tablet 2 tablet      2 tablet Oral 2 TIMES DAILY 07/29/19 1845 07/29/19 2000  docusate sodium (COLACE) capsule 200 mg      200 mg Oral 2 TIMES DAILY 07/29/19 1845 07/29/19 1845  lidocaine 1 % 0.1-1 mL      0.1-1 mL Other EVERY 1 HOUR PRN 07/29/19 1845 07/29/19 1845  lidocaine (LMX4) cream       Topical EVERY 1 HOUR PRN 07/29/19 1845 07/29/19 1845  bisacodyl (DULCOLAX) Suppository 10 mg      10 mg Rectal DAILY PRN 07/29/19 1845 07/29/19 1746  cyclobenzaprine (FLEXERIL) tablet 10 mg      10 mg Oral 3 TIMES DAILY PRN 07/29/19 1746 07/29/19 1746  hydrOXYzine (ATARAX) tablet 25 mg      25 mg Oral EVERY 6 HOURS PRN 07/29/19 1746            LAB DATA:  Recent Labs   Lab 07/30/19  0638 07/29/19 2206 07/29/19  1518   CR 0.97  --   --    WBC 8.1  --   --    HGB 9.1* 9.8* 11.5*         ----------------------------------------------------------------------------------  [facility]  Inpatient Pain Service     To reach us:  Mon - Friday 8 AM - 3 PM: Pager [phone] (Text Page)  After hours, weekends and holidays: Primary service should call [phone] for the on-call pain specialist    Helpful Resources:  Getting Rid of Unwanted Medications (printable PDF for patients)   Opioid Overdose Prevention Toolkit (printable PDF for patients)   Prescription Opioids: What You Need To Know (printable PDF for patients)

## 2019-08-01 NOTE — PLAN OF CARE
Discharge Planner OT   Patient plan for discharge: TCU vs home  Current status: Patient CGA for supine>sit using log roll with increased time, pain. Patient completed LE dressing using AE with light min A. Ambulated to/from bathroom using FWW with SBA, moving slowly. Completed oral cares standing at sink.   Barriers to return to prior living situation: pain, limited assist during day, below baseline with mobility and ADLs  Recommendations for discharge: TCU  Rationale for recommendations: To maximize safety and IND with functional mobility and ADLs/IADLs       Entered by: DEEPA CHOUDHURY 08/01/2019 1:00 PM

## 2019-08-01 NOTE — PLAN OF CARE
Discharge Planner PT   Patient plan for discharge: TCU  Current status: Sit to/from standing from chair with FWW and CGA x 1.  Pt ambulated 70' with FWW and CGA x 1.  Pt ambulates slowly and fatigues quickly with OOB activity.    Barriers to return to prior living situation: Level of assist, stairs, and activity tolerance.    Pt will be alone during the day, and will only have assist at night.   Recommendations for discharge: TCU  Rationale for recommendations: Pt would benefit from TCU stay for LE strengthening and increase independence with all functional mobility at baseline.         Entered by: Tabby Barakat 08/01/2019 1:29 PM

## 2019-08-01 NOTE — PROGRESS NOTES
[facility]    Medicine Progress Note - Hospitalist Service       Date of Admission:  7/29/2019  Assessment & Plan         1. SP L4-L5, L5S1 Fusion, POD#3  -Post operative mild hypotension now resolved. Stop IV fluids   - Perioperative blood loss at 1300 mls. Post op Hgb at 9.1.    -transfuse for Hb less than 7\  Has known bilateral numbness in her thighs. Primary team aware. Issues with mobilizing due to both bilatreal numbness and pain   PCA pump stopped t( 7/31). Transition to oral pain medications   - Remainder of the management as per primary team      2. HT:   Hold lisinopril-hydrochlorothiazide as patient continues to have relative asymptomatic hypotension     3. DM-2:   PTA diet controlled.  She got 8 mg decadran.   Given stable blood sugars, Stopped sliding scale insulin and limit blood sugar checks to twice daily  Stable blood sugars     4. Vit D def and Osteoporosis:   On Vit D supplements 85992 units every week    5. R&Y Gastric Bypass:   Avoid NSAIDs. She was on PPI for long time. Recently has been stopped. She is using OTC Zantac . We will schedule it here.     6. Hyponatremia: ( Resolved)   Likely due to hypovolemia, which resolved with use of IV fluids     7. Anxiety and Depression:   Stable   Continue  paxil        Diet: Advance Diet as Tolerated: Regular Diet Adult  Diet    DVT Prophylaxis: Pneumatic Compression Devices  Schuster Catheter: not present  Code Status: Full Code      Disposition Plan   Expected discharge: Per primary team   Entered: Riaz Spear MD 08/01/2019, 11:03 AM       The patient's care was discussed with the Bedside Nurse, Care Coordinator/, Patient and Primary team.    Riaz Spear MD  Hospitalist Service  [facility]    ______________________________________________________________________    Interval History   Yun thinks that her numbness and pain is  limiting her activity and wants greater pain control   Denies fevers or chills   Denies nausea, vomiting or diarrhea   passing gas  No chest pain/ SOB       Data reviewed today: I reviewed all medications, new labs and imaging results over the last 24 hours. I personally reviewed no images or EKG's today.    Physical Exam   Vital Signs: Temp: 96.4  F (35.8  C) Temp src: Oral BP: 117/64 Pulse: 100 Heart Rate: 98 Resp: 16 SpO2: 98 % O2 Device: None (Room air)    Weight: 198 lbs 13.68 oz  General Appearance: Awake, alert and not in distress  Respiratory: Normal breath sounds bilaterally   Cardiovascular: Normal heart sounds   GI: Soft, non tender. Normal bowel sounds   Skin: No bruising / bleeding   Other: Drain present. Moving both legs with symmetric strength and power     Data   Recent Labs   Lab 07/30/19  0638 07/29/19  2206 07/29/19  2048 07/29/19  1518 07/29/19  1230   WBC 8.1  --   --   --   --    HGB 9.1* 9.8*  --  11.5* 10.8*   MCV 89  --   --   --   --      --   --   --   --      --  134 129* 130*   POTASSIUM 4.2  --   --  4.8 4.3   CHLORIDE 102  --   --   --   --    CO2 28  --   --   --   --    BUN 15  --   --   --   --    CR 0.97  --   --   --   --    ANIONGAP 5  --   --   --   --    MARY JO 7.5*  --   --   --   --    *  --   --  192* 202*

## 2019-08-01 NOTE — PROGRESS NOTES
JARRELL informed by PT/OT that they are recommending TCU stay. SW reviewed pt's insurance-are [place] [employer] and called [facility] in [facility]. Pt does not have TCU/SNF benefits. SW confirmed with [facility].     Sherice Parker, NP, Kimberly Gustafson, OT and Tabby Barakat, PT , and Polly Sanford, RNCC informed. SW cont' to follow per request referral.

## 2019-08-01 NOTE — PLAN OF CARE
A/O x 4.  Pt is frustrated with pain control. Call was placed at 0015 to the Ortho resident on call to increase PO dilaudid to 4-6 mg every 3 hours. Flexaril given for spasms. IVP dilaudid given for breakthrough pain. Pain is tolerable except when gets out of bed. Pt uses walker and 1 assist to get up. Dressing to spine is c/d/I . Pt cont to report numbness in bilateral thighs from knees to hips and that this contributes to pain when gets up .  Call light is in reach. Cont to assess.

## 2019-08-01 NOTE — PLAN OF CARE
"      VS:   /65 (BP Location: Right arm)   Pulse 95   Temp 99.4  F (37.4  C) (Oral)   Resp 16   Ht 1.727 m (5' 8\")   Wt 90.2 kg (198 lb 13.7 oz)   SpO2 96%   BMI 30.24 kg/m       Output:   Spontaneously voiding in bathroom    Lungs LS clear equal bilaterally    Activity:   SBA with walker    Skin: Intact ex: incisions   Pain:   Pain partially tolerable with Oral 6mg dilaudid, flexeril 3 times a day, lidocaine patches applied, scheduled Tylenol   Neuro/CMS:   A&Ox4   Numbness from hips to knees bilaterally.    Dressing(s):   CDI    Diet:   Regular    LDA:   Hemovac, Loss of IV access during shift    Equipment:   Walker, gait belt   Plan:   Continue POC Xrays to be taken this afternoon, monitor pain management    Additional Info:          "

## 2019-08-02 ENCOUNTER — TELEPHONE (OUTPATIENT)
Facility: CLINIC | Age: 54
End: 2019-08-02

## 2019-08-02 ENCOUNTER — APPOINTMENT (OUTPATIENT)
Dept: OCCUPATIONAL THERAPY | Facility: CLINIC | Age: 54
End: 2019-08-02
Attending: ORTHOPAEDIC SURGERY
Payer: COMMERCIAL

## 2019-08-02 ENCOUNTER — APPOINTMENT (OUTPATIENT)
Dept: PHYSICAL THERAPY | Facility: CLINIC | Age: 54
End: 2019-08-02
Attending: ORTHOPAEDIC SURGERY
Payer: COMMERCIAL

## 2019-08-02 ENCOUNTER — APPOINTMENT (OUTPATIENT)
Dept: CT IMAGING | Facility: CLINIC | Age: 54
End: 2019-08-02
Attending: ORTHOPAEDIC SURGERY
Payer: COMMERCIAL

## 2019-08-02 LAB
GLUCOSE BLDC GLUCOMTR-MCNC: 129 MG/DL (ref 70–99)
GLUCOSE BLDC GLUCOMTR-MCNC: 153 MG/DL (ref 70–99)
GLUCOSE BLDC GLUCOMTR-MCNC: 203 MG/DL (ref 70–99)

## 2019-08-02 PROCEDURE — 72131 CT LUMBAR SPINE W/O DYE: CPT

## 2019-08-02 PROCEDURE — 25000132 ZZH RX MED GY IP 250 OP 250 PS 637: Performed by: PHYSICIAN ASSISTANT

## 2019-08-02 PROCEDURE — 25000132 ZZH RX MED GY IP 250 OP 250 PS 637: Performed by: STUDENT IN AN ORGANIZED HEALTH CARE EDUCATION/TRAINING PROGRAM

## 2019-08-02 PROCEDURE — 25000132 ZZH RX MED GY IP 250 OP 250 PS 637: Performed by: NURSE PRACTITIONER

## 2019-08-02 PROCEDURE — 99207 ZZC NO CHARGE FOLLOW UP PS: CPT

## 2019-08-02 PROCEDURE — 99232 SBSQ HOSP IP/OBS MODERATE 35: CPT | Performed by: INTERNAL MEDICINE

## 2019-08-02 PROCEDURE — 00000146 ZZHCL STATISTIC GLUCOSE BY METER IP

## 2019-08-02 PROCEDURE — 12000001 ZZH R&B MED SURG/OB UMMC

## 2019-08-02 PROCEDURE — 25000128 H RX IP 250 OP 636: Performed by: NURSE PRACTITIONER

## 2019-08-02 PROCEDURE — 97116 GAIT TRAINING THERAPY: CPT | Mod: GP | Performed by: PHYSICAL THERAPIST

## 2019-08-02 PROCEDURE — 97535 SELF CARE MNGMENT TRAINING: CPT | Mod: GO

## 2019-08-02 RX ORDER — PREGABALIN 25 MG/1
25 CAPSULE ORAL 3 TIMES DAILY
Status: DISCONTINUED | OUTPATIENT
Start: 2019-08-02 | End: 2019-08-06 | Stop reason: HOSPADM

## 2019-08-02 RX ORDER — PREGABALIN 25 MG/1
25 CAPSULE ORAL 3 TIMES DAILY
Qty: 180 CAPSULE | Refills: 0 | Status: SHIPPED | OUTPATIENT
Start: 2019-08-02 | End: 2019-08-06

## 2019-08-02 RX ORDER — OXYCODONE HYDROCHLORIDE 5 MG/1
15 TABLET ORAL
Status: DISCONTINUED | OUTPATIENT
Start: 2019-08-02 | End: 2019-08-06 | Stop reason: HOSPADM

## 2019-08-02 RX ADMIN — ACETAMINOPHEN 975 MG: 325 TABLET, FILM COATED ORAL at 09:22

## 2019-08-02 RX ADMIN — HYDROMORPHONE HYDROCHLORIDE 0.5 MG: 1 INJECTION, SOLUTION INTRAMUSCULAR; INTRAVENOUS; SUBCUTANEOUS at 08:03

## 2019-08-02 RX ADMIN — ACETAMINOPHEN 975 MG: 325 TABLET, FILM COATED ORAL at 17:50

## 2019-08-02 RX ADMIN — DOCUSATE SODIUM 200 MG: 100 CAPSULE, LIQUID FILLED ORAL at 19:43

## 2019-08-02 RX ADMIN — HYDROMORPHONE HYDROCHLORIDE 6 MG: 2 TABLET ORAL at 09:23

## 2019-08-02 RX ADMIN — OXYCODONE HYDROCHLORIDE 15 MG: 5 TABLET ORAL at 15:52

## 2019-08-02 RX ADMIN — OXYCODONE HYDROCHLORIDE 15 MG: 5 TABLET ORAL at 21:59

## 2019-08-02 RX ADMIN — SENNOSIDES AND DOCUSATE SODIUM 1 TABLET: 8.6; 5 TABLET ORAL at 19:43

## 2019-08-02 RX ADMIN — RANITIDINE 150 MG: 150 TABLET ORAL at 08:03

## 2019-08-02 RX ADMIN — HYDROMORPHONE HYDROCHLORIDE 6 MG: 2 TABLET ORAL at 06:02

## 2019-08-02 RX ADMIN — PREGABALIN 25 MG: 25 CAPSULE ORAL at 09:23

## 2019-08-02 RX ADMIN — HYDROMORPHONE HYDROCHLORIDE 6 MG: 2 TABLET ORAL at 02:57

## 2019-08-02 RX ADMIN — DOCUSATE SODIUM 200 MG: 100 CAPSULE, LIQUID FILLED ORAL at 08:04

## 2019-08-02 RX ADMIN — CYCLOBENZAPRINE HYDROCHLORIDE 10 MG: 5 TABLET, FILM COATED ORAL at 16:30

## 2019-08-02 RX ADMIN — CYCLOBENZAPRINE HYDROCHLORIDE 10 MG: 5 TABLET, FILM COATED ORAL at 00:13

## 2019-08-02 RX ADMIN — OXYCODONE HYDROCHLORIDE 15 MG: 5 TABLET ORAL at 18:50

## 2019-08-02 RX ADMIN — POLYETHYLENE GLYCOL 3350 17 G: 17 POWDER, FOR SOLUTION ORAL at 08:04

## 2019-08-02 RX ADMIN — PREGABALIN 25 MG: 25 CAPSULE ORAL at 19:43

## 2019-08-02 RX ADMIN — HYDROMORPHONE HYDROCHLORIDE 0.5 MG: 1 INJECTION, SOLUTION INTRAMUSCULAR; INTRAVENOUS; SUBCUTANEOUS at 19:42

## 2019-08-02 RX ADMIN — POLYETHYLENE GLYCOL 3350 17 G: 17 POWDER, FOR SOLUTION ORAL at 19:43

## 2019-08-02 RX ADMIN — SENNOSIDES AND DOCUSATE SODIUM 1 TABLET: 8.6; 5 TABLET ORAL at 08:04

## 2019-08-02 RX ADMIN — HYDROXYZINE HYDROCHLORIDE 25 MG: 25 TABLET ORAL at 19:43

## 2019-08-02 RX ADMIN — CYCLOBENZAPRINE HYDROCHLORIDE 10 MG: 5 TABLET, FILM COATED ORAL at 08:03

## 2019-08-02 RX ADMIN — OXYCODONE HYDROCHLORIDE 15 MG: 5 TABLET ORAL at 12:46

## 2019-08-02 RX ADMIN — PREGABALIN 25 MG: 25 CAPSULE ORAL at 14:24

## 2019-08-02 RX ADMIN — ACETAMINOPHEN 975 MG: 325 TABLET, FILM COATED ORAL at 02:57

## 2019-08-02 RX ADMIN — HYDROMORPHONE HYDROCHLORIDE 0.5 MG: 1 INJECTION, SOLUTION INTRAMUSCULAR; INTRAVENOUS; SUBCUTANEOUS at 16:36

## 2019-08-02 RX ADMIN — HYDROMORPHONE HYDROCHLORIDE 6 MG: 2 TABLET ORAL at 00:16

## 2019-08-02 RX ADMIN — RANITIDINE 150 MG: 150 TABLET ORAL at 18:50

## 2019-08-02 RX ADMIN — HYDROMORPHONE HYDROCHLORIDE 0.5 MG: 1 INJECTION, SOLUTION INTRAMUSCULAR; INTRAVENOUS; SUBCUTANEOUS at 11:31

## 2019-08-02 RX ADMIN — HYDROMORPHONE HYDROCHLORIDE 0.5 MG: 1 INJECTION, SOLUTION INTRAMUSCULAR; INTRAVENOUS; SUBCUTANEOUS at 01:30

## 2019-08-02 RX ADMIN — PAROXETINE HYDROCHLORIDE HEMIHYDRATE 60 MG: 20 TABLET, FILM COATED ORAL at 08:04

## 2019-08-02 ASSESSMENT — ACTIVITIES OF DAILY LIVING (ADL)
ADLS_ACUITY_SCORE: 14

## 2019-08-02 NOTE — TELEPHONE ENCOUNTER
Approval Fax:      Fay Marcum  [facility] Liaison     [facility]  [address]  [address] [place] [address] [address], [place] [address]   [email or web address]  [email or web address]   Phone: [phone]  Pager: [phone]  Fax: [phone]

## 2019-08-02 NOTE — TELEPHONE ENCOUNTER
Prior Authorization Approval    Authorization Effective Date: 7/3/2019  Authorization Expiration Date: 8/1/2020  Medication: Lyrica 50mg capsules (generic)  Approved Dose/Quantity: 3 capsules daily  Reference #: CMM Key: [id number] - [place] Case ID: [id number]   Insurance Company: [employer] - Phone [phone] Fax [phone]  Expected CoPay: $0.00     CoPay Card Available: No    Foundation Assistance Needed: n/a  Which Pharmacy is filling the prescription (Not needed for infusion/clinic administered): n/a - Patient has not yet been discharged, and there are no outpatient orders for this medication yet        Fay Marcum  [facility] Discharge Pharmacy Liaison     [facility]  [address] [place] [address]  [address] [place] [address] [address] [address]   [email or web address]  [email or web address]   Phone: [phone]  Pager: [phone]  Fax: [phone]

## 2019-08-02 NOTE — PLAN OF CARE
A/O x 4. Frustrated by pain and numbness in thighs. PO dilaudid given every 3 hours IVP dilaudid give x 1 for breakthrough pain. Dressing c/d/I, H-vac patent. Abd inc JAMEL and c/d/I. CMS and neuro's are unchanged good strength in lower extremities. Minimal assist with bed mobility ambulates with walker into the bathroom. Call light in reach. cont to assess.

## 2019-08-02 NOTE — PLAN OF CARE
Discharge Planner PT   Patient plan for discharge: Home with assist from family.    Current status: Pt demonstrate improved gait/activity tolerance.  Pt needed fewer rest breaks and was less dependent on walker.  Pt ambulated 250' with FWW and SBA x 1.  Pt was able to negotiate bathroom with distant supervision.    Barriers to return to prior living situation: Level of assist, activity tolerance, and stairs.    Recommendations for discharge: Home with assist from family.   Rationale for recommendations: Anticipate pt will be able to discharge home with family for assist, will continue to assess if pt needs any home PT.        Entered by: Tabby Barakat 08/02/2019 5:25 PM

## 2019-08-02 NOTE — PROGRESS NOTES
[facility] Progress Note - Hospitalist Service       Date of Admission:  7/29/2019  Assessment & Plan         1. SP L4-L5, L5S1 Fusion, POD#4  -Post operative mild hypotension now resolved. Stop IV fluids   - Perioperative blood loss at 1300 mls. Post op Hgb at 9.1.    -transfuse for Hb less than 7\  Has known bilateral numbness in her thighs. Primary team aware. Issues with mobilizing due to both bilatreal numbness and pain   PCA pump stopped t( 7/31). Transition to oral pain medications    Discussed starting Lyrica TID as she has failed Gabapentin in the past with encephalopathy associated with Gabapentin  - Remainder of the management as per primary team      2. HTN:    Continue to hold lisinopril-hydrochlorothiazide as patient continues to have relative asymptomatic hypotension     3. DM-2:   PTA diet controlled.  She got 8 mg decadran.   Given stable blood sugars, Stopped sliding scale insulin and limit blood sugar checks to twice daily  Stable blood sugars     4. Vit D def and Osteoporosis:   On Vit D supplements 81581 units every week    5. R&Y Gastric Bypass:   Avoid NSAIDs. She was on PPI for long time. Recently has been stopped. She is using OTC Zantac . We will schedule it here.     6. Hyponatremia: ( Resolved)   Likely due to hypovolemia, which resolved with use of IV fluids     7. Anxiety and Depression:   Stable   Continue  paxil        Diet: Advance Diet as Tolerated: Regular Diet Adult  Diet    DVT Prophylaxis: Pneumatic Compression Devices  Schuster Catheter: not present  Code Status: Full Code      Disposition Plan   Expected discharge: Per primary team   Entered: Riaz Spear MD 08/02/2019, 10:55 AM       The patient's care was discussed with the Bedside Nurse, Care Coordinator/, Patient and Primary team.    Riaz Spear MD  Hospitalist Service  [facility],  [place]    ______________________________________________________________________    Interval History   Yun is still troubles by biulatreal thigh numbness limiting her from pursuing activity   She is fdrustrated with it   She deneis chest painb/ SOB   No fever or chills     Data reviewed today: I reviewed all medications, new labs and imaging results over the last 24 hours. I personally reviewed no images or EKG's today.    Physical Exam   Vital Signs: Temp: 97.4  F (36.3  C) Temp src: Oral BP: 104/68 Pulse: 93   Resp: 14 SpO2: 98 % O2 Device: None (Room air)    Weight: 198 lbs 13.68 oz  General Appearance: Awake, alert and not in distress  Respiratory: Normal breath sounds bilaterally   Cardiovascular: Normal heart sounds   GI: Soft, non tender. Normal bowel sounds   Skin: No bruising / bleeding   Other: Drain present. Moving both legs with symmetric strength and power     Data   Recent Labs   Lab 07/30/19  0638 07/29/19  2206 07/29/19  2048 07/29/19  1518 07/29/19  1230   WBC 8.1  --   --   --   --    HGB 9.1* 9.8*  --  11.5* 10.8*   MCV 89  --   --   --   --      --   --   --   --      --  134 129* 130*   POTASSIUM 4.2  --   --  4.8 4.3   CHLORIDE 102  --   --   --   --    CO2 28  --   --   --   --    BUN 15  --   --   --   --    CR 0.97  --   --   --   --    ANIONGAP 5  --   --   --   --    MARY JO 7.5*  --   --   --   --    *  --   --  192* 202*

## 2019-08-02 NOTE — PLAN OF CARE
Discharge Planner OT   Patient plan for discharge: wanted TCU, but found out it is not covered. Now planning for home in a few days  Current status: Supine>sit with SBA and increased time, effort. Patient ambulated to/from bathroom using FWW with SBA, moving slowly. Completed toilet transfer from standard toilet with SBA and light use of grab bars. Sitting up in spine chair at end of session, call light in reach.   Barriers to return to prior living situation: no assist during day, below baseline with mobility and ADLs, pain, post op precautions  Recommendations for discharge: TCU, however patient does not have TCU benefits. Plan is for home, would benefit from home PT/OT follow up  Rationale for recommendations: will continue with OT to maximize safety and IND with functional mobility and ADLs       Entered by: DEEPA CHOUDHURY 08/02/2019 12:55 PM

## 2019-08-02 NOTE — PLAN OF CARE
PT: CX AM session; pt getting ready to go to CT.  Declined OOB at this time d/t fatigue and wanting to save energy for her CT this morning.  Will see pt in PM session.

## 2019-08-02 NOTE — DISCHARGE INSTRUCTIONS
IF PATIENT DISCHARGES OVER WEEKEND_W/E STAFF PLEASE FAX SIGNED ORDERS TO:    [facility]   Phone:1 [phone]   Fax: 1 [phone]

## 2019-08-02 NOTE — PLAN OF CARE
VS:   Stable, afebrile. Sats 96% on RA   Output:   Voiding urine well.  Hemvac ouput =10ml,  had small soft BM x1  after giving dulcolax supp.   Activity:   Up with SBA or A1 and walker.  Ambulated Bathroom    Skin: Small scabbing on face and ecchymosis around  left lateral abdomen   Pain:   Better controlled with increased PO dilaudid dose.  PO dilaudid 6mg given q3hrs, flexeril 10mg given x1. IV dilaudid 0.3mg given x1. Refused ice pack. Lidoderm patches off. Pt refused ice pack   Neuro/CMS:   No change, still has bilateral anterior thigh numbness   Dressing(s):   CDI.   Diet:   Tolerates regular diet well   LDA:   HemoVac, IVSL   Equipment:   PCD's.    Plan:   Continue to monitor with plan of care and manage pain   Additional Info:   Standing lumbar x-ray done today.    .

## 2019-08-02 NOTE — TELEPHONE ENCOUNTER
PA Initiation    Medication: Lyrica 50mg capsules  Insurance Company: [employer] - Phone [phone] Fax [phone]  Pharmacy Filling the Rx: n/a - Patient has not yet been discharged, and there are no outpatient orders for this medication yet  Start Date: 8/2/2019  Comment: Patient has previously tried and failed Gabapentin--caused confusion and encephalopathy.  [facility] Key: [id number] - PA Case ID: [id number]    [facility] Discharge Pharmacy Liaison     [facility]  [address] [place] [address]  [address] [place] [address] [address] [address]   [email or web address]  [email or web address]   Phone: [phone]  Pager: [phone]  Fax: [phone]

## 2019-08-02 NOTE — PROGRESS NOTES
"Orthopaedic Surgery Progress Note:  08/02/2019     Subjective:   Postoperative day 4.  No acute issues overnight.  Pain continues to be an issue during ambulation.  In addition to this the patient feels that her legs are weak during ambulation, although we discussed that during exam she has intact strength.  She is frustrated about her insurance not covering TCU at discharge, she feels that she will need this for a while.  Does not feel that she can discharge home at this time.  She denies any chest pain, shortness of breath, fevers or chills.    Objective:   /68 (BP Location: Right arm)   Pulse 93   Temp 97.4  F (36.3  C) (Oral)   Resp 14   Ht 1.727 m (5' 8\")   Wt 90.2 kg (198 lb 13.7 oz)   SpO2 98%   BMI 30.24 kg/m    No intake/output data recorded.  Gen: NAD. Resting comfortably in bed  Resp: Breathing comfortably on RA  Drain:   Deep drain output of 90/30 at 24/7 hours, respectively.  Musculoskeletal: Dressings anteriorly and posteriorly are clean dry and intact, no evidence of erythema fluctuance or drainage.  The abdomen is soft without evidence of peritoneal signs.  Very small amount of sanguinous fluid in drain.      Lower extremities:  Motor Strength Right Left   Hip flexion: L1, L2, L3 5/5 5/5   Hip adduction: L2, L3 5/5 5/5   Knee flexion: S1 5/5 5/5   Knee extension: L3, L4 4/5 4/5   Ankle dosiflexion: L4, L5 5/5 5/5   EHL: L5 5/5 5/5   Ankle plantarflexion: S1 5/5 5/5     Sensation from L1-S2 is preserved.    Labs:  Lab Results   Component Value Date    WBC 8.1 07/30/2019    HGB 9.1 (L) 07/30/2019     07/30/2019        Assessment & Plan:   Yun Sagastume is a 54 year old female with lumbar spine spondylosis and saggital malalignment, s/p L4-5 and L5-S1 ALIF as well as PLIF during a two stage procedure with Dr Carrion on 7/30/2019.    Postoperatively the patient is struggling with difficulty ambulating.  In addition to this she has had numbness over the bilateral anterior thighs, " no weakness in the bilateral lower extremities.  Physical therapy has recommended TCU at discharge but the patient does not have insurance benefits to cover for this.    Drains to be removed today.    Orthopaedics Primary  Activity: Up with assist until independent. No excessive bending or twisting. No lifting >10 lbs x 6 weeks. No Shelia lift for transfers.   Weight bearing status: WBAT.  Pain management: Transition from IV to PO as tolerated. No NSAIDs   Antibiotics: Ancef for 24 hrs, done.  Diet: NPO until patient has bowel sounds, the begin with clear fluids and progress diet as tolerated.   DVT prophylaxis: SCDs only. No chemical DVT ppx needed.  Imaging: XR Upright  PTDC -done.  Labs: labs PRN  Bracing/Splinting: None.  Dressings: Keep Aquacel c/d/i x 7 days.  Drains: To be removed in August 2  Schuster catheter: Remove POD#1.   Physical Therapy/Occupational Therapy: Eval and treat.  Consults: Hospitalist  Follow-up: Clinic with Dr. Carrion in 6 weeks with repeat x-rays.   Disposition: Pending progress with therapies, pain control on orals, and medical stability.    Orthopaedics surgery staff for this patient is Dr. Carrion.    ------------------------------------------------------------------------------------------    [x] Discontinue PCA  [   ] Drains removed.  [x] Post xrays done.  [x] Discharge orders done.  [x] Discharge summary started.    Jose Jones MD  Orthopaedic Surgery, PGY-4  Pager: [phone]        FOLLOWUP:    Future Appointments   Date Time Provider Department Center   7/30/2019 11:00 AM Tabby Barakat Pt, PT [facility] [place]   7/30/2019  3:30 PM Tabby Barakat Pt, PT [facility] [place]   7/31/2019  7:30 AM Kimberly Gustafson OT [facility] [place]   9/5/2019 10:30 AM Jeremiah Carrion MD [facility]   10/29/2019 10:30 AM Jeremiah Carrion MD [facility]

## 2019-08-02 NOTE — PROGRESS NOTES
Care Coordinator Progress Note    Admission Date/Time:  7/29/2019  Attending MD:  Jeremiah Carrion*    Data  Chart reviewed, discussed with interdisciplinary team.   Patient was admitted for: S/P spinal surgery.    Concerns with insurance coverage for discharge needs: no insurance coverage  for TCU  Current Living Situation: Patient lives with adult child.  Support System: Supportive  Services Involved: No services in home prior to admit  Transportation at Discharge: Car  Transportation to Medical Appointments:   - Not applicable  Barriers to Discharge: Medical Clearance, Pain control    Coordination of Care and Referrals: Provided patient/family with options for Home Care.        Assessment  This RNCC met with patient at bedside to discuss discharge planning, patient is disappointed that her [employer] Insurance product does not cover TCU/SNF. Patient sates she is still having a lot of pain and is frustrated that the numbness in her thighs is still present, this makes it difficult to participate with therapies. Patient is open to home physical therapy and does not have a preference with providers, has some concerns about her 3 large dogs as they have the roam of the house and does not want them around during therapy, however she can schedule therapy around when her son is home so he can control the dogs for her. Patient states her daughter just graduated from college and is a medical assistant and will be staying with her at night as well as providing transport home when discharge is imminent.    Telephone Call To (TCT)   TCT-[facility] in [place], they technically service this area but do not have staff to go to [facility] as this time or anytime soon.  TCT-[facility] [place] -do not have staff until end of next week to service this area  TCT-[facility] - Do not have staff for home PT  TCT-[facility] -this RNCC spoke with states Deysi  they would be able to provide services when patient discharged.    Preliminary orders, H and P, Face Sheet faxed to 1 [phone]     Plan  Anticipated Discharge Date:  2-3 days  Anticipated Discharge Plan:  Home with Home PT and SNV    Polly HOOKERN RN CCM  RN Care Coordinator [address] [place] [id number]  [email or web address]  Office: [phone]  Pager: [phone]    To contact Weekend RNCC, dial * [phone] and enter job code [phone] at prompt. This pager can not be contacted by text page or outside line.

## 2019-08-02 NOTE — PHARMACY
Consulted by Thang Prieto [facility] to run a test claim for Lyrica 25mg & 50mg capsules. Medication is non-preferred and will require prior authorization. Patient has previously failed Gabapentin, which is one of the preferred medications. For further information regarding status of Prior Authorization, please see separate telephone encounter.    Feel free to contact me with any other test claims, prior authorizations, or insurance questions regarding outpatient medications.     Thanks!        Fay Marcum  Centre Discharge Pharmacy Liaison     [facility]  [facility]  [address] [place] [address] [address], [place] [address]   [email or web address]  [email or web address]   Phone: [phone]  Pager: [phone]  Fax: [phone]

## 2019-08-02 NOTE — PROGRESS NOTES
Patient: Yun Sagastume  Date of Service: August 2, 2019 Admission Date:7/29/2019   4 Days Post-Op     Chief Pain Endorsement: back pain, thigh numbness    Recommendations were discussed and relayed to Sherice Parker NP (orthopedics)  Plan was reviewed by the Inpatient Pain Service and staff attending, Dr. Mendez       1. Agree with starting pregabalin 25 mg PO TID.  Would continue this dose x2 days then if tolerating would increase to pregabalin 50 mg PO TID scheduled.  Monitor for sedation, mental fogginess as patient had these previously with gabapentin.  If tolerating and plan to continue on discharge would recommend reaching out to pharmacy liaison to inquire about insurance coverage of Lyrica on discharge.   2. Discontinue hydromorphone PO patient feels this is less effective than her home oxycodone   3. Start oxycodone 15 mg PO q3hr PRN moderate-severe pain  4. Continue hydromorphone 0.3-0.5 mg IV every 3hr PRN breakthrough pain not controlled by orals or prior to PT/OT sessions.    5. Increase acetaminophen to 1000 mg PO QID.   6. Continue Lidocaine 4% patches, 1-3 patch(es) transdermal every 24 hours (12 hours on and 12 hours off)  7. Continue aqua K pad   1. Continue cyclobenzaprine 10mg by mouth three times daily as needed muscle spasms   8. Bowel regimen per primary team to prevent opioid induced constipation.    Pain Service will Continue to follow at this time.     Thank you for consulting the Inpatient Pain Management Service. The above recommendations are to be acted upon at the primary team s discretion.     To reach us:  Mon - Friday 8 AM - 3 PM: Pager [phone] (Text Page)  After hours, weekends and holidays: Primary service should call [phone] for the on-call pain specialist    PAIN MEDICATION SAFE USE:   Prior to discharge instruct patient on the following in addition to the medication fact sheet:    Caution: these medications can cause sedation    Take prescription medicine only if  it has been prescribed by your doctor    Do not take more medicine or take it more often than instructed     Call your doctor if pain gets worse    Never mix pain medicine with alcohol, sleeping pills, or any illicit drugs    Do not operate heavy machinery, including vehicles, when initiation opioid therapy or increasing dosage    Store prescription opioids in a locked container, whenever possible     Dispose of unused opioids appropriately     Do not stop abruptly once at higher doses.  These medications must be tapered off.    Opioid pain medications do carry the risk for physical dependence and addiction and patients should be counseled about this.        Acute post op low back pain s/p L4-5 and L5-S1 ALIF as well as PLIF during a two stage procedure with Dr Carrion and Dr. Becky Grant on 7/29/19 due to acquired kyphosis, lumbar spondylosis, lumbar radicular pain.  Pain still present, feels worse than 2 days prior.  Also still dealing with BL thigh numbness (team aware).     History of laminectomy 2011 at L4-5 with complications of post laminectomy syndrome    Neuropathy in BL toes and hands    DM2     S/p Buster-en-Y    BMI 30    History of MDD    Chronic pain syndrome - follows [facility] - patient stopped ms contin 15mg twice daily in May in preparation for this surgery    Opioid tolerant      -- Outpatient opioid requirements prior to admission:   -- oxyCODONE 10mg; Qty 120; DS 30; filled 7/3/19  -- ms contin 15mg; Qty 60; DS 30; filled 5/9/19     Primary Care Provider: Mary Ellen Salgado  Chronic Pain Provider: [facility]    Interval History:  Yun Sagastume was seen today (August 2, 2019) and she reports that her pain is worse today and rates as a 9/10 on 0-10 numeric scale.  She reports that the most disturbing thing to her is her BL thigh numbness and that because of this she hast to strain harder to move her legs and transfer which in turn causes worsening back and BL hip pain.  She  is seen today and is alert, oriented and interactive throughout the interview.  She is up in the chair and eating breakfast.  She does not feel the hydromorphone PO is that effective and would like to transition back to her oxycodone which she was on prior to admission but will do so at a higher dose and more frequent interval.  We discussed her reaction to gabapentin and the decision to stop this a couple days ago.  Patient at that time was more comfortable and was reporting she did not want to be on gabapentin, now given the increase in pain related to the numbness in her BL thighs and difficulty with transfers/ambulation she would like something for this pain and we agreed trial of lyrica was a good idea.  Later found out that patient had already been started on this by primary team.  Her last bowel movement was yesterday after a suppository.      FUNCTIONAL STATUS:  Change:      improving  Oral intake:     Regular  Activity level:     Up with assistance ambulating in room, up in chair,  Ambulating in anne with walker  Mood:      Stable      -- Inpatient Medications Related to Pain Management:   Medications related to Pain Management (From now, onward)    Start     Dose/Rate Route Frequency Ordered Stop    08/02/19 0930  pregabalin (LYRICA) capsule 25 mg      25 mg Oral 3 TIMES DAILY 08/02/19 0915      08/01/19 0016  HYDROmorphone (DILAUDID) tablet 4-6 mg      4-6 mg Oral EVERY 3 HOURS PRN 08/01/19 0019      08/01/19 0000  acetaminophen (TYLENOL) tablet 650 mg      650 mg Oral EVERY 4 HOURS PRN 07/29/19 1746      08/01/19 0000  HYDROmorphone (DILAUDID) 4 MG tablet      4-6 mg Oral EVERY 6 HOURS PRN 08/01/19 1525      08/01/19 0000  polyethylene glycol (MIRALAX/GLYCOLAX) packet      17 g Oral 2 TIMES DAILY 08/01/19 1525      08/01/19 0000  senna-docusate (SENOKOT-S/PERICOLACE) 8.6-50 MG tablet      1 tablet Oral 2 TIMES DAILY 08/01/19 1525      07/31/19 1800  acetaminophen (TYLENOL) tablet 975 mg      975 mg Oral  EVERY 8 HOURS 07/31/19 1013      07/31/19 1015  Lidocaine (LIDOCARE) 4 % Patch 1-3 patch      1-3 patch  over 12 Hours Transdermal EVERY 24 HOURS 0800 07/31/19 1013      07/31/19 1015  lidocaine patch in PLACE       Transdermal EVERY 8 HOURS 07/31/19 1013      07/31/19 1013  HYDROmorphone (PF) (DILAUDID) injection 0.3-0.5 mg      0.3-0.5 mg Intravenous EVERY 3 HOURS PRN 07/31/19 1013      07/30/19 2000  polyethylene glycol (MIRALAX/GLYCOLAX) Packet 17 g      17 g Oral 2 TIMES DAILY 07/30/19 1407      07/29/19 2000  senna-docusate (SENOKOT-S/PERICOLACE) 8.6-50 MG per tablet 1 tablet      1 tablet Oral 2 TIMES DAILY 07/29/19 1845 07/29/19 2000  senna-docusate (SENOKOT-S/PERICOLACE) 8.6-50 MG per tablet 2 tablet      2 tablet Oral 2 TIMES DAILY 07/29/19 1845 07/29/19 2000  docusate sodium (COLACE) capsule 200 mg      200 mg Oral 2 TIMES DAILY 07/29/19 1845      07/29/19 1845  lidocaine 1 % 0.1-1 mL      0.1-1 mL Other EVERY 1 HOUR PRN 07/29/19 1845      07/29/19 1845  lidocaine (LMX4) cream       Topical EVERY 1 HOUR PRN 07/29/19 1845      07/29/19 1845  bisacodyl (DULCOLAX) Suppository 10 mg      10 mg Rectal DAILY PRN 07/29/19 1845 07/29/19 1746  cyclobenzaprine (FLEXERIL) tablet 10 mg      10 mg Oral 3 TIMES DAILY PRN 07/29/19 1746      07/29/19 1746  hydrOXYzine (ATARAX) tablet 25 mg      25 mg Oral EVERY 6 HOURS PRN 07/29/19 1746            LAB DATA:  Recent Labs   Lab 07/30/19  0638 07/29/19  2206 07/29/19  1518   CR 0.97  --   --    WBC 8.1  --   --    HGB 9.1* 9.8* 11.5*         ----------------------------------------------------------------------------------  Thang Prieto, [facility]  Inpatient Pain Service     To reach us:  Mon - Friday 8 AM - 3 PM: Pager [phone] (Text Page)  After hours, weekends and holidays: Primary service should call [phone] for the on-call pain specialist    Helpful Resources:  Getting Rid of Unwanted Medications (printable PDF for patients)   Opioid Overdose  Prevention Toolkit (printable PDF for patients)   Prescription Opioids: What You Need To Know (printable PDF for patients)

## 2019-08-02 NOTE — PLAN OF CARE
"      VS:   /68 (BP Location: Right arm)   Pulse 93   Temp 97.4  F (36.3  C) (Oral)   Resp 14   Ht 1.727 m (5' 8\")   Wt 90.2 kg (198 lb 13.7 oz)   SpO2 98%   BMI 30.24 kg/m     Output:   Spontaneously voiding. Last BM 8/1/19    Lungs LS clear equal bilaterally   Denies SOB/CP    Activity:   SBA w/ walker   Skin: Intact ex: Incisions    Pain:   Pain meds switched to PRN oral Oxycodone q 3hrs. Breakthrough IV Dilaudid, Lyrica TID   Neuro/CMS:   A&Ox4  numbness bilateral thighs    Dressing(s):   CDI    Diet:   Regular: good appetite   Denies N/V    LDA:   PIV SL   Hemovac removed this shift 10ml output   Equipment:   Walker   Plan:   TCU is not covered by insurance, Plan to discharge home in a few days. Continue to monitor Pain    Additional Info:   CT performed this afternoon.        "

## 2019-08-03 ENCOUNTER — APPOINTMENT (OUTPATIENT)
Dept: PHYSICAL THERAPY | Facility: CLINIC | Age: 54
End: 2019-08-03
Attending: ORTHOPAEDIC SURGERY
Payer: COMMERCIAL

## 2019-08-03 ENCOUNTER — APPOINTMENT (OUTPATIENT)
Dept: OCCUPATIONAL THERAPY | Facility: CLINIC | Age: 54
End: 2019-08-03
Attending: ORTHOPAEDIC SURGERY
Payer: COMMERCIAL

## 2019-08-03 LAB
GLUCOSE BLDC GLUCOMTR-MCNC: 129 MG/DL (ref 70–99)
GLUCOSE BLDC GLUCOMTR-MCNC: 131 MG/DL (ref 70–99)
GLUCOSE BLDC GLUCOMTR-MCNC: 133 MG/DL (ref 70–99)
GLUCOSE BLDC GLUCOMTR-MCNC: 133 MG/DL (ref 70–99)

## 2019-08-03 PROCEDURE — 25000132 ZZH RX MED GY IP 250 OP 250 PS 637: Performed by: STUDENT IN AN ORGANIZED HEALTH CARE EDUCATION/TRAINING PROGRAM

## 2019-08-03 PROCEDURE — 97530 THERAPEUTIC ACTIVITIES: CPT | Mod: GO | Performed by: OCCUPATIONAL THERAPIST

## 2019-08-03 PROCEDURE — 40000193 ZZH STATISTIC PT WARD VISIT

## 2019-08-03 PROCEDURE — 00000146 ZZHCL STATISTIC GLUCOSE BY METER IP

## 2019-08-03 PROCEDURE — 12000001 ZZH R&B MED SURG/OB UMMC

## 2019-08-03 PROCEDURE — 97535 SELF CARE MNGMENT TRAINING: CPT | Mod: GO | Performed by: OCCUPATIONAL THERAPIST

## 2019-08-03 PROCEDURE — 99232 SBSQ HOSP IP/OBS MODERATE 35: CPT | Performed by: INTERNAL MEDICINE

## 2019-08-03 PROCEDURE — 25000128 H RX IP 250 OP 636: Performed by: NURSE PRACTITIONER

## 2019-08-03 PROCEDURE — 25000132 ZZH RX MED GY IP 250 OP 250 PS 637: Performed by: PHYSICIAN ASSISTANT

## 2019-08-03 PROCEDURE — 97116 GAIT TRAINING THERAPY: CPT | Mod: GP

## 2019-08-03 PROCEDURE — 25000132 ZZH RX MED GY IP 250 OP 250 PS 637: Performed by: NURSE PRACTITIONER

## 2019-08-03 RX ADMIN — RANITIDINE 150 MG: 150 TABLET ORAL at 22:21

## 2019-08-03 RX ADMIN — OXYCODONE HYDROCHLORIDE 15 MG: 5 TABLET ORAL at 13:00

## 2019-08-03 RX ADMIN — OXYCODONE HYDROCHLORIDE 15 MG: 5 TABLET ORAL at 23:22

## 2019-08-03 RX ADMIN — RANITIDINE 150 MG: 150 TABLET ORAL at 06:57

## 2019-08-03 RX ADMIN — DOCUSATE SODIUM 200 MG: 100 CAPSULE, LIQUID FILLED ORAL at 08:50

## 2019-08-03 RX ADMIN — PAROXETINE HYDROCHLORIDE HEMIHYDRATE 60 MG: 20 TABLET, FILM COATED ORAL at 08:50

## 2019-08-03 RX ADMIN — SENNOSIDES AND DOCUSATE SODIUM 2 TABLET: 8.6; 5 TABLET ORAL at 08:50

## 2019-08-03 RX ADMIN — ACETAMINOPHEN 975 MG: 325 TABLET, FILM COATED ORAL at 04:02

## 2019-08-03 RX ADMIN — OXYCODONE HYDROCHLORIDE 15 MG: 5 TABLET ORAL at 16:31

## 2019-08-03 RX ADMIN — HYDROXYZINE HYDROCHLORIDE 25 MG: 25 TABLET ORAL at 16:35

## 2019-08-03 RX ADMIN — POLYETHYLENE GLYCOL 3350 17 G: 17 POWDER, FOR SOLUTION ORAL at 08:51

## 2019-08-03 RX ADMIN — HYDROXYZINE HYDROCHLORIDE 25 MG: 25 TABLET ORAL at 23:22

## 2019-08-03 RX ADMIN — PREGABALIN 25 MG: 25 CAPSULE ORAL at 08:51

## 2019-08-03 RX ADMIN — PREGABALIN 25 MG: 25 CAPSULE ORAL at 14:18

## 2019-08-03 RX ADMIN — OXYCODONE HYDROCHLORIDE 15 MG: 5 TABLET ORAL at 06:57

## 2019-08-03 RX ADMIN — HYDROXYZINE HYDROCHLORIDE 25 MG: 25 TABLET ORAL at 10:10

## 2019-08-03 RX ADMIN — CYCLOBENZAPRINE HYDROCHLORIDE 10 MG: 5 TABLET, FILM COATED ORAL at 13:00

## 2019-08-03 RX ADMIN — PREGABALIN 25 MG: 25 CAPSULE ORAL at 22:22

## 2019-08-03 RX ADMIN — OXYCODONE HYDROCHLORIDE 15 MG: 5 TABLET ORAL at 19:12

## 2019-08-03 RX ADMIN — OXYCODONE HYDROCHLORIDE 15 MG: 5 TABLET ORAL at 10:09

## 2019-08-03 RX ADMIN — HYDROXYZINE HYDROCHLORIDE 25 MG: 25 TABLET ORAL at 04:39

## 2019-08-03 RX ADMIN — CYCLOBENZAPRINE HYDROCHLORIDE 10 MG: 5 TABLET, FILM COATED ORAL at 06:57

## 2019-08-03 RX ADMIN — ACETAMINOPHEN 975 MG: 325 TABLET, FILM COATED ORAL at 11:29

## 2019-08-03 RX ADMIN — OXYCODONE HYDROCHLORIDE 15 MG: 5 TABLET ORAL at 00:57

## 2019-08-03 RX ADMIN — HYDROMORPHONE HYDROCHLORIDE 0.5 MG: 1 INJECTION, SOLUTION INTRAMUSCULAR; INTRAVENOUS; SUBCUTANEOUS at 22:23

## 2019-08-03 RX ADMIN — ACETAMINOPHEN 975 MG: 325 TABLET, FILM COATED ORAL at 21:55

## 2019-08-03 RX ADMIN — OXYCODONE HYDROCHLORIDE 15 MG: 5 TABLET ORAL at 04:02

## 2019-08-03 RX ADMIN — CYCLOBENZAPRINE HYDROCHLORIDE 10 MG: 5 TABLET, FILM COATED ORAL at 21:59

## 2019-08-03 ASSESSMENT — ACTIVITIES OF DAILY LIVING (ADL)
ADLS_ACUITY_SCORE: 14

## 2019-08-03 NOTE — PLAN OF CARE
"Discharge Planner PT   Patient plan for discharge: home with assist from family  Current status: Patient continues to require assist for bed mobility and transfers d/t pain and weakness. Ambulates slowly with FWW. Patient refusing to attempt stairs, does not feel she can lift her legs high enough to perform stairs at this time, and plans to have son create a ramp and push her up in a \"rolling walker with a seat.\"   Barriers to return to prior living situation: medical status, assist needed with bed mobility and transfers, stairs, home alone during day  Recommendations for discharge: TCU, however patient does not have TCU benefits. Plan is for home, would benefit from home PT/OT follow up  Rationale for recommendations: Patient would benefit from skilled PT to address bed mobility and transfers, and progress ambulation and safety with mobility.        Entered by: Stacey Morillo 08/03/2019 12:38 PM       "

## 2019-08-03 NOTE — PLAN OF CARE
"      VS:   /72 (BP Location: Right arm)   Pulse 86   Temp 97.4  F (36.3  C) (Oral)   Resp 16   Ht 1.727 m (5' 8\")   Wt 90.2 kg (198 lb 13.7 oz)   SpO2 96%   BMI 30.24 kg/m       Output:   Voiding spontaneously without difficulty, LBM 8/2/19. Passing gas   Lungs CTA   Activity:   Up with SBA and walker.    Skin: Intact ex incisions   Pain:   Controlled with Q3 oxy and atarax   Neuro/CMS:   A&Ox3, numbness in bilateral thighs   Dressing(s):   CDI   Diet:   Regular   LDA:   NO PIV access   Equipment:   Walker, PCD's   Plan:   Continue pain regimen. Discharge to home when medically cleared. Able to make needs known and call light within reach.   Additional Info:          "

## 2019-08-03 NOTE — PROGRESS NOTES
[facility], [facility] Progress Note - Hospitalist Service       Date of Admission:  7/29/2019  Assessment & Plan         1. SP L4-L5, L5S1 Fusion, POD#5  -Post operative mild hypotension now resolved. Stop IV fluids   - Perioperative blood loss at 1300 mls. Post op Hgb at 9.1.    -transfuse for Hb less than 7\  Has known bilateral numbness in her thighs. Primary team aware. Issues with mobilizing due to both bilatreal numbness and pain   PCA pump stopped t( 7/31). Transition to oral pain medications. Patient has stopped taking Dilaudid and now on Oxycodone Q 3 hrs with much better pain control   Lyrica 25 mg  TID initiated  On 8/2  as she has failed Gabapentin in the past with encephalopathy associated with Gabapentin  - Remainder of the management as per primary team      2. HTN:    Continue to hold lisinopril-hydrochlorothiazide as patient continues to have relative asymptomatic hypotension     3. DM-2:   PTA diet controlled.  She got 8 mg decadran.   Given stable blood sugars, Stopped sliding scale insulin and limit blood sugar checks to twice daily  Stable blood sugars     4. Vit D def and Osteoporosis:   On Vit D supplements 80671 units every week    5. R&Y Gastric Bypass:   Avoid NSAIDs. She was on PPI for long time. Recently has been stopped. She is using OTC Zantac . We will schedule it here.     6. Hyponatremia: ( Resolved)   Likely due to hypovolemia, which resolved with use of IV fluids     7. Anxiety and Depression:   Stable   Continue  paxil        Diet: Advance Diet as Tolerated: Regular Diet Adult  Diet    DVT Prophylaxis: Pneumatic Compression Devices  Schuster Catheter: not present  Code Status: Full Code      Disposition Plan   Expected discharge: Per primary team   Entered: Riaz Spear MD 08/03/2019, 9:11 AM       The patient's care was discussed with the Bedside Nurse, Care Coordinator/, Patient and Primary team.    Riaz  Maddie Spear MD  Hospitalist Service  [facility], [place]    ______________________________________________________________________    Interval History   Yun feels much better today. No new complaints  Eating and drinking well  Pain much better with Oxycodone as she did not tolerate dilaudid well  Slept well last night  Bilateral thigh numbness still present     Data reviewed today: I reviewed all medications, new labs and imaging results over the last 24 hours. I personally reviewed no images or EKG's today.    Physical Exam   Vital Signs: Temp: 98.2  F (36.8  C) Temp src: Oral BP: 104/63 Pulse: 86 Heart Rate: 81 Resp: 16 SpO2: 100 % O2 Device: None (Room air)    Weight: 198 lbs 13.68 oz  General Appearance: Awake, alert and not in distress  Respiratory: Normal breath sounds bilaterally   Cardiovascular: Normal heart sounds   GI: Soft, non tender. Normal bowel sounds   Skin: No bruising / bleeding   Other:. Moving both legs with symmetric strength and power     Data   Recent Labs   Lab 07/30/19  0638 07/29/19  2206 07/29/19  2048 07/29/19  1518 07/29/19  1230   WBC 8.1  --   --   --   --    HGB 9.1* 9.8*  --  11.5* 10.8*   MCV 89  --   --   --   --      --   --   --   --      --  134 129* 130*   POTASSIUM 4.2  --   --  4.8 4.3   CHLORIDE 102  --   --   --   --    CO2 28  --   --   --   --    BUN 15  --   --   --   --    CR 0.97  --   --   --   --    ANIONGAP 5  --   --   --   --    MARY JO 7.5*  --   --   --   --    *  --   --  192* 202*

## 2019-08-03 NOTE — PLAN OF CARE
"      VS:   Blood pressure 122/73, pulse 86, temperature 97.8  F (36.6  C), temperature source Oral, resp. rate 16, height 1.727 m (5' 8\"), weight 90.2 kg (198 lb 13.7 oz), SpO2 95 %.     Output:   Voids spontaneously w/o difficulty. LBM 8/1, small, bowel meds given.   Lungs LS clear. Denies SOB. Room air.   Activity:   Up with SBA and walker.   Skin: Intact except for incisions.   Pain:   Oxycodone 15 mg q3h. IV dilauded 0.5 mg given x2 for breakthrough pain. Atarax given x1. Flexeril given x1. Ice to knees.    Neuro/CMS:   AxOx4, forgetful at times. Numbness to bilateral thighs.   Dressing(s):   CDI.   Diet:   Regular, tolerates. Denies N/V.   LDA:   PIV S/L.   Equipment:   Walker, Iv pole, PCDs.   Plan:   discharge to home in a few days. Manage pain.   Additional Info:        "

## 2019-08-03 NOTE — PLAN OF CARE
"      VS:   /63 (BP Location: Right arm)   Pulse 86   Temp 98.2  F (36.8  C) (Oral)   Resp 16   Ht 1.727 m (5' 8\")   Wt 90.2 kg (198 lb 13.7 oz)   SpO2 100%   BMI 30.24 kg/m     Output:   Spontaneously voiding in bathroom.    Lungs LS clear equal bilaterally    Activity:   SBA with walker    Skin: Intact ex: incisions and scab on R cheek    Pain:   Pain managed with PRN oral Oxycodone and alternating Atarax and Flexeril    Neuro/CMS:   A&Ox4   Numbness present in thighs    Dressing(s):   CDI abdominal liquid bandage and Spine incisional drsg CDI    Diet:   Regular   LDA:   None    Equipment:   Walker    Plan:   Continue POC. Discharge to home when medically cleared. Able to make needs known and call light within reach.   Additional Info:          "

## 2019-08-03 NOTE — PLAN OF CARE
Discharge Planner OT   Patient plan for discharge: Home with assist  Current status: Pt educated on LB dressing using AE, able to doff socks, but unable to perform figure 4 technique. Discussed using sock aide. Pt declined practicing shower transfer as reports her thighs are numb and she does not feel ready. Pt able to ambulate in hallway ~50' w/ FWW and SBA. Moving very slow. Able to state 3/3 spine precautions.  Barriers to return to prior living situation: pain, decreased activity tolerance, post op precautions, limited assist during the day  Recommendations for discharge: Home with assist, would benefit from home OT/PT follow up  Rationale for recommendations: To maximize safety and IND with ADLs and functional mobility.       Entered by: Dorian Montalvo 08/03/2019 12:43 PM

## 2019-08-03 NOTE — PROGRESS NOTES
"Orthopaedic Surgery Progress Note:  08/03/2019     Subjective:   Postoperative day 5.  No acute issues overnight.  Pain control improved some overnight, currently comfortable.  Difficulty with pain control while ambulating.  Does not feel able to discharge yet.  Denies any tingling or numbness.  Tolerating a diet.  Bowels and bladder working without issue.  She denies any chest pain, shortness of breath, fevers or chills.    Objective:   /72 (BP Location: Right arm)   Pulse 86   Temp 97.4  F (36.3  C) (Oral)   Resp 16   Ht 1.727 m (5' 8\")   Wt 90.2 kg (198 lb 13.7 oz)   SpO2 96%   BMI 30.24 kg/m    No intake/output data recorded.  Gen: NAD. Resting comfortably in bed  Resp: Breathing comfortably on RA  Drain: Removed 8/2  Musculoskeletal: Dressings anteriorly and posteriorly are clean dry and intact, no evidence of erythema fluctuance or drainage.  The abdomen is soft without evidence of peritoneal signs.  DP/PT 2+ and symmetric .        Lower extremities:  Motor Strength Right Left   Hip flexion: L1, L2, L3 5/5 5/5   Hip adduction: L2, L3 5/5 5/5   Knee flexion: S1 5/5 5/5   Knee extension: L3, L4 4/5 4/5   Ankle dosiflexion: L4, L5 5/5 5/5   EHL: L5 5/5 5/5   Ankle plantarflexion: S1 5/5 5/5     Sensation from L1-S2 is preserved.    Labs:  Lab Results   Component Value Date    WBC 8.1 07/30/2019    HGB 9.1 (L) 07/30/2019     07/30/2019        Assessment & Plan:   Yun Sagastume is a 54 year old female with lumbar spine spondylosis and saggital malalignment, s/p L4-5 and L5-S1 ALIF as well as PLIF during a two stage procedure with Dr Carrion on 7/30/2019.    Postoperatively the patient is struggling with difficulty ambulating, but is showing improvement.  In addition to this she has had numbness over the bilateral anterior thighs, unchanged, no weakness in the bilateral lower extremities.  Physical therapy has recommended TCU at discharge but the patient does not have insurance benefits to " cover for this.      Orthopaedics Primary  Activity: Up with assist until independent. No excessive bending or twisting. No lifting >10 lbs x 6 weeks. No Shelia lift for transfers.   Weight bearing status: WBAT.  Pain management: Transition from IV to PO as tolerated. No NSAIDs   Antibiotics: Ancef for 24 hrs, done.  Diet: Begin with clear fluids and progress diet as tolerated.   DVT prophylaxis: SCDs only. No chemical DVT ppx needed.  Imaging: XR Upright  PTDC -done.  Ultrasound: Daily doppler PT/DP pulses  Labs: Labs PRN  Bracing/Splinting: None.  Dressings: Keep Aquacel c/d/i x 7 days.  Drains: To be removed in August 2  Schuster catheter: Remove POD#1.   Physical Therapy/Occupational Therapy: Eval and treat.  Consults: Hospitalist  Follow-up: Clinic with Dr. Carrion in 6 weeks with repeat x-rays.   Disposition: Pending progress with therapies, pain control on orals, and medical stability.    Orthopaedics surgery staff for this patient is Dr. Carrion.    ------------------------------------------------------------------------------------------    [x] Discontinue PCA  [x] Drains removed.  [x] Post xrays done.  [x] Discharge orders done.  [x] Discharge summary started.        Brett Yin MD  Orthopedic Surgery, PGY-4  Pager: [phone]  6:21 AM  August 3, 2019      FOLLOWUP:    Future Appointments   Date Time Provider Department Center   7/30/2019 11:00 AM Tabby Barakat Pt, PT [facility] [place]   7/30/2019  3:30 PM Tabby Barakat Pt, PT [facility] [place]   7/31/2019  7:30 AM Kimberly Gustafson OT [facility] [place]   9/5/2019 10:30 AM Jeremiah Carrion MD [facility]   10/29/2019 10:30 AM Jeremiah Carrion MD [facility]

## 2019-08-04 ENCOUNTER — APPOINTMENT (OUTPATIENT)
Dept: OCCUPATIONAL THERAPY | Facility: CLINIC | Age: 54
End: 2019-08-04
Attending: ORTHOPAEDIC SURGERY
Payer: COMMERCIAL

## 2019-08-04 ENCOUNTER — APPOINTMENT (OUTPATIENT)
Dept: PHYSICAL THERAPY | Facility: CLINIC | Age: 54
End: 2019-08-04
Attending: ORTHOPAEDIC SURGERY
Payer: COMMERCIAL

## 2019-08-04 LAB
GLUCOSE BLDC GLUCOMTR-MCNC: 125 MG/DL (ref 70–99)
GLUCOSE BLDC GLUCOMTR-MCNC: 141 MG/DL (ref 70–99)
GLUCOSE BLDC GLUCOMTR-MCNC: 246 MG/DL (ref 70–99)

## 2019-08-04 PROCEDURE — 40000193 ZZH STATISTIC PT WARD VISIT

## 2019-08-04 PROCEDURE — 99231 SBSQ HOSP IP/OBS SF/LOW 25: CPT | Performed by: INTERNAL MEDICINE

## 2019-08-04 PROCEDURE — 25000128 H RX IP 250 OP 636: Performed by: NURSE PRACTITIONER

## 2019-08-04 PROCEDURE — 97110 THERAPEUTIC EXERCISES: CPT | Mod: GP

## 2019-08-04 PROCEDURE — 97530 THERAPEUTIC ACTIVITIES: CPT | Mod: GO

## 2019-08-04 PROCEDURE — 25000132 ZZH RX MED GY IP 250 OP 250 PS 637: Performed by: NURSE PRACTITIONER

## 2019-08-04 PROCEDURE — 97535 SELF CARE MNGMENT TRAINING: CPT | Mod: GO

## 2019-08-04 PROCEDURE — 12000001 ZZH R&B MED SURG/OB UMMC

## 2019-08-04 PROCEDURE — 25000132 ZZH RX MED GY IP 250 OP 250 PS 637: Performed by: PHYSICIAN ASSISTANT

## 2019-08-04 PROCEDURE — 97116 GAIT TRAINING THERAPY: CPT | Mod: GP

## 2019-08-04 PROCEDURE — 00000146 ZZHCL STATISTIC GLUCOSE BY METER IP

## 2019-08-04 PROCEDURE — 25000132 ZZH RX MED GY IP 250 OP 250 PS 637: Performed by: STUDENT IN AN ORGANIZED HEALTH CARE EDUCATION/TRAINING PROGRAM

## 2019-08-04 RX ADMIN — PREGABALIN 25 MG: 25 CAPSULE ORAL at 19:18

## 2019-08-04 RX ADMIN — HYDROXYZINE HYDROCHLORIDE 25 MG: 25 TABLET ORAL at 18:15

## 2019-08-04 RX ADMIN — HYDROMORPHONE HYDROCHLORIDE 0.5 MG: 1 INJECTION, SOLUTION INTRAMUSCULAR; INTRAVENOUS; SUBCUTANEOUS at 18:15

## 2019-08-04 RX ADMIN — DOCUSATE SODIUM 200 MG: 100 CAPSULE, LIQUID FILLED ORAL at 08:24

## 2019-08-04 RX ADMIN — HYDROXYZINE HYDROCHLORIDE 25 MG: 25 TABLET ORAL at 05:16

## 2019-08-04 RX ADMIN — CYCLOBENZAPRINE HYDROCHLORIDE 10 MG: 5 TABLET, FILM COATED ORAL at 23:39

## 2019-08-04 RX ADMIN — POLYETHYLENE GLYCOL 3350 17 G: 17 POWDER, FOR SOLUTION ORAL at 08:25

## 2019-08-04 RX ADMIN — RANITIDINE 150 MG: 150 TABLET ORAL at 06:20

## 2019-08-04 RX ADMIN — PREGABALIN 25 MG: 25 CAPSULE ORAL at 13:46

## 2019-08-04 RX ADMIN — HYDROMORPHONE HYDROCHLORIDE 0.5 MG: 1 INJECTION, SOLUTION INTRAMUSCULAR; INTRAVENOUS; SUBCUTANEOUS at 06:20

## 2019-08-04 RX ADMIN — OXYCODONE HYDROCHLORIDE 15 MG: 5 TABLET ORAL at 22:21

## 2019-08-04 RX ADMIN — OXYCODONE HYDROCHLORIDE 15 MG: 5 TABLET ORAL at 16:10

## 2019-08-04 RX ADMIN — RANITIDINE 150 MG: 150 TABLET ORAL at 18:15

## 2019-08-04 RX ADMIN — OXYCODONE HYDROCHLORIDE 15 MG: 5 TABLET ORAL at 19:18

## 2019-08-04 RX ADMIN — CYCLOBENZAPRINE HYDROCHLORIDE 10 MG: 5 TABLET, FILM COATED ORAL at 16:10

## 2019-08-04 RX ADMIN — OXYCODONE HYDROCHLORIDE 15 MG: 5 TABLET ORAL at 02:17

## 2019-08-04 RX ADMIN — PAROXETINE HYDROCHLORIDE HEMIHYDRATE 60 MG: 20 TABLET, FILM COATED ORAL at 08:24

## 2019-08-04 RX ADMIN — SENNOSIDES AND DOCUSATE SODIUM 2 TABLET: 8.6; 5 TABLET ORAL at 08:24

## 2019-08-04 RX ADMIN — ACETAMINOPHEN 975 MG: 325 TABLET, FILM COATED ORAL at 19:18

## 2019-08-04 RX ADMIN — HYDROXYZINE HYDROCHLORIDE 25 MG: 25 TABLET ORAL at 10:58

## 2019-08-04 RX ADMIN — ACETAMINOPHEN 975 MG: 325 TABLET, FILM COATED ORAL at 12:31

## 2019-08-04 RX ADMIN — ACETAMINOPHEN 975 MG: 325 TABLET, FILM COATED ORAL at 05:16

## 2019-08-04 RX ADMIN — OXYCODONE HYDROCHLORIDE 15 MG: 5 TABLET ORAL at 08:25

## 2019-08-04 RX ADMIN — OXYCODONE HYDROCHLORIDE 15 MG: 5 TABLET ORAL at 12:31

## 2019-08-04 RX ADMIN — CYCLOBENZAPRINE HYDROCHLORIDE 10 MG: 5 TABLET, FILM COATED ORAL at 08:24

## 2019-08-04 RX ADMIN — OXYCODONE HYDROCHLORIDE 15 MG: 5 TABLET ORAL at 05:16

## 2019-08-04 RX ADMIN — PREGABALIN 25 MG: 25 CAPSULE ORAL at 08:24

## 2019-08-04 RX ADMIN — HYDROMORPHONE HYDROCHLORIDE 0.5 MG: 1 INJECTION, SOLUTION INTRAMUSCULAR; INTRAVENOUS; SUBCUTANEOUS at 10:58

## 2019-08-04 ASSESSMENT — ACTIVITIES OF DAILY LIVING (ADL)
ADLS_ACUITY_SCORE: 14

## 2019-08-04 NOTE — PLAN OF CARE
D: Pt  A/O x3. VSS . Lungs- CL, no c/o chest pain/ SOB. sats= 98 % RA. IS up to  2000  . Bowel+, passing gas.. PP- +. +2 edema- feet/legs. CMS- intact. ( pt has numbness of left thigh area.)Pt taking PO  REG food/ fluids well. Voiding Good amounts in BR. Pt up INDEPENDENTLY with walker. In chair for meals.. Pain/CAPA - see other notes   BACK and lt flank area Dressing - C/D/I.   A: con't to monitor. Call light in reach. Pt able to make needs known.

## 2019-08-04 NOTE — PLAN OF CARE
Discharge Planner PT   Patient plan for discharge: home with assist from children  Current status: Patient willing to work with PT for PM session, apologizes for AM cancel and states pain control has been an issue during hospitalization. Tolerated 300' of walking with FWW and supervision. Instructed in standing and seated exercises to improve LE strength for carryover to mobility.  Barriers to return to prior living situation: bed mobility, stairs, alone during day  Recommendations for discharge: TCU but patient does not have TCU benefits; home with PT follow up  Rationale for recommendations: Patient would continue to benefit from skilled PT to address strength and progress toward independent mobility and PLOF.       Entered by: Stacey Morillo 08/04/2019 4:19 PM

## 2019-08-04 NOTE — PLAN OF CARE
Discharge Planner OT   Patient plan for discharge: home (no TCU benefits)  Current status: Patient agreeable to OT. Increased time for all mobility. Supine>sit using log roll with SBA. Patient completed toilet task with mod I. Stood at sink for light self cares with distant supervision. Patient ambulated ~200' in hallway using FWW with SBA. Educated on tub transfer technique using tub bench and leg .   Barriers to return to prior living situation: pain, post op precautions, below baseline with mobility and ADLs  Recommendations for discharge: TCU but no benefits. Home with home OT/PT follow up  Rationale for recommendations: patient is making progress towards OT goals, is increasing IND with ADLs due to longer LOS. Patient would benefit from continued OT to maximize safety and IND with functional mobility and ADLs/IADLs       Entered by: DEEPA CHOUDHURY 08/04/2019 1:06 PM

## 2019-08-04 NOTE — PLAN OF CARE
"Cancel AM PT treatment session. Patient reports she had a, \"hell of a night,\" due to pain, and is not up for walking in halls. PT to attempt this PM.  "

## 2019-08-04 NOTE — PLAN OF CARE
Was crying in pain at 2130 wanted something more for pain stated pain medications had gotten off schedule and pain is unbearable wanted MD called to get something stronger talked to MD and talked with her and did want the IV medications and to get back on schedule of oral pain medications and give medications  when due.states is having heaviness of both legs and numbness from upper leg to knees has is same as from surgery.

## 2019-08-04 NOTE — PLAN OF CARE
"      VS:   /69 (BP Location: Right arm)   Pulse 86   Temp 98.6  F (37  C) (Oral)   Resp 16   Ht 1.727 m (5' 8\")   Wt 90.2 kg (198 lb 13.7 oz)   SpO2 96%   BMI 30.24 kg/m       Output:   Voiding spontaneously good amounts of urine. No BM this shift, but passing gas   Lungs CTA   Activity:   Up with SBA/independent   Skin: Intact ex incisions.    Pain:   Controlled with current pain regimen. One bump of dilaudid this shift.    Neuro/CMS:   A&Ox3, numbness in upper thighs but otherwise intact   Dressing(s):   CDI   Diet:   Regular, tolerating well   LDA:   L PIV SL   Equipment:   Walker   Plan:   Optimize pain management, continue to work with therapy. WIll continue to monitor and follow plan of care. Able to make needs known and call light within reach.   Additional Info:          "

## 2019-08-04 NOTE — PROGRESS NOTES
[facility], [facility] Progress Note - Hospitalist Service       Date of Admission:  7/29/2019  Assessment & Plan         1. SP L4-L5, L5S1 Fusion, POD#6  -Post operative mild hypotension now resolved. Stop IV fluids   - Perioperative blood loss at 1300 mls. Post op Hgb at 9.1.    Has known bilateral numbness in her thighs. Primary team aware. Numbness getting much better  PCA pump stopped t( 7/31). Transition to oral pain medications. Patient has stopped taking Dilaudid and now on Oxycodone Q 3 hrs with much better pain control   Lyrica 25 mg  TID initiated  On 8/2  as she has failed Gabapentin in the past with encephalopathy associated with Gabapentin  - Remainder of the management as per primary team      2. HTN:    Continue to hold lisinopril-hydrochlorothiazide as patient continues to have relative asymptomatic hypotension. May not need this medication at discharge     3. DM-2:   PTA diet controlled.  She got 8 mg decadron intraoperatively   Given stable blood sugars, Stopped sliding scale insulin and limit blood sugar checks to twice daily  Stable blood sugars     4. Vit D def and Osteoporosis:   On Vit D supplements 76477 units every week    5. R&Y Gastric Bypass:   Avoid NSAIDs. She was on PPI for long time. Recently has been stopped. She is using OTC Zantac . We will schedule it here.     6. Hyponatremia: ( Resolved)   Likely due to hypovolemia, which resolved with use of IV fluids     7. Anxiety and Depression:   Stable   Continue  paxil        Diet: Advance Diet as Tolerated: Regular Diet Adult  Diet    DVT Prophylaxis: Pneumatic Compression Devices  Schuster Catheter: not present  Code Status: Full Code      Disposition Plan   Expected discharge: Per primary team   Entered: Riaz Spear MD 08/04/2019, 8:23 AM       The patient's care was discussed with the Bedside Nurse, , Patient and Primary team.    Riaz Spear MD  Hospitalist  Service  [facility], [place]    ______________________________________________________________________    Interval History   Yun continues to feel better  In that, her numbness is improving   She is more independent and ambulating without assistance   No fever or chills     Data reviewed today: I reviewed all medications, new labs and imaging results over the last 24 hours. I personally reviewed no images or EKG's today.    Physical Exam   Vital Signs: Temp: 98.6  F (37  C) Temp src: Oral BP: 104/69   Heart Rate: 78 Resp: 16 SpO2: 96 % O2 Device: None (Room air)    Weight: 198 lbs 13.68 oz  General Appearance: Awake, alert and not in distress  Respiratory: Normal breath sounds bilaterally   Cardiovascular: Normal heart sounds   GI: Soft, non tender. Normal bowel sounds   Skin: No bruising / bleeding   Other:. Moving both legs with symmetric strength and power     Data   Recent Labs   Lab 07/30/19  0638 07/29/19  2206 07/29/19  2048 07/29/19  1518 07/29/19  1230   WBC 8.1  --   --   --   --    HGB 9.1* 9.8*  --  11.5* 10.8*   MCV 89  --   --   --   --      --   --   --   --      --  134 129* 130*   POTASSIUM 4.2  --   --  4.8 4.3   CHLORIDE 102  --   --   --   --    CO2 28  --   --   --   --    BUN 15  --   --   --   --    CR 0.97  --   --   --   --    ANIONGAP 5  --   --   --   --    MARY JO 7.5*  --   --   --   --    *  --   --  192* 202*

## 2019-08-04 NOTE — PLAN OF CARE
VS: VSS, A&O X4.   O2: Room air sat. Upper 90's.   Output: Adequate amount voiding in bathroom.    Last BM: 08/01/19 passing gas, suppository offered but pt declined.    Activity: Up in the room independent using walker.    Skin: Intact except surgical incision.    Pain: Manageable with discomfort.   CMS: CMS and neuro intact to baseline.    Dressing: CDI.    Diet: Regular tolerating with out N/V.    LDA: None.    Equipment: Walker, personal belongings.    Plan: TBD.    Additional Info:

## 2019-08-04 NOTE — PROGRESS NOTES
"D: pt extremely  Agitated re: pain meds.  Pt is OBSESSING and FIXATED and RUMINATING  Over pain meds/ plan. She is getting any and ALL that EMR allows. But still unhappy.   Pt states\" the current plan isnt ever CLOSE TO what I  Take at home\"  Pt educated that the goal is to decrease and minimize narc usage.    A; pt encouraged to try to decrease  Meds. ORTHOPAEDICALLY: pt is up and moving about room INDEPENDENTLY with walker .  "

## 2019-08-04 NOTE — PROGRESS NOTES
"Orthopaedic Surgery Progress Note:  08/04/2019     Subjective:   Postoperative day 6.    Some pain issues early yesterday evening, improved overnight.  No other acute issues.  Continues to have pain while ambulating.   Ongoing stable thigh numbness (R>L), unchanged.  Denies any other tingling or numbness.  Tolerating a diet.  Bowels and bladder working without issue.  She denies any chest pain, shortness of breath, fevers or chills.    Objective:   /61 (BP Location: Right arm)   Pulse 86   Temp 98.4  F (36.9  C) (Oral)   Resp 16   Ht 1.727 m (5' 8\")   Wt 90.2 kg (198 lb 13.7 oz)   SpO2 96%   BMI 30.24 kg/m    No intake/output data recorded.  Gen: NAD. Resting comfortably in bed  Resp: Breathing comfortably on RA  Drain: Removed 8/2  Musculoskeletal: Dressings anteriorly and posteriorly are clean dry and intact, no evidence of erythema fluctuance or drainage.  The abdomen is soft without evidence of peritoneal signs.  DP/PT 2+ and symmetric. DP/PT pulses dopplered this AM        Lower extremities:  Motor Strength Right Left   Hip flexion: L1, L2, L3 5/5 5/5   Hip adduction: L2, L3 5/5 5/5   Knee flexion: S1 5/5 5/5   Knee extension: L3, L4 4/5 4/5   Ankle dosiflexion: L4, L5 5/5 5/5   EHL: L5 5/5 5/5   Ankle plantarflexion: S1 5/5 5/5     Sensation from L1-S2 is preserved.    Labs:  Lab Results   Component Value Date    WBC 8.1 07/30/2019    HGB 9.1 (L) 07/30/2019     07/30/2019        Assessment & Plan:   Yun Sagastume is a 54 year old female with lumbar spine spondylosis and saggital malalignment, s/p L4-5 and L5-S1 ALIF as well as PLIF during a two stage procedure with Dr Carrion on 7/30/2019.    Postoperatively the patient is struggling with difficulty ambulating, but is showing improvement.  In addition to this she has had numbness over the bilateral anterior thighs, unchanged, no weakness in the bilateral lower extremities.  Physical therapy has recommended TCU at discharge but the " patient does not have insurance benefits to cover for this.      Orthopaedics Primary  Activity: Up with assist until independent. No excessive bending or twisting. No lifting >10 lbs x 6 weeks. No Shelia lift for transfers.   Weight bearing status: WBAT.  Pain management: Transition from IV to PO as tolerated. No NSAIDs   Antibiotics: Ancef for 24 hrs, done.  Diet: Begin with clear fluids and progress diet as tolerated.   DVT prophylaxis: SCDs only. No chemical DVT ppx needed.  Imaging: XR Upright  PTDC -done.  Ultrasound: Daily doppler PT/DP pulses  Labs: Labs PRN  Bracing/Splinting: None.  Dressings: Keep Aquacel c/d/i x 7 days.  Drains: To be removed in August 2  Schuster catheter: Removed POD#1.   Physical Therapy/Occupational Therapy: Eval and treat.  Consults: Hospitalist  Follow-up: Clinic with Dr. Carrion in 6 weeks with repeat x-rays.   Disposition: Pending progress with therapies, pain control on orals, and medical stability.    Orthopaedics surgery staff for this patient is Dr. Carrion.    ------------------------------------------------------------------------------------------    [x] Discontinue PCA  [x] Drains removed.  [x] Post xrays done.  [x] Discharge orders done.  [x] Discharge summary started.      Brett Yin MD  Orthopedic Surgery, PGY-4  Pager: [phone]  6:07 AM  August 4, 2019      FOLLOWUP:    Future Appointments   Date Time Provider Department Center   7/30/2019 11:00 AM Tabby Barakat Pt, PT [facility] [place]   7/30/2019  3:30 PM Tabby Barakat Pt, PT [facility] [place]   7/31/2019  7:30 AM Kimberly Gustafson OT [facility] [place]   9/5/2019 10:30 AM Jeremiah Carrion MD [facility]   10/29/2019 10:30 AM Jeremiah Carrion MD [facility]

## 2019-08-04 NOTE — PROVIDER NOTIFICATION
Patient was having increased pain and wanted MD called and wanted something more for pain discussed with MD and will not order increase in oral pain medications and can not have Toradol due to surgery. But still has the IV dilaudid ordered and can have it but will need IV placed

## 2019-08-05 ENCOUNTER — APPOINTMENT (OUTPATIENT)
Dept: OCCUPATIONAL THERAPY | Facility: CLINIC | Age: 54
End: 2019-08-05
Attending: ORTHOPAEDIC SURGERY
Payer: COMMERCIAL

## 2019-08-05 ENCOUNTER — APPOINTMENT (OUTPATIENT)
Dept: PHYSICAL THERAPY | Facility: CLINIC | Age: 54
End: 2019-08-05
Attending: ORTHOPAEDIC SURGERY
Payer: COMMERCIAL

## 2019-08-05 ENCOUNTER — APPOINTMENT (OUTPATIENT)
Dept: CT IMAGING | Facility: CLINIC | Age: 54
End: 2019-08-05
Attending: ORTHOPAEDIC SURGERY
Payer: COMMERCIAL

## 2019-08-05 LAB
GLUCOSE BLDC GLUCOMTR-MCNC: 108 MG/DL (ref 70–99)
GLUCOSE BLDC GLUCOMTR-MCNC: 193 MG/DL (ref 70–99)
GLUCOSE BLDC GLUCOMTR-MCNC: 193 MG/DL (ref 70–99)

## 2019-08-05 PROCEDURE — 99231 SBSQ HOSP IP/OBS SF/LOW 25: CPT | Performed by: INTERNAL MEDICINE

## 2019-08-05 PROCEDURE — 97116 GAIT TRAINING THERAPY: CPT | Mod: GP

## 2019-08-05 PROCEDURE — 12000001 ZZH R&B MED SURG/OB UMMC

## 2019-08-05 PROCEDURE — 97110 THERAPEUTIC EXERCISES: CPT | Mod: GP

## 2019-08-05 PROCEDURE — 25000132 ZZH RX MED GY IP 250 OP 250 PS 637: Performed by: PHYSICIAN ASSISTANT

## 2019-08-05 PROCEDURE — 97535 SELF CARE MNGMENT TRAINING: CPT | Mod: GO | Performed by: OCCUPATIONAL THERAPIST

## 2019-08-05 PROCEDURE — 00000146 ZZHCL STATISTIC GLUCOSE BY METER IP

## 2019-08-05 PROCEDURE — 25000128 H RX IP 250 OP 636: Performed by: ORTHOPAEDIC SURGERY

## 2019-08-05 PROCEDURE — 99207 ZZC NO CHARGE FOLLOW UP PS: CPT

## 2019-08-05 PROCEDURE — 74174 CTA ABD&PLVS W/CONTRAST: CPT

## 2019-08-05 PROCEDURE — 25000132 ZZH RX MED GY IP 250 OP 250 PS 637: Performed by: NURSE PRACTITIONER

## 2019-08-05 PROCEDURE — 25000128 H RX IP 250 OP 636: Performed by: NURSE PRACTITIONER

## 2019-08-05 PROCEDURE — 25000131 ZZH RX MED GY IP 250 OP 636 PS 637: Performed by: ORTHOPAEDIC SURGERY

## 2019-08-05 PROCEDURE — 25000125 ZZHC RX 250: Performed by: ORTHOPAEDIC SURGERY

## 2019-08-05 RX ORDER — METHYLPREDNISOLONE 4 MG/1
4 TABLET ORAL ONCE
Status: COMPLETED | OUTPATIENT
Start: 2019-08-05 | End: 2019-08-05

## 2019-08-05 RX ORDER — METHYLPREDNISOLONE 8 MG/1
8 TABLET ORAL AT BEDTIME
Status: DISCONTINUED | OUTPATIENT
Start: 2019-08-05 | End: 2019-08-06 | Stop reason: HOSPADM

## 2019-08-05 RX ORDER — IOPAMIDOL 755 MG/ML
100 INJECTION, SOLUTION INTRAVASCULAR ONCE
Status: COMPLETED | OUTPATIENT
Start: 2019-08-05 | End: 2019-08-05

## 2019-08-05 RX ORDER — METHYLPREDNISOLONE 4 MG/1
4 TABLET ORAL AT BEDTIME
Status: DISCONTINUED | OUTPATIENT
Start: 2019-08-07 | End: 2019-08-06 | Stop reason: HOSPADM

## 2019-08-05 RX ORDER — METHYLPREDNISOLONE 4 MG/1
4 TABLET ORAL
Status: DISCONTINUED | OUTPATIENT
Start: 2019-08-06 | End: 2019-08-06 | Stop reason: HOSPADM

## 2019-08-05 RX ORDER — METHYLPREDNISOLONE 8 MG/1
8 TABLET ORAL ONCE
Status: COMPLETED | OUTPATIENT
Start: 2019-08-05 | End: 2019-08-05

## 2019-08-05 RX ADMIN — RANITIDINE 150 MG: 150 TABLET ORAL at 19:30

## 2019-08-05 RX ADMIN — OXYCODONE HYDROCHLORIDE 15 MG: 5 TABLET ORAL at 13:18

## 2019-08-05 RX ADMIN — IOPAMIDOL 98 ML: 755 INJECTION, SOLUTION INTRAVENOUS at 10:35

## 2019-08-05 RX ADMIN — SODIUM CHLORIDE 65 ML: 9 INJECTION, SOLUTION INTRAVENOUS at 10:36

## 2019-08-05 RX ADMIN — OXYCODONE HYDROCHLORIDE 15 MG: 5 TABLET ORAL at 10:09

## 2019-08-05 RX ADMIN — METHYLPREDNISOLONE 8 MG: 8 TABLET ORAL at 14:55

## 2019-08-05 RX ADMIN — OXYCODONE HYDROCHLORIDE 15 MG: 5 TABLET ORAL at 19:30

## 2019-08-05 RX ADMIN — METHYLPREDNISOLONE 8 MG: 8 TABLET ORAL at 22:18

## 2019-08-05 RX ADMIN — PREGABALIN 25 MG: 25 CAPSULE ORAL at 08:29

## 2019-08-05 RX ADMIN — HYDROXYZINE HYDROCHLORIDE 25 MG: 25 TABLET ORAL at 10:09

## 2019-08-05 RX ADMIN — HYDROMORPHONE HYDROCHLORIDE 0.5 MG: 1 INJECTION, SOLUTION INTRAMUSCULAR; INTRAVENOUS; SUBCUTANEOUS at 03:35

## 2019-08-05 RX ADMIN — PREGABALIN 25 MG: 25 CAPSULE ORAL at 19:30

## 2019-08-05 RX ADMIN — OXYCODONE HYDROCHLORIDE 15 MG: 5 TABLET ORAL at 22:18

## 2019-08-05 RX ADMIN — CYCLOBENZAPRINE HYDROCHLORIDE 10 MG: 5 TABLET, FILM COATED ORAL at 20:16

## 2019-08-05 RX ADMIN — OXYCODONE HYDROCHLORIDE 15 MG: 5 TABLET ORAL at 07:09

## 2019-08-05 RX ADMIN — HYDROXYZINE HYDROCHLORIDE 25 MG: 25 TABLET ORAL at 22:18

## 2019-08-05 RX ADMIN — ACETAMINOPHEN 975 MG: 325 TABLET, FILM COATED ORAL at 04:12

## 2019-08-05 RX ADMIN — METHYLPREDNISOLONE 4 MG: 4 TABLET ORAL at 14:56

## 2019-08-05 RX ADMIN — ACETAMINOPHEN 650 MG: 325 TABLET, FILM COATED ORAL at 08:29

## 2019-08-05 RX ADMIN — PREGABALIN 25 MG: 25 CAPSULE ORAL at 14:55

## 2019-08-05 RX ADMIN — HYDROXYZINE HYDROCHLORIDE 25 MG: 25 TABLET ORAL at 16:13

## 2019-08-05 RX ADMIN — ACETAMINOPHEN 975 MG: 325 TABLET, FILM COATED ORAL at 19:30

## 2019-08-05 RX ADMIN — HYDROXYZINE HYDROCHLORIDE 25 MG: 25 TABLET ORAL at 04:12

## 2019-08-05 RX ADMIN — OXYCODONE HYDROCHLORIDE 15 MG: 5 TABLET ORAL at 04:12

## 2019-08-05 RX ADMIN — PAROXETINE HYDROCHLORIDE HEMIHYDRATE 60 MG: 20 TABLET, FILM COATED ORAL at 08:29

## 2019-08-05 RX ADMIN — OXYCODONE HYDROCHLORIDE 15 MG: 5 TABLET ORAL at 16:13

## 2019-08-05 RX ADMIN — CYCLOBENZAPRINE HYDROCHLORIDE 10 MG: 5 TABLET, FILM COATED ORAL at 11:24

## 2019-08-05 RX ADMIN — RANITIDINE 150 MG: 150 TABLET ORAL at 07:10

## 2019-08-05 RX ADMIN — METHYLPREDNISOLONE 4 MG: 4 TABLET ORAL at 14:57

## 2019-08-05 RX ADMIN — OXYCODONE HYDROCHLORIDE 15 MG: 5 TABLET ORAL at 01:19

## 2019-08-05 ASSESSMENT — ACTIVITIES OF DAILY LIVING (ADL)
ADLS_ACUITY_SCORE: 14

## 2019-08-05 NOTE — PROGRESS NOTES
Patient: Yun Sagastume  Date of Service: August 5, 2019 Admission Date: 7/29/2019   7 Days Post-Op     Chief Pain Endorsement: acute post-operative pain    Recommendations were discussed and relayed to Sherice Parker  Plan was reviewed by the Inpatient Pain Service and staff attending, Dr. Pedrito Carr      1. Continue oxycodone 15 mg q3h PRN. If pt returns to OR, recommend to restart IV hydromorphone 0.3-0.5 mg IV q2h PRN breakthrough pain, in addition to same oxycodone dosing.  2. Increase pregabalin to 50 mg PO TID. Okay to defer dose titration to pt's PCP given it is a new medication as of 8/2/19.  3. If primary team okay to start NSAIDs, recommend ibuprofen 600 mg PO q6h PRN.  4. Continue cyclobenzaprine 10 mg PO TID PRN muscle spasms. Consider rotation to alternative muscle relaxant in the future.  5. Continue Lidocaine 4% patches, 1-3 patches transdermal every 24 hours (12 hours on and 12 hours off).  6. Bowel regimen per primary team to prevent opioid induced constipation.    Pain Service will sign off at this time.    Thank you for consulting the Inpatient Pain Management Service. The above recommendations are to be acted upon at the primary team s discretion.     To reach us:  Mon - Friday 8 AM - 3 PM: Pager [phone] (Text Page)  After hours, weekends and holidays: Primary service should call [phone] for the on-call pain specialist    PAIN MEDICATION SAFE USE:   Prior to discharge instruct patient on the following in addition to the medication fact sheet:    Caution: these medications can cause sedation    Take prescription medicine only if it has been prescribed by your doctor    Do not take more medicine or take it more often than instructed     Call your doctor if pain gets worse    Never mix pain medicine with alcohol, sleeping pills, or any illicit drugs    Do not operate heavy machinery, including vehicles, when initiation opioid therapy or increasing dosage    Store prescription  opioids in a locked container, whenever possible     Dispose of unused opioids appropriately     Do not stop abruptly once at higher doses.  These medications must be tapered off.    Opioid pain medications do carry the risk for physical dependence and addiction and patients should be counseled about this.         1. Acute post-operative pain s/p L4-L5 and L5-S1 anterior lumbar interbody fusion and stage 2 posterior lumbar interbody fusion per Dr Carrion and Dr Grant on 7/29. POD #7 today 8/5. Pt appeared to be resting comfortably in bed. Her mood was dejected due to need for repeat CT imaging given Ortho surgery team's c/f shifting of spinal cage that may require operative correction. Pt expressed frustration with this setback. Overall she reports adequate pain control with oxycodone, however there are instances of breakthrough pain that she feels requires IV Dilaudid, which was discontinued this morning per primary team. We discussed the possibility of return to OR and whether IV Dilaudid would be made available again. Pt was reasonable in expressing this concern and verbalized her understanding that she would not be able to continue IV pain meds at discharge. Today's note from Ortho team indicate NSAIDs may be reasonable to start, but will hold off at this time given possible return to OR.  2. Bilateral thigh numbness. Pt reports this is new since surgery, previous documentation indicate primary team is aware. New start pregabalin 50 mg PO TID initiated on 8/2. Pt reportedly has failed gabapentin in the past d/t encephalopathy. Today 8/5 pt denies mental status impairment, so it would be reasonable to titrate up this dosing to better target the tingling pains associated with her bilateral thigh numbness.  3. H/o RnYGB. Pt reported occasional poor efficacy with MS Contin when she was taking it prior to this surgery, which is suggestive of impaired absorption of extended release medications. Pt does not wish to  restart MS Contin.  4. H/o muscle cramps and Charley horses. Pt reports taking Flexeril 10 mg TID PRN prior to surgery and has done so 'for years.' She isn't sure that it has good benefit for her, as she reports some symptoms despite medication on board. No changes to this aspect of her regimen at this time, given possible return to OR. She may be able to rotate to another muscle relaxant, such as methocarbamol.  5. H/o depression. PTA paroxetine 60 mg once daily resumed.    -- Outpatient opioid requirements prior to admission:  -oxycodone 10 mg tabs 4x/day  -MS Contin 15 mg BID - weaned off leading up to surgery, do not plan to resume    Primary Care Provider: Mary Ellen Salgado  Chronic Pain Provider: [facility]    Interval History:  Yun Sagastume was seen today (August 5, 2019), see Assessment section above for details.     Her last bowel movement was 8/4.  CAPA (Clinically Aligned Pain Assessment):    Comfort (How is your pain?): Tolerable with discomfort  Change in Pain (Since your last medication/intervention?): About the same  Pain Control (How are your pain treatments working?):  Partially effective control  Functioning (Are you able to do activities to get better?) : Can do most things, but pain gets in the way of some   Sleep (Does your pain management allow you to sleep or rest?): Awake with occasional pain      FUNCTIONAL STATUS:  Change:      staying the same  Oral intake:     Regular  Activity level:     Ambulating in anne  Mood:      adjusting to situation     -- Inpatient Medications Related to Pain Management:   Medications related to Pain Management (From now, onward)    Start     Dose/Rate Route Frequency Ordered Stop    08/02/19 1238  oxyCODONE (ROXICODONE) tablet 15 mg      15 mg Oral EVERY 3 HOURS PRN 08/02/19 1238      08/02/19 0930  pregabalin (LYRICA) capsule 25 mg      25 mg Oral 3 TIMES DAILY 08/02/19 0915      08/02/19 0000  pregabalin (LYRICA) 25 MG capsule      25 mg Oral 3  TIMES DAILY 08/02/19 1921 10/01/19 2359    08/01/19 0000  acetaminophen (TYLENOL) tablet 650 mg      650 mg Oral EVERY 4 HOURS PRN 07/29/19 1746      08/01/19 0000  HYDROmorphone (DILAUDID) 4 MG tablet      4-6 mg Oral EVERY 6 HOURS PRN 08/01/19 1525      08/01/19 0000  polyethylene glycol (MIRALAX/GLYCOLAX) packet      17 g Oral 2 TIMES DAILY 08/01/19 1525      08/01/19 0000  senna-docusate (SENOKOT-S/PERICOLACE) 8.6-50 MG tablet      1 tablet Oral 2 TIMES DAILY 08/01/19 1525      07/31/19 1800  acetaminophen (TYLENOL) tablet 975 mg      975 mg Oral EVERY 8 HOURS 07/31/19 1013      07/31/19 1015  Lidocaine (LIDOCARE) 4 % Patch 1-3 patch      1-3 patch  over 12 Hours Transdermal EVERY 24 HOURS 0800 07/31/19 1013      07/31/19 1015  lidocaine patch in PLACE       Transdermal EVERY 8 HOURS 07/31/19 1013      07/30/19 2000  polyethylene glycol (MIRALAX/GLYCOLAX) Packet 17 g      17 g Oral 2 TIMES DAILY 07/30/19 1407      07/29/19 2000  senna-docusate (SENOKOT-S/PERICOLACE) 8.6-50 MG per tablet 1 tablet      1 tablet Oral 2 TIMES DAILY 07/29/19 1845      07/29/19 2000  senna-docusate (SENOKOT-S/PERICOLACE) 8.6-50 MG per tablet 2 tablet      2 tablet Oral 2 TIMES DAILY 07/29/19 1845      07/29/19 2000  docusate sodium (COLACE) capsule 200 mg      200 mg Oral 2 TIMES DAILY 07/29/19 1845      07/29/19 1845  lidocaine 1 % 0.1-1 mL      0.1-1 mL Other EVERY 1 HOUR PRN 07/29/19 1845      07/29/19 1845  lidocaine (LMX4) cream       Topical EVERY 1 HOUR PRN 07/29/19 1845      07/29/19 1845  bisacodyl (DULCOLAX) Suppository 10 mg      10 mg Rectal DAILY PRN 07/29/19 1845      07/29/19 1746  cyclobenzaprine (FLEXERIL) tablet 10 mg      10 mg Oral 3 TIMES DAILY PRN 07/29/19 1746      07/29/19 1746  hydrOXYzine (ATARAX) tablet 25 mg      25 mg Oral EVERY 6 HOURS PRN 07/29/19 1746            LAB DATA:  Recent Labs   Lab 07/30/19  0638 07/29/19 2206 07/29/19  1518   CR 0.97  --   --    WBC 8.1  --   --    HGB 9.1* 9.8* 11.5*          ----------------------------------------------------------------------------------  Fred Cesar, [facility]  Inpatient Pain Service     To reach us:  Mon - Friday 8 AM - 3 PM: Pager [phone] (Text Page)  After hours, weekends and holidays: Primary service should call [phone] for the on-call pain specialist    Helpful Resources:  Getting Rid of Unwanted Medications (printable PDF for patients)   Opioid Overdose Prevention Toolkit (printable PDF for patients)   Prescription Opioids: What You Need To Know (printable PDF for patients)

## 2019-08-05 NOTE — PLAN OF CARE
"Discharge Planner PT   Patient plan for discharge: Home  Current status: Moving slow but well. Independent with bed mobility using log roll technique and Tameka for transfers with use of walker. Ambulates in anne Tameka with walker, slow but steady. Declines stair trial as states her stairs are \"much shorter\" and if needed her son will \"build a ramp and push me up\". Ended in chair needs in reach. Decreased POC to daily as patient mobilizing well. If remains in hospital will see for gait progression (possibly without walker) and strengthening.  Barriers to return to prior living situation: No PT barriers  Recommendations for discharge: Home with PRN assist and home PT/OT  Rationale for recommendations: For safety evaluation and progression       Entered by: Elizabeth Crouch 08/05/2019 1:34 PM       "

## 2019-08-05 NOTE — PROGRESS NOTES
54/f now 1 wk s/p anterior interbody fusion L4-5 and L5-S1; TLIF L3-4; and posterior instrumented spinal fusion L3-pelvis.  Standing x-rays 4 days ago showed some anterior displacement of the L4-5 cage.    Lumbar CT 3 days ago showed that there has been anterior displacement of L4-5 cage ~3-4 mm compared to intraoperative images.  Fortunately patient has remained asymptomatic in terms of possible vascular compromise.  She has good perfusion in both lower extremities and with good pulses dorsalis pedis and tibialis anterior bilaterally.  She also has warm extremities.    Her main complaint is bilateral anterior thigh deep soreness or pain.  She uses the term 'numbness' but on further questioning it really is not numbness in the sense of a sensory deficit but more of a deep ache.  This was not present preoperatively.  She currently is taking oxycodone, Dilaudid and Flexeril which are not helping.  She currently is on Lyrica for the past 5 days or so.  She continues to have the anterior thigh symptoms.    I discussed with my spine surgeon partners this morning, and there was some concern that if the L4-5 cage would need to be revised in the future, that this would be extremely difficult once scarring has taken place.  In that sense it would be a lot better to revise this now while the tissue planes are still open and revision will not be very dangerous.    We obtained abdomen/pelvis CT angiogram.  This does not show any significant arterial compromise.  The venous system cannot be delineated as well.     On exam today patient has significant tenderness to deep palpation of her anterior thighs bilaterally.  Left thigh is slightly larger in circumference compared to right.  There is no redness.  She has no significant pain below the knees.  She is neurologically intact.    I tried contacting our vascular surgeon Dr. Grant, who was involved in her case last week.  Unfortunately Dr. Grant is out this entire week.  Thus  revision may not be able to be performed this week.    I discussed all of the above with patient.  We may still plan on revising this perhaps within the next 2 to 3 weeks.  However it would be best to have a vascular surgeon available.  As such we will likely plan on discharging her and then readmitting her for revision surgery.  Patient may discharge any time once cleared by PT.  In the meantime we will also start her on a Medrol Dosepak.

## 2019-08-05 NOTE — PROGRESS NOTES
[facility], [facility] Progress Note - Hospitalist Service       Date of Admission:  7/29/2019  Assessment & Plan         1. SP L4-L5, L5S1 Fusion, POD#7  -Post operative mild hypotension now resolved. Stop IV fluids   - Perioperative blood loss at 1300 mls. Post op Hgb at 9.1.    Has known bilateral numbness in her thighs. Primary team aware. Numbness getting much better  PCA pump stopped t( 7/31). Transition to oral pain medications. Patient has stopped taking Dilaudid and now on Oxycodone Q 3 hrs with much better pain control   Lyrica 25 mg  TID initiated  On 8/2  as she has failed Gabapentin in the past with encephalopathy associated with Gabapentin   Per Primary team, concerns about a slight shift in the orientation of the spine and metal.They plan to pursue with a CT abdomen with IV contrast to determine if the positioning need to be corrected operatively      2. HTN:    Continue to hold lisinopril-hydrochlorothiazide ( 10/12.5) as patient continues to have relative asymptomatic hypotension. May not need this medication at discharge     3. DM-2:   PTA diet controlled.    Given stable blood sugars, Stopped sliding scale insulin and limit blood sugar checks to twice daily  Stable blood sugars     4. Vit D def and Osteoporosis:   On Vit D supplements 80220 units every week    5. R&Y Gastric Bypass:   Avoid NSAIDs. She was on PPI for long time. Recently has been stopped. She is using OTC Zantac . We will schedule it here.     6. Hyponatremia: ( Resolved)   Likely due to hypovolemia, which resolved with use of IV fluids     7. Anxiety and Depression:   Stable   Continue  paxil        Diet: Advance Diet as Tolerated: Regular Diet Adult  Diet    DVT Prophylaxis: Pneumatic Compression Devices  Schuster Catheter: not present  Code Status: Full Code      Disposition Plan   Expected discharge: Per primary team   Entered: Riaz Spear MD 08/05/2019, 10:01 AM       The  patient's care was discussed with the Bedside Nurse, , Patient and Primary team.    Riaz Spear MD  Hospitalist Service  [facility], [place]    ______________________________________________________________________    Interval History   Yun feels better  No new events   Discussed plans for CT abdomen today  Eating and drinking well     Data reviewed today: I reviewed all medications, new labs and imaging results over the last 24 hours. I personally reviewed no images or EKG's today.    Physical Exam   Vital Signs: Temp: 98.3  F (36.8  C) Temp src: Oral BP: 104/61 Pulse: 85 Heart Rate: 73 Resp: 14 SpO2: 95 % O2 Device: None (Room air)    Weight: 198 lbs 13.68 oz  General Appearance: Awake, alert and not in distress  Respiratory: Normal breath sounds bilaterally   Cardiovascular: Normal heart sounds   GI: Soft, non tender. Normal bowel sounds   Skin: No bruising / bleeding   Other:. Moving both legs with symmetric strength and power     Data   Recent Labs   Lab 07/30/19  0638 07/29/19  2206 07/29/19  2048 07/29/19  1518 07/29/19  1230   WBC 8.1  --   --   --   --    HGB 9.1* 9.8*  --  11.5* 10.8*   MCV 89  --   --   --   --      --   --   --   --      --  134 129* 130*   POTASSIUM 4.2  --   --  4.8 4.3   CHLORIDE 102  --   --   --   --    CO2 28  --   --   --   --    BUN 15  --   --   --   --    CR 0.97  --   --   --   --    ANIONGAP 5  --   --   --   --    MARY JO 7.5*  --   --   --   --    *  --   --  192* 202*

## 2019-08-05 NOTE — PLAN OF CARE
VS:   VSS. LS clear. Denies CP/SOB   Output:   Voiding spontaneously w/out difficulty. LBM 8/4 per pt report. Pt declined bowel meds this am   Activity:   Ind in room w/walker   Skin:    Pain:   Pt reports pain in back and radiating down both legs, improved w/admin of oxy 15 mg q 3hrs as well as atarax, robaxin, gabapentin and tylenol. Pt declined icy/hot, lidocaine patches and ice packs   Neuro/CMS:   A&Ox4. Pt has numbness in bilat thighs since surgery. MD's aware. Pt also has swelling in L knee. Admin of methlyprednisone today to reduce swelling and relieve pain. Left leg elevated on pillows. Pulses and cap refill +   Dressing(s):   Aquacel on back c/d/I. Incision on L lower abdomen JAMEL, approximated w/no drainage   Diet:   Tolerating reg diet no n/v   LDA:   PIV's x2 in LUE    Equipment:      Plan:   Pt will discharge to home (likely tomorrow)   Additional Info:   Pt had CT angio today to further assess cage displacement seen in standing x-rays. Though the pt does have swelling in L thigh and bilat numbness it is not believed at this time that there is significant vascular compromise. Will be reassessed in 2-3 weeks- see 8/5 note from Murali

## 2019-08-05 NOTE — PLAN OF CARE
VS: Stable.   O2: Sats >90%, on RA.   Output: Voiding adequate amount.   Last BM: LBM 8/4 and passing gas.   Activity: Up independently in room. WBAT.   Skin: Intact except lower back and left hip incision.   Pain: Pain managed with prn oxycodone, IV dilaudid, vistaril and flexeril.    CMS: Has numbness in bilateral thigh.   Dressing: Lower back dressing CDI. Left hip open to air.   Diet: Tolerating regular diet.   LDA: PIV SL into left forearm.    Equipment: Walker.   Plan: TBD.   Additional Info:

## 2019-08-05 NOTE — PROGRESS NOTES
"Orthopaedic Surgery Progress Note:  08/05/2019     Subjective:   Postoperative day 7. No acute issues overnight. Pain continues to be an issue at this stage, discussed with the patient that we will transition to oral pain medications, also discussed the side effects of narcotics. Patient in agreement with this plan. The patient denies any chest pain, shortness of breath, fevers, chills, new numbness or tingling down the extremities.      Objective:   /61 (BP Location: Right arm)   Pulse 85   Temp 98.3  F (36.8  C) (Oral)   Resp 14   Ht 1.727 m (5' 8\")   Wt 90.2 kg (198 lb 13.7 oz)   SpO2 95%   BMI 30.24 kg/m    No intake/output data recorded.  Gen: NAD. Resting comfortably in bed  Resp: Breathing comfortably on RA  Drain: Removed 8/2  Musculoskeletal: dressing on the back is c/d/i. Anterior incision is benign. No evidence of erythema fluctuance or drainage.  The abdomen is soft without evidence of peritoneal signs.  DP/PT 2+ and symmetric.     Lower extremities:  Motor Strength Right Left   Hip flexion: L1, L2, L3 5/5 5/5   Hip adduction: L2, L3 5/5 5/5   Knee flexion: S1 5/5 5/5   Knee extension: L3, L4 4/5 4/5   Ankle dosiflexion: L4, L5 5/5 5/5   EHL: L5 5/5 5/5   Ankle plantarflexion: S1 5/5 5/5     Sensation from L1-S2 is preserved.    Labs:  Lab Results   Component Value Date    WBC 8.1 07/30/2019    HGB 9.1 (L) 07/30/2019     07/30/2019        Assessment & Plan:   Yun Sagastume is a 54 year old female with lumbar spine spondylosis and saggital malalignment, s/p L4-5 and L5-S1 ALIF as well as PLIF during a two stage procedure with Dr Carrion on 7/30/2019.    Postoperatively the patient is struggling with difficulty ambulating, but is showing improvement.  In addition to this she has had numbness over the bilateral anterior thighs, unchanged, no weakness in the bilateral lower extremities.  Physical therapy has recommended TCU at discharge but the patient does not have insurance benefits " to cover for this.      Orthopaedics Primary  Activity: Up with assist until independent. No excessive bending or twisting. No lifting >10 lbs x 6 weeks. No Shelia lift for transfers.   Weight bearing status: WBAT.  Pain management: PO as tolerated. Ok for NSAIDs   Antibiotics: Ancef for 24 hrs, done.  Diet: Begin with clear fluids and progress diet as tolerated.   DVT prophylaxis: SCDs only. No chemical DVT ppx needed.  Imaging: XR Upright  PTDC -done.  Labs: Labs PRN  Bracing/Splinting: None.  Dressings: Keep Aquacel c/d/i x 7 days.  Drains: removed in August 2  Schuster catheter: Removed POD#1.   Physical Therapy/Occupational Therapy: Eval and treat.  Consults: Hospitalist  Follow-up: Clinic with Dr. Carrion in 6 weeks with repeat x-rays.   Disposition: ok for discharge from an orthopaedic standpoint to her home with aid and outpatient PT, patient is fully ambulatory at this time.    Orthopaedics surgery staff for this patient is Dr. Carrion.    ------------------------------------------------------------------------------------------    [x] Discontinue PCA  [x] Drains removed.  [x] Post xrays done.  [x] Discharge orders done.  [x] Discharge summary started.      Jose Jones MD  Orthopaedic Surgery, PGY-4  Pager: [phone]

## 2019-08-05 NOTE — PLAN OF CARE
PT10A: cancel- pt OOR for CT at time of session. PT re-rounded to check back and pt returned but declining therapy this am as she is anxiously awaiting CT-results. Per pt, may need to return to OR to correct possible translation of cage, but known when this may be.   advised to use Readers after SX when Needed.

## 2019-08-05 NOTE — PLAN OF CARE
"      VS:   /61 (BP Location: Right arm)   Pulse 85   Temp 98.3  F (36.8  C) (Oral)   Resp 14   Ht 1.727 m (5' 8\")   Wt 90.2 kg (198 lb 13.7 oz)   SpO2 95%   BMI 30.24 kg/m       Output:   Using bathroom independently, BM 8/4   Activity:   Independent with walker    Skin: incisions   Pain:   Pt complains of severe pain, all meds given when available     Neuro/CMS:   A&Ox4, bilat thigh numbness   Dressing(s):   CDI   Diet:   regular   LDA:   PIV SL   Equipment:   Walker    Plan:   Discharge to home   Additional Info:   Able to make needs known        "

## 2019-08-05 NOTE — PROGRESS NOTES
CLINICAL NUTRITION SERVICES    Reviewed nutrition risk factors due to LOS. Pt is tolerating diet, eating well per nursing documentation. No nutrition issues identified at this time. RD will assess at LOS day 14, unless consulted.      Lilly Higgins RD, LD  Unit Pager: [phone]

## 2019-08-05 NOTE — PLAN OF CARE
Discharge Planner OT   Patient plan for discharge: Not discussed  Current status: SBA  Barriers to return to prior living situation: Pain  Recommendations for discharge: Home with assist.  Rationale for recommendations: Pt will benefit from continued OT to maximize level of independence.        Entered by: Stephania Scott 08/05/2019 6:35 PM

## 2019-08-06 ENCOUNTER — PATIENT OUTREACH (OUTPATIENT)
Dept: CARE COORDINATION | Facility: CLINIC | Age: 54
End: 2019-08-06

## 2019-08-06 VITALS
DIASTOLIC BLOOD PRESSURE: 76 MMHG | HEART RATE: 73 BPM | BODY MASS INDEX: 30.14 KG/M2 | TEMPERATURE: 96.8 F | WEIGHT: 198.85 LBS | OXYGEN SATURATION: 96 % | HEIGHT: 68 IN | SYSTOLIC BLOOD PRESSURE: 112 MMHG | RESPIRATION RATE: 16 BRPM

## 2019-08-06 DIAGNOSIS — T84.216A HARDWARE FAILURE OF ANTERIOR COLUMN OF SPINE (H): Primary | ICD-10-CM

## 2019-08-06 LAB
ANION GAP SERPL CALCULATED.3IONS-SCNC: 6 MMOL/L (ref 3–14)
BUN SERPL-MCNC: 9 MG/DL (ref 7–30)
CALCIUM SERPL-MCNC: 8.8 MG/DL (ref 8.5–10.1)
CHLORIDE SERPL-SCNC: 106 MMOL/L (ref 94–109)
CO2 SERPL-SCNC: 27 MMOL/L (ref 20–32)
CREAT SERPL-MCNC: 0.72 MG/DL (ref 0.52–1.04)
GFR SERPL CREATININE-BSD FRML MDRD: >90 ML/MIN/{1.73_M2}
GLUCOSE BLDC GLUCOMTR-MCNC: 138 MG/DL (ref 70–99)
GLUCOSE SERPL-MCNC: 141 MG/DL (ref 70–99)
HGB BLD-MCNC: 8.6 G/DL (ref 11.7–15.7)
POTASSIUM SERPL-SCNC: 4.3 MMOL/L (ref 3.4–5.3)
SODIUM SERPL-SCNC: 139 MMOL/L (ref 133–144)

## 2019-08-06 PROCEDURE — 25000131 ZZH RX MED GY IP 250 OP 636 PS 637: Performed by: ORTHOPAEDIC SURGERY

## 2019-08-06 PROCEDURE — 85018 HEMOGLOBIN: CPT | Performed by: STUDENT IN AN ORGANIZED HEALTH CARE EDUCATION/TRAINING PROGRAM

## 2019-08-06 PROCEDURE — 99207 ZZC NO CHARGE SIGN-OFF PS: CPT

## 2019-08-06 PROCEDURE — 36415 COLL VENOUS BLD VENIPUNCTURE: CPT | Performed by: STUDENT IN AN ORGANIZED HEALTH CARE EDUCATION/TRAINING PROGRAM

## 2019-08-06 PROCEDURE — 99232 SBSQ HOSP IP/OBS MODERATE 35: CPT | Performed by: INTERNAL MEDICINE

## 2019-08-06 PROCEDURE — 80048 BASIC METABOLIC PNL TOTAL CA: CPT | Performed by: STUDENT IN AN ORGANIZED HEALTH CARE EDUCATION/TRAINING PROGRAM

## 2019-08-06 PROCEDURE — 25000132 ZZH RX MED GY IP 250 OP 250 PS 637: Performed by: NURSE PRACTITIONER

## 2019-08-06 PROCEDURE — 00000146 ZZHCL STATISTIC GLUCOSE BY METER IP

## 2019-08-06 PROCEDURE — 25000132 ZZH RX MED GY IP 250 OP 250 PS 637: Performed by: PHYSICIAN ASSISTANT

## 2019-08-06 RX ORDER — OXYCODONE HYDROCHLORIDE 10 MG/1
10-15 TABLET ORAL
Qty: 84 TABLET | Refills: 0 | Status: SHIPPED | OUTPATIENT
Start: 2019-08-06 | End: 2019-08-06

## 2019-08-06 RX ORDER — OXYCODONE HYDROCHLORIDE 5 MG/1
5 TABLET ORAL EVERY 6 HOURS PRN
Qty: 12 TABLET | Refills: 0 | Status: SHIPPED | OUTPATIENT
Start: 2019-08-06 | End: 2019-08-06

## 2019-08-06 RX ORDER — PREGABALIN 25 MG/1
25 CAPSULE ORAL 3 TIMES DAILY
Qty: 180 CAPSULE | Refills: 0 | Status: SHIPPED | OUTPATIENT
Start: 2019-08-06 | End: 2019-09-09

## 2019-08-06 RX ORDER — METHYLPREDNISOLONE 4 MG
TABLET, DOSE PACK ORAL
Qty: 21 TABLET | Refills: 0 | Status: ON HOLD | OUTPATIENT
Start: 2019-08-06 | End: 2019-08-25

## 2019-08-06 RX ORDER — OXYCODONE HYDROCHLORIDE 10 MG/1
10-15 TABLET ORAL
Qty: 84 TABLET | Refills: 0 | Status: ON HOLD | OUTPATIENT
Start: 2019-08-06 | End: 2019-08-26

## 2019-08-06 RX ORDER — OXYCODONE HYDROCHLORIDE 10 MG/1
5-10 TABLET ORAL EVERY 4 HOURS PRN
Qty: 70 TABLET | Refills: 0 | Status: SHIPPED | OUTPATIENT
Start: 2019-08-13 | End: 2019-08-19

## 2019-08-06 RX ADMIN — PREGABALIN 25 MG: 25 CAPSULE ORAL at 08:17

## 2019-08-06 RX ADMIN — OXYCODONE HYDROCHLORIDE 15 MG: 5 TABLET ORAL at 04:21

## 2019-08-06 RX ADMIN — OXYCODONE HYDROCHLORIDE 15 MG: 5 TABLET ORAL at 10:16

## 2019-08-06 RX ADMIN — METHYLPREDNISOLONE 4 MG: 4 TABLET ORAL at 07:18

## 2019-08-06 RX ADMIN — ACETAMINOPHEN 975 MG: 325 TABLET, FILM COATED ORAL at 04:21

## 2019-08-06 RX ADMIN — METHYLPREDNISOLONE 4 MG: 4 TABLET ORAL at 12:40

## 2019-08-06 RX ADMIN — RANITIDINE 150 MG: 150 TABLET ORAL at 07:18

## 2019-08-06 RX ADMIN — OXYCODONE HYDROCHLORIDE 15 MG: 5 TABLET ORAL at 07:18

## 2019-08-06 RX ADMIN — DOCUSATE SODIUM 200 MG: 100 CAPSULE, LIQUID FILLED ORAL at 08:18

## 2019-08-06 RX ADMIN — OXYCODONE HYDROCHLORIDE 15 MG: 5 TABLET ORAL at 01:13

## 2019-08-06 RX ADMIN — ACETAMINOPHEN 975 MG: 325 TABLET, FILM COATED ORAL at 12:40

## 2019-08-06 RX ADMIN — PAROXETINE HYDROCHLORIDE HEMIHYDRATE 60 MG: 20 TABLET, FILM COATED ORAL at 08:17

## 2019-08-06 ASSESSMENT — ACTIVITIES OF DAILY LIVING (ADL)
ADLS_ACUITY_SCORE: 14

## 2019-08-06 NOTE — PLAN OF CARE
Pt independent in the room with walker. Pain tolerable with Q3 15mg oxy and PRN vistaral and flexeril. Voiding spontaneously without difficulty. LBM 8/5/19. Lungs clear. Revision surgery is needed, but cannot be scheduled for 2-3 weeks for need of vascular surgeon. Numbness and tingling in bilateral thighs which pt stated there has been slight improvement.   Pt able to make needs known and call light within reach. Plan to discharge to home with help of family.

## 2019-08-06 NOTE — PROGRESS NOTES
"Orthopaedic Surgery Progress Note:  08/06/2019     Subjective:   Postoperative day 8. No acute issues oven right. Patient seen by Dr Carrion yesterday, they discussed that she will need revision surgery of her anterior fusion, secondary to displacement of the interbody cage. This surgery will require a vascular surgeon to access the anterior spine, not currently available until next week. Patient will discharge and return for surgery in 2-3 weeks. She expressed understanding. The patient denies any chest pain, shortness of breath, fevers, chills.    Objective:   /66 (BP Location: Right arm)   Pulse 76   Temp 96.6  F (35.9  C) (Oral)   Resp 15   Ht 1.727 m (5' 8\")   Wt 90.2 kg (198 lb 13.7 oz)   SpO2 93%   BMI 30.24 kg/m    No intake/output data recorded.  Gen: NAD. Resting comfortably in bed  Resp: Breathing comfortably on RA  Drain: Removed 8/2  Musculoskeletal: dressing on the back is c/d/i. Anterior incision is benign. No evidence of erythema fluctuance or drainage.  The abdomen is soft without evidence of peritoneal signs.  DP/PT 2+ and symmetric.     Lower extremities:  Motor Strength Right Left   Hip flexion: L1, L2, L3 5/5 5/5   Hip adduction: L2, L3 5/5 5/5   Knee flexion: S1 5/5 5/5   Knee extension: L3, L4 4/5 4/5   Ankle dosiflexion: L4, L5 5/5 5/5   EHL: L5 5/5 5/5   Ankle plantarflexion: S1 5/5 5/5     Sensation from L1-S2 is preserved.    Labs:  Lab Results   Component Value Date    WBC 8.1 07/30/2019    HGB 9.1 (L) 07/30/2019     07/30/2019        Assessment & Plan:   Yun Sagastume is a 54 year old female with lumbar spine spondylosis and saggital malalignment, s/p L4-5 and L5-S1 ALIF as well as PLIF during a two stage procedure with Dr Carrion on 7/30/2019.    Postoperatively the patient is struggling with difficulty ambulating, but is showing improvement.  In addition to this she has had numbness over the bilateral anterior thighs, unchanged, no weakness in the bilateral " lower extremities.  Physical therapy has recommended TCU at discharge but the patient does not have insurance benefits to cover for this, will discharge home with home care services.    Post operative CT showed evidence of interbody cage displacement anteriorly, this will require revision surgery. This will not happen during this admission, currently patient is stable from a vascular standpoint. Can discharge and will be readmitted in 2-3 weeks for this procedure.      Orthopaedics Primary  Activity: Up with assist until independent. No excessive bending or twisting. No lifting >10 lbs x 6 weeks. No Shelia lift for transfers.   Weight bearing status: WBAT.  Pain management: PO as tolerated. Ok for NSAIDs   Antibiotics: Ancef for 24 hrs, done.  Diet: Begin with clear fluids and progress diet as tolerated.   DVT prophylaxis: SCDs only. No chemical DVT ppx needed.  Imaging: XR Upright  PTDC -done.  Labs: Labs PRN  Bracing/Splinting: None.  Dressings: Keep Aquacel c/d/i x 7 days.  Drains: removed in August 2  Schuster catheter: Removed POD#1.   Physical Therapy/Occupational Therapy: Eval and treat.  Consults: Hospitalist  Follow-up: Clinic with Dr. Carrion in 6 weeks with repeat x-rays.   Disposition: ok for discharge from an orthopaedic standpoint to her home with aid and outpatient PT, patient is fully ambulatory at this time.    Orthopaedics surgery staff for this patient is Dr. Carrion.    ------------------------------------------------------------------------------------------    [x] Discontinue PCA  [x] Drains removed.  [x] Post xrays done.  [x] Discharge orders done.  [x] Discharge summary started.      Jose Jones MD  Orthopaedic Surgery, PGY-4  Pager: [phone]

## 2019-08-06 NOTE — PLAN OF CARE
OT:  Notified patient would be leaving prior to therapy session for home; checked in with patient and patient does not have equipment needs for discharge home (has reacher and 2 walkers).  Declines any other needs or equipment prior to discharge home.  Will have assist at home.    Occupational Therapy Discharge Summary    Reason for therapy discharge:    Discharged to home.    Progress towards therapy goal(s). See goals on Care Plan in [facility] electronic health record for goal details.  Goals met    Therapy recommendation(s):    No further therapy is recommended.

## 2019-08-06 NOTE — PLAN OF CARE
PT-10A: patient in process of discharge when PT arrived for session; no intervention provided this date.      Physical Therapy Discharge Summary    Reason for therapy discharge:    Discharged to home with home therapy.    Progress towards therapy goal(s). See goals on Care Plan in [facility] electronic health record for goal details.  Goals met    Therapy recommendation(s):    Continued therapy is recommended.  Rationale/Recommendations:  will benefit from continued skilled PT intervention at home to progress strength & activity tolerance.

## 2019-08-06 NOTE — PROGRESS NOTES
"[facility], [place]   Internal Medicine Daily Note           Interval History/Events     Overnight events reviewed  Reports doing well  No chest pain, shortness of breath  No fever, chills  Reports she is about to be discharge today  No other complains reported.        Review of Systems        4 point ROS including Respiratory, CV, GI and , other than that noted above is negative      Medications   I have reviewed current medications  in the \"current medication\" section of Epic.  Relevant changes include:     Physical Exam   General:       Vital signs:    Blood pressure 112/76, pulse 73, temperature 96.8  F (36  C), temperature source Oral, resp. rate 16, height 1.727 m (5' 8\"), weight 90.2 kg (198 lb 13.7 oz), SpO2 96 %.  Estimated body mass index is 30.24 kg/m  as calculated from the following:    Height as of this encounter: 1.727 m (5' 8\").    Weight as of this encounter: 90.2 kg (198 lb 13.7 oz).    No intake or output data in the 24 hours ending 08/06/19 1423   HEENT: No icterus, no pallor  Cardiovascular: S1, S2 normal.   Respiratory:  B/L CTA  GI/Abdomen: Soft, NT, BS+  Neurology: Alert, awake,and oriented. No tremors.   Extremities: No pretibial edema  Skin:      Laboratory and Imaging Studies     I have reviewed  laboratory and imaging studies in the Epic. Pertinent findings are as below:    BMP  Recent Labs   Lab 08/06/19  0849      POTASSIUM 4.3   CHLORIDE 106   MARY JO 8.8   CO2 27   BUN 9   CR 0.72   *     CBC  Recent Labs   Lab 08/06/19  0849   HGB 8.6*     INRNo lab results found in last 7 days.  LFTsNo lab results found in last 7 days.   PANCNo lab results found in last 7 days.        Impression/Plan          # SP L4-L5, L5S1 Fusion, POD#7  -Post operative mild hypotension now resolved. Stop IV fluids   - Perioperative blood loss at 1300 mls. Post op Hgb at 9.1.    Has known bilateral numbness in her thighs. Primary team aware. Numbness getting much " better  PCA pump stopped t( 7/31). Transition to oral pain medications. Patient has stopped taking Dilaudid and now on Oxycodone Q 3 hrs with much better pain control   Lyrica 25 mg  TID initiated  On 8/2  as she has failed Gabapentin in the past with encephalopathy associated with Gabapentin   Per Primary team, concerns about a slight shift in the orientation of the spine and metal.They plan to pursue with a CT abdomen with IV contrast to determine if the positioning need to be corrected operatively      #  HTN: PTA on lisinopril-hydrochlorothiazide ( 10/12.5). It was held due to soft BP.  BP has been well controlled  - Can continue Lisinopril- hydrochlorothiazide on discharge  - Advised to follow up with primary care     # DM-2: PTA diet controlled.    Given stable blood sugars, Stopped sliding scale insulin and limit blood sugar checks to twice daily  Stable blood sugars      # Vit D def and Osteoporosis:   On Vit D supplements 88691 units every week     #  R&Y Gastric Bypass:   Avoid NSAIDs. She was on PPI for long time. Recently has been stopped. She is using OTC Zantac    - Continue     # Hyponatremia: ( Resolved)   Likely due to hypovolemia, which resolved with IV fluids     # Anxiety and Depression:   Stable   Continue  paxil          Pt's care was discussed with bedside RN, patient and  during Care Team Rounds.               Devan Morales MD  Hospitalist ( Internal medicine)  Pager: [phone]

## 2019-08-06 NOTE — PROGRESS NOTES
Care Coordinator Progress Note    Admission Date/Time:  7/29/2019  Attending MD:  Jeremiah Carrion*    Data  Chart reviewed, discussed with interdisciplinary team.   Patient was admitted for: S/P spinal surgery.    Coordination of Care and Referrals: Finalized home care orders sent to [facility]. Fax [phone]     Plan  Anticipated Discharge Date:  8/6/2019  Anticipated Discharge Plan:  Home with home care.    Cely Rodriguez RN, BSN  Care Coordinator, 8A  Phone [phone]  Pager [phone]

## 2019-08-06 NOTE — PLAN OF CARE
"      VS:   /66 (BP Location: Left arm)   Pulse 76   Temp 98.9  F (37.2  C) (Oral)   Resp 15   Ht 1.727 m (5' 8\")   Wt 90.2 kg (198 lb 13.7 oz)   SpO2 96%   BMI 30.24 kg/m       Output:   BM yesterday. Voiding well and independently   Lungs Lung sounds clear   Activity:   Up to walk independently with walker. Ambulated in room during shift   Skin: WDL ex incisions. Some edema in LLE. Leg elevated.   Pain:   Controled with 15 oxy q3 and atarax Q6, TID flexril   Neuro/CMS:   CMS intact, numbness on both thighs, surgeon aware. A&O x4.   Dressing(s):   CDI   Diet:   Regular, tolerating well   LDA:   PIV saline locked   Equipment:   Walker and call light in use   Plan:   Discharge tomorrow in AM. Needs to be out early so that daughter can get back to work. Discharge meds are ready and in cabinet   Additional Info:          "

## 2019-08-06 NOTE — PLAN OF CARE
Pt given second prescription for oxycodone 5-10 every 4 hours #70 and not to start before 8-. Clinic notified of prescription, pt aware of when she can fill it.

## 2019-08-06 NOTE — PLAN OF CARE
Patient determined adequate for discharge and will return in 2-3 weeks for f/u surgery here.  Patient alert/oriented x4, has ongoing pain that was managed with Oxy q 3 hours 15mg.  Patient has been ambulating independently without concerns and appetite is good and bowel management is managed-per pt has had bm and did not need miralax or senna. Patient's medications were given from our discharge pharmacy and discharge summary was reviewed, schedule for medrol was reviewed per pharmacy and instructions added to discharge paperwork.  Patient has aquacel to site that is intact and left hip graft site JAMEL.  PIV's were discontinued and patient was escorted via transport with daughter as ride to home.

## 2019-08-07 NOTE — PROGRESS NOTES
[facility]: Post-Discharge Note  SITUATION                                                      Admission:    Admission Date: 07/29/19   Reason for Admission: Acquired Kyphosis, Lumbar Spondylosis, Lumbar Radicular Pain  Discharge:   Discharge Date: 08/06/19  Discharge Diagnosis: Acquired Kyphosis, Lumbar Spondylosis, Lumbar Radicular Pain  Discharge Service: Orthopedics     BACKGROUND                                                      This 54 year old female saw Dr. Carrion of Spine Surgery for evaluation. Treatment was recommended, which included anterior exposure for the L4-S1 disk spaces.  I was consulted for exposure via retroperitoneal exposure.    ASSESSMENT      Discharge Assessment  Patient reports symptoms are: Unchanged  Does the patient have all of their medications?: Yes  Does patient know what their new medications are for?: Yes  Does patient have a follow-up appointment scheduled?: Yes  Does patient have any other questions or concerns?: No    Post-op  Did the patient have surgery or a procedure: Yes  Incision: other(Patient reports the bandage is still covered but it looks good otherwise)  Drainage: No  Bleeding: none  Fever: No  Chills: No  Redness: No  Warmth: No  Swelling: No  Incision site pain: No  Eating & Drinking: eating and drinking without complaints/concerns  PO Intake: regular diet  Bowel Function: normal  Urinary Status: voiding without complaint/concerns    PLAN                                                      Future Appointments   Date Time Provider Department Center   9/5/2019 10:30 AM Jeremiah Carrion MD [facility]   10/29/2019 10:30 AM Jeremiah Carrion MD [facility]           Jessenia Underwood, [facility]

## 2019-08-08 ENCOUNTER — TELEPHONE (OUTPATIENT)
Dept: CARE COORDINATION | Facility: CLINIC | Age: 54
End: 2019-08-08

## 2019-08-08 NOTE — TELEPHONE ENCOUNTER
Patient discharged 8/6/19 with home care. [facility] (Phone:[phone]) called to state that they talked to the patient and she refused home PT and RN.   8-12-19:: See phone message. I called pt. Today for eval.   She states she is safe & steady & not at risk of falling & has lots of help including with meds. from relatives & friends & does not need PT or RN.   Next surgery Revision scheduled for 8-23-19.  Belinda Gary RN,.

## 2019-08-12 ENCOUNTER — MYC MEDICAL ADVICE (OUTPATIENT)
Dept: ORTHOPEDICS | Facility: CLINIC | Age: 54
End: 2019-08-12

## 2019-08-12 NOTE — TELEPHONE ENCOUNTER
See my Chart message.  I called pt to let her know that  &  were able to find a date for her Revision surgery on 8-23-19 check in at 0630 at [facility].  Since pt refused a repeat H&P appt. With PAC clinic,  agreed to revise her H&P the morning of surgery.  Last Type & screen done 7-10-19 negative so will be redrawn am of surgery.  Pt. Was +MRSA swab & still has  plenty of Nasal treatment ointment & scrub soap   Left,  so she knows to repeat both treatments starting 5 days before.  Call back prn.  Belinda Gary RN.

## 2019-08-18 ENCOUNTER — MYC MEDICAL ADVICE (OUTPATIENT)
Dept: ORTHOPEDICS | Facility: CLINIC | Age: 54
End: 2019-08-18

## 2019-08-19 DIAGNOSIS — Z98.890 S/P SPINAL SURGERY: ICD-10-CM

## 2019-08-19 RX ORDER — OXYCODONE HYDROCHLORIDE 10 MG/1
5-10 TABLET ORAL EVERY 4 HOURS PRN
Qty: 42 TABLET | Refills: 0 | Status: ON HOLD | OUTPATIENT
Start: 2019-08-19 | End: 2019-08-26

## 2019-08-19 NOTE — TELEPHONE ENCOUNTER
Script faxed to [facility] per patient's request.   Patient stated her pain meds was written inaccurately by Sherice Hagen and pharmacy is not able fill the script.    Reinier Paula RN

## 2019-08-19 NOTE — TELEPHONE ENCOUNTER
Patient will  meds from the lockbox as pain med script is not valid through fax to pharmacy. Patient verbalized understanding.    Reinier Paula RN

## 2019-08-22 NOTE — TELEPHONE ENCOUNTER
Pharmacy Filling the Rx: [facility] [place], [place] - [address]  Filling Pharmacy Phone: [phone]  Filling Pharmacy Fax: [phone]    [facility]  [address]  [address], [place] [address]   [email or web address]  [email or web address]   Phone: [phone]  Pager: [phone]  Fax: [phone]

## 2019-08-23 ENCOUNTER — APPOINTMENT (OUTPATIENT)
Dept: GENERAL RADIOLOGY | Facility: CLINIC | Age: 54
End: 2019-08-23
Attending: ORTHOPAEDIC SURGERY
Payer: COMMERCIAL

## 2019-08-23 ENCOUNTER — HOSPITAL ENCOUNTER (INPATIENT)
Facility: CLINIC | Age: 54
LOS: 3 days | Discharge: HOME | End: 2019-08-26
Attending: ORTHOPAEDIC SURGERY | Admitting: ORTHOPAEDIC SURGERY
Payer: COMMERCIAL

## 2019-08-23 ENCOUNTER — ANESTHESIA (OUTPATIENT)
Dept: SURGERY | Facility: CLINIC | Age: 54
End: 2019-08-23
Payer: COMMERCIAL

## 2019-08-23 ENCOUNTER — ANESTHESIA EVENT (OUTPATIENT)
Dept: SURGERY | Facility: CLINIC | Age: 54
End: 2019-08-23
Payer: COMMERCIAL

## 2019-08-23 DIAGNOSIS — Z98.890 S/P SPINAL SURGERY: ICD-10-CM

## 2019-08-23 DIAGNOSIS — Z98.890 STATUS POST SPINAL SURGERY: Primary | ICD-10-CM

## 2019-08-23 PROBLEM — Z28.21 REFUSED INFLUENZA VACCINE: Status: ACTIVE | Noted: 2017-12-18

## 2019-08-23 PROBLEM — M62.838 CERVICAL PARASPINAL MUSCLE SPASM: Status: ACTIVE | Noted: 2018-06-13

## 2019-08-23 PROBLEM — E11.9 TYPE 2 DIABETES MELLITUS WITHOUT COMPLICATION, WITHOUT LONG-TERM CURRENT USE OF INSULIN (H): Status: ACTIVE | Noted: 2017-04-04

## 2019-08-23 LAB
ABO + RH BLD: NORMAL
ABO + RH BLD: NORMAL
BLD GP AB SCN SERPL QL: NORMAL
BLOOD BANK CMNT PATIENT-IMP: NORMAL
CREAT SERPL-MCNC: 0.94 MG/DL (ref 0.52–1.04)
GFR SERPL CREATININE-BSD FRML MDRD: 68 ML/MIN/{1.73_M2}
GLUCOSE BLDC GLUCOMTR-MCNC: 110 MG/DL (ref 70–99)
GLUCOSE BLDC GLUCOMTR-MCNC: 146 MG/DL (ref 70–99)
GLUCOSE BLDC GLUCOMTR-MCNC: 189 MG/DL (ref 70–99)
HBA1C MFR BLD: 6.4 % (ref 0–5.6)
HGB BLD-MCNC: 8.8 G/DL (ref 11.7–15.7)
POTASSIUM SERPL-SCNC: 3.5 MMOL/L (ref 3.4–5.3)
SPECIMEN EXP DATE BLD: NORMAL

## 2019-08-23 PROCEDURE — 25000132 ZZH RX MED GY IP 250 OP 250 PS 637: Performed by: PHYSICIAN ASSISTANT

## 2019-08-23 PROCEDURE — 85018 HEMOGLOBIN: CPT | Performed by: ANESTHESIOLOGY

## 2019-08-23 PROCEDURE — 86900 BLOOD TYPING SEROLOGIC ABO: CPT | Performed by: ANESTHESIOLOGY

## 2019-08-23 PROCEDURE — 37000009 ZZH ANESTHESIA TECHNICAL FEE, EACH ADDTL 15 MIN: Performed by: ORTHOPAEDIC SURGERY

## 2019-08-23 PROCEDURE — 99232 SBSQ HOSP IP/OBS MODERATE 35: CPT | Performed by: HOSPITALIST

## 2019-08-23 PROCEDURE — 37000008 ZZH ANESTHESIA TECHNICAL FEE, 1ST 30 MIN: Performed by: ORTHOPAEDIC SURGERY

## 2019-08-23 PROCEDURE — 00000146 ZZHCL STATISTIC GLUCOSE BY METER IP

## 2019-08-23 PROCEDURE — 25800030 ZZH RX IP 258 OP 636: Performed by: NURSE ANESTHETIST, CERTIFIED REGISTERED

## 2019-08-23 PROCEDURE — 87070 CULTURE OTHR SPECIMN AEROBIC: CPT | Performed by: ORTHOPAEDIC SURGERY

## 2019-08-23 PROCEDURE — 25000128 H RX IP 250 OP 636: Performed by: ANESTHESIOLOGY

## 2019-08-23 PROCEDURE — 25000128 H RX IP 250 OP 636: Performed by: NURSE ANESTHETIST, CERTIFIED REGISTERED

## 2019-08-23 PROCEDURE — 36000070 ZZH SURGERY LEVEL 5 EA 15 ADDTL MIN - UMMC: Performed by: ORTHOPAEDIC SURGERY

## 2019-08-23 PROCEDURE — 25000128 H RX IP 250 OP 636: Performed by: PHYSICIAN ASSISTANT

## 2019-08-23 PROCEDURE — 0SP00JZ REMOVAL OF SYNTHETIC SUBSTITUTE FROM LUMBAR VERTEBRAL JOINT, OPEN APPROACH: ICD-10-PCS | Performed by: ORTHOPAEDIC SURGERY

## 2019-08-23 PROCEDURE — 25000566 ZZH SEVOFLURANE, EA 15 MIN: Performed by: ORTHOPAEDIC SURGERY

## 2019-08-23 PROCEDURE — 71000015 ZZH RECOVERY PHASE 1 LEVEL 2 EA ADDTL HR: Performed by: ORTHOPAEDIC SURGERY

## 2019-08-23 PROCEDURE — 27210995 ZZH RX 272: Performed by: ORTHOPAEDIC SURGERY

## 2019-08-23 PROCEDURE — 86901 BLOOD TYPING SEROLOGIC RH(D): CPT | Performed by: ANESTHESIOLOGY

## 2019-08-23 PROCEDURE — 36415 COLL VENOUS BLD VENIPUNCTURE: CPT | Performed by: ANESTHESIOLOGY

## 2019-08-23 PROCEDURE — 0SG00A0 FUSION OF LUMBAR VERTEBRAL JOINT WITH INTERBODY FUSION DEVICE, ANTERIOR APPROACH, ANTERIOR COLUMN, OPEN APPROACH: ICD-10-PCS | Performed by: ORTHOPAEDIC SURGERY

## 2019-08-23 PROCEDURE — 99207 ZZC CDG-HISTORY COMP: MEETS EXP. PROBLEM FOCUSED - DOWN CODED LACK OF HPI: CPT | Performed by: HOSPITALIST

## 2019-08-23 PROCEDURE — 27210794 ZZH OR GENERAL SUPPLY STERILE: Performed by: ORTHOPAEDIC SURGERY

## 2019-08-23 PROCEDURE — 25000125 ZZHC RX 250: Performed by: PHYSICIAN ASSISTANT

## 2019-08-23 PROCEDURE — 71000014 ZZH RECOVERY PHASE 1 LEVEL 2 FIRST HR: Performed by: ORTHOPAEDIC SURGERY

## 2019-08-23 PROCEDURE — 87075 CULTR BACTERIA EXCEPT BLOOD: CPT | Performed by: ORTHOPAEDIC SURGERY

## 2019-08-23 PROCEDURE — 25000125 ZZHC RX 250: Performed by: NURSE ANESTHETIST, CERTIFIED REGISTERED

## 2019-08-23 PROCEDURE — 83036 HEMOGLOBIN GLYCOSYLATED A1C: CPT | Performed by: HOSPITALIST

## 2019-08-23 PROCEDURE — 36000068 ZZH SURGERY LEVEL 5 1ST 30 MIN - UMMC: Performed by: ORTHOPAEDIC SURGERY

## 2019-08-23 PROCEDURE — 25000125 ZZHC RX 250: Performed by: ORTHOPAEDIC SURGERY

## 2019-08-23 PROCEDURE — 40000171 ZZH STATISTIC PRE-PROCEDURE ASSESSMENT III: Performed by: ORTHOPAEDIC SURGERY

## 2019-08-23 PROCEDURE — C1713 ANCHOR/SCREW BN/BN,TIS/BN: HCPCS | Performed by: ORTHOPAEDIC SURGERY

## 2019-08-23 PROCEDURE — 86850 RBC ANTIBODY SCREEN: CPT | Performed by: ANESTHESIOLOGY

## 2019-08-23 PROCEDURE — 25800030 ZZH RX IP 258 OP 636: Performed by: PHYSICIAN ASSISTANT

## 2019-08-23 PROCEDURE — 82565 ASSAY OF CREATININE: CPT | Performed by: ANESTHESIOLOGY

## 2019-08-23 PROCEDURE — 40000278 XR SURGERY CARM FLUORO LESS THAN 5 MIN: Mod: TC

## 2019-08-23 PROCEDURE — C1762 CONN TISS, HUMAN(INC FASCIA): HCPCS | Performed by: ORTHOPAEDIC SURGERY

## 2019-08-23 PROCEDURE — 12000001 ZZH R&B MED SURG/OB UMMC

## 2019-08-23 PROCEDURE — 84132 ASSAY OF SERUM POTASSIUM: CPT | Performed by: ANESTHESIOLOGY

## 2019-08-23 PROCEDURE — 27211024 ZZHC OR SUPPLY OTHER OPNP: Performed by: ORTHOPAEDIC SURGERY

## 2019-08-23 PROCEDURE — 0TN70ZZ RELEASE LEFT URETER, OPEN APPROACH: ICD-10-PCS | Performed by: SURGERY

## 2019-08-23 DEVICE — GRAFT BONE CRUSH CANC 15ML 400075: Type: IMPLANTABLE DEVICE | Site: SPINE LUMBAR | Status: FUNCTIONAL

## 2019-08-23 DEVICE — IMPLANTABLE DEVICE: Type: IMPLANTABLE DEVICE | Site: SPINE LUMBAR | Status: FUNCTIONAL

## 2019-08-23 RX ORDER — CEFAZOLIN SODIUM 1 G/3ML
1 INJECTION, POWDER, FOR SOLUTION INTRAMUSCULAR; INTRAVENOUS EVERY 8 HOURS
Status: COMPLETED | OUTPATIENT
Start: 2019-08-23 | End: 2019-08-24

## 2019-08-23 RX ORDER — FENTANYL CITRATE 50 UG/ML
INJECTION, SOLUTION INTRAMUSCULAR; INTRAVENOUS PRN
Status: DISCONTINUED | OUTPATIENT
Start: 2019-08-23 | End: 2019-08-23

## 2019-08-23 RX ORDER — METOCLOPRAMIDE 5 MG/1
10 TABLET ORAL EVERY 6 HOURS PRN
Status: DISCONTINUED | OUTPATIENT
Start: 2019-08-23 | End: 2019-08-26 | Stop reason: HOSPADM

## 2019-08-23 RX ORDER — MEPERIDINE HYDROCHLORIDE 25 MG/ML
12.5 INJECTION INTRAMUSCULAR; INTRAVENOUS; SUBCUTANEOUS
Status: DISCONTINUED | OUTPATIENT
Start: 2019-08-23 | End: 2019-08-23 | Stop reason: HOSPADM

## 2019-08-23 RX ORDER — SODIUM CHLORIDE, SODIUM LACTATE, POTASSIUM CHLORIDE, CALCIUM CHLORIDE 600; 310; 30; 20 MG/100ML; MG/100ML; MG/100ML; MG/100ML
INJECTION, SOLUTION INTRAVENOUS CONTINUOUS
Status: DISCONTINUED | OUTPATIENT
Start: 2019-08-23 | End: 2019-08-23 | Stop reason: HOSPADM

## 2019-08-23 RX ORDER — CEFAZOLIN SODIUM 1 G/3ML
1 INJECTION, POWDER, FOR SOLUTION INTRAMUSCULAR; INTRAVENOUS SEE ADMIN INSTRUCTIONS
Status: DISCONTINUED | OUTPATIENT
Start: 2019-08-23 | End: 2019-08-23 | Stop reason: HOSPADM

## 2019-08-23 RX ORDER — PROPOFOL 10 MG/ML
INJECTION, EMULSION INTRAVENOUS PRN
Status: DISCONTINUED | OUTPATIENT
Start: 2019-08-23 | End: 2019-08-23

## 2019-08-23 RX ORDER — DIPHENHYDRAMINE HCL 25 MG
25 CAPSULE ORAL EVERY 6 HOURS PRN
Status: DISCONTINUED | OUTPATIENT
Start: 2019-08-23 | End: 2019-08-26 | Stop reason: HOSPADM

## 2019-08-23 RX ORDER — DIPHENHYDRAMINE HYDROCHLORIDE 50 MG/ML
25 INJECTION INTRAMUSCULAR; INTRAVENOUS EVERY 6 HOURS PRN
Status: DISCONTINUED | OUTPATIENT
Start: 2019-08-23 | End: 2019-08-26 | Stop reason: HOSPADM

## 2019-08-23 RX ORDER — CYCLOBENZAPRINE HCL 10 MG
10 TABLET ORAL 2 TIMES DAILY PRN
Status: DISCONTINUED | OUTPATIENT
Start: 2019-08-23 | End: 2019-08-26 | Stop reason: HOSPADM

## 2019-08-23 RX ORDER — POLYETHYLENE GLYCOL 3350 17 G/17G
17 POWDER, FOR SOLUTION ORAL 2 TIMES DAILY
Status: DISCONTINUED | OUTPATIENT
Start: 2019-08-23 | End: 2019-08-26 | Stop reason: HOSPADM

## 2019-08-23 RX ORDER — FENTANYL CITRATE 50 UG/ML
25-50 INJECTION, SOLUTION INTRAMUSCULAR; INTRAVENOUS
Status: DISCONTINUED | OUTPATIENT
Start: 2019-08-23 | End: 2019-08-23 | Stop reason: HOSPADM

## 2019-08-23 RX ORDER — HYDROXYZINE HYDROCHLORIDE 25 MG/1
25 TABLET, FILM COATED ORAL 2 TIMES DAILY PRN
Status: DISCONTINUED | OUTPATIENT
Start: 2019-08-23 | End: 2019-08-26 | Stop reason: HOSPADM

## 2019-08-23 RX ORDER — CEFAZOLIN SODIUM 2 G/100ML
2 INJECTION, SOLUTION INTRAVENOUS
Status: COMPLETED | OUTPATIENT
Start: 2019-08-23 | End: 2019-08-23

## 2019-08-23 RX ORDER — HYDROMORPHONE HYDROCHLORIDE 1 MG/ML
.3-.5 INJECTION, SOLUTION INTRAMUSCULAR; INTRAVENOUS; SUBCUTANEOUS EVERY 10 MIN PRN
Status: DISCONTINUED | OUTPATIENT
Start: 2019-08-23 | End: 2019-08-23 | Stop reason: HOSPADM

## 2019-08-23 RX ORDER — PREGABALIN 25 MG/1
25 CAPSULE ORAL 3 TIMES DAILY
Status: DISCONTINUED | OUTPATIENT
Start: 2019-08-23 | End: 2019-08-26 | Stop reason: HOSPADM

## 2019-08-23 RX ORDER — GABAPENTIN 100 MG/1
300 CAPSULE ORAL
Status: COMPLETED | OUTPATIENT
Start: 2019-08-23 | End: 2019-08-23

## 2019-08-23 RX ORDER — DEXAMETHASONE SODIUM PHOSPHATE 4 MG/ML
INJECTION, SOLUTION INTRA-ARTICULAR; INTRALESIONAL; INTRAMUSCULAR; INTRAVENOUS; SOFT TISSUE PRN
Status: DISCONTINUED | OUTPATIENT
Start: 2019-08-23 | End: 2019-08-23

## 2019-08-23 RX ORDER — ACETAMINOPHEN 500 MG
1000 TABLET ORAL EVERY 6 HOURS PRN
Status: DISCONTINUED | OUTPATIENT
Start: 2019-08-23 | End: 2019-08-24

## 2019-08-23 RX ORDER — ONDANSETRON 2 MG/ML
4 INJECTION INTRAMUSCULAR; INTRAVENOUS EVERY 30 MIN PRN
Status: DISCONTINUED | OUTPATIENT
Start: 2019-08-23 | End: 2019-08-23 | Stop reason: HOSPADM

## 2019-08-23 RX ORDER — LIDOCAINE 40 MG/G
CREAM TOPICAL
Status: DISCONTINUED | OUTPATIENT
Start: 2019-08-23 | End: 2019-08-23 | Stop reason: HOSPADM

## 2019-08-23 RX ORDER — METOCLOPRAMIDE HYDROCHLORIDE 5 MG/ML
10 INJECTION INTRAMUSCULAR; INTRAVENOUS EVERY 6 HOURS PRN
Status: DISCONTINUED | OUTPATIENT
Start: 2019-08-23 | End: 2019-08-26 | Stop reason: HOSPADM

## 2019-08-23 RX ORDER — LISINOPRIL/HYDROCHLOROTHIAZIDE 10-12.5 MG
1 TABLET ORAL DAILY
Status: DISCONTINUED | OUTPATIENT
Start: 2019-08-23 | End: 2019-08-23

## 2019-08-23 RX ORDER — NICOTINE POLACRILEX 4 MG
15-30 LOZENGE BUCCAL
Status: DISCONTINUED | OUTPATIENT
Start: 2019-08-23 | End: 2019-08-26 | Stop reason: HOSPADM

## 2019-08-23 RX ORDER — SODIUM CHLORIDE, SODIUM LACTATE, POTASSIUM CHLORIDE, CALCIUM CHLORIDE 600; 310; 30; 20 MG/100ML; MG/100ML; MG/100ML; MG/100ML
INJECTION, SOLUTION INTRAVENOUS CONTINUOUS PRN
Status: DISCONTINUED | OUTPATIENT
Start: 2019-08-23 | End: 2019-08-23

## 2019-08-23 RX ORDER — AMOXICILLIN 250 MG
1 CAPSULE ORAL 2 TIMES DAILY
Status: DISCONTINUED | OUTPATIENT
Start: 2019-08-23 | End: 2019-08-24

## 2019-08-23 RX ORDER — ONDANSETRON 4 MG/1
4 TABLET, ORALLY DISINTEGRATING ORAL EVERY 30 MIN PRN
Status: DISCONTINUED | OUTPATIENT
Start: 2019-08-23 | End: 2019-08-23 | Stop reason: HOSPADM

## 2019-08-23 RX ORDER — HYDROMORPHONE HYDROCHLORIDE 2 MG/1
2-4 TABLET ORAL
Status: DISCONTINUED | OUTPATIENT
Start: 2019-08-23 | End: 2019-08-24

## 2019-08-23 RX ORDER — ERGOCALCIFEROL 1.25 MG/1
50000 CAPSULE, LIQUID FILLED ORAL WEEKLY
Status: DISCONTINUED | OUTPATIENT
Start: 2019-08-23 | End: 2019-08-26 | Stop reason: HOSPADM

## 2019-08-23 RX ORDER — ACETAMINOPHEN 325 MG/1
975 TABLET ORAL ONCE
Status: COMPLETED | OUTPATIENT
Start: 2019-08-23 | End: 2019-08-23

## 2019-08-23 RX ORDER — LIDOCAINE 40 MG/G
CREAM TOPICAL
Status: DISCONTINUED | OUTPATIENT
Start: 2019-08-23 | End: 2019-08-26 | Stop reason: HOSPADM

## 2019-08-23 RX ORDER — HYDRALAZINE HYDROCHLORIDE 20 MG/ML
10 INJECTION INTRAMUSCULAR; INTRAVENOUS EVERY 6 HOURS PRN
Status: DISCONTINUED | OUTPATIENT
Start: 2019-08-23 | End: 2019-08-26 | Stop reason: HOSPADM

## 2019-08-23 RX ORDER — BUPIVACAINE HYDROCHLORIDE AND EPINEPHRINE 2.5; 5 MG/ML; UG/ML
INJECTION, SOLUTION INFILTRATION; PERINEURAL PRN
Status: DISCONTINUED | OUTPATIENT
Start: 2019-08-23 | End: 2019-08-23 | Stop reason: HOSPADM

## 2019-08-23 RX ORDER — PROCHLORPERAZINE MALEATE 5 MG
10 TABLET ORAL EVERY 6 HOURS PRN
Status: DISCONTINUED | OUTPATIENT
Start: 2019-08-23 | End: 2019-08-26 | Stop reason: HOSPADM

## 2019-08-23 RX ORDER — NALOXONE HYDROCHLORIDE 0.4 MG/ML
.1-.4 INJECTION, SOLUTION INTRAMUSCULAR; INTRAVENOUS; SUBCUTANEOUS
Status: DISCONTINUED | OUTPATIENT
Start: 2019-08-23 | End: 2019-08-26 | Stop reason: HOSPADM

## 2019-08-23 RX ORDER — ONDANSETRON 4 MG/1
4 TABLET, ORALLY DISINTEGRATING ORAL EVERY 6 HOURS PRN
Status: DISCONTINUED | OUTPATIENT
Start: 2019-08-23 | End: 2019-08-26 | Stop reason: HOSPADM

## 2019-08-23 RX ORDER — ONDANSETRON 2 MG/ML
4 INJECTION INTRAMUSCULAR; INTRAVENOUS EVERY 6 HOURS PRN
Status: DISCONTINUED | OUTPATIENT
Start: 2019-08-23 | End: 2019-08-26 | Stop reason: HOSPADM

## 2019-08-23 RX ORDER — LIDOCAINE HYDROCHLORIDE 20 MG/ML
INJECTION, SOLUTION INFILTRATION; PERINEURAL PRN
Status: DISCONTINUED | OUTPATIENT
Start: 2019-08-23 | End: 2019-08-23

## 2019-08-23 RX ORDER — ONDANSETRON 2 MG/ML
INJECTION INTRAMUSCULAR; INTRAVENOUS PRN
Status: DISCONTINUED | OUTPATIENT
Start: 2019-08-23 | End: 2019-08-23

## 2019-08-23 RX ORDER — DEXTROSE MONOHYDRATE 25 G/50ML
25-50 INJECTION, SOLUTION INTRAVENOUS
Status: DISCONTINUED | OUTPATIENT
Start: 2019-08-23 | End: 2019-08-26 | Stop reason: HOSPADM

## 2019-08-23 RX ORDER — NALOXONE HYDROCHLORIDE 0.4 MG/ML
.1-.4 INJECTION, SOLUTION INTRAMUSCULAR; INTRAVENOUS; SUBCUTANEOUS
Status: DISCONTINUED | OUTPATIENT
Start: 2019-08-23 | End: 2019-08-23 | Stop reason: HOSPADM

## 2019-08-23 RX ADMIN — MIDAZOLAM 2 MG: 1 INJECTION INTRAMUSCULAR; INTRAVENOUS at 09:42

## 2019-08-23 RX ADMIN — FENTANYL CITRATE 50 MCG: 50 INJECTION, SOLUTION INTRAMUSCULAR; INTRAVENOUS at 12:41

## 2019-08-23 RX ADMIN — FENTANYL CITRATE 50 MCG: 50 INJECTION, SOLUTION INTRAMUSCULAR; INTRAVENOUS at 09:58

## 2019-08-23 RX ADMIN — SODIUM CHLORIDE 10 MG/KG/HR: 9 INJECTION, SOLUTION INTRAVENOUS at 10:51

## 2019-08-23 RX ADMIN — Medication: at 16:10

## 2019-08-23 RX ADMIN — HYDROMORPHONE HYDROCHLORIDE 0.5 MG: 1 INJECTION, SOLUTION INTRAMUSCULAR; INTRAVENOUS; SUBCUTANEOUS at 13:45

## 2019-08-23 RX ADMIN — FENTANYL CITRATE 50 MCG: 50 INJECTION, SOLUTION INTRAMUSCULAR; INTRAVENOUS at 11:02

## 2019-08-23 RX ADMIN — HYDROMORPHONE HYDROCHLORIDE 0.3 MG: 1 INJECTION, SOLUTION INTRAMUSCULAR; INTRAVENOUS; SUBCUTANEOUS at 15:21

## 2019-08-23 RX ADMIN — HYDROMORPHONE HYDROCHLORIDE 0.4 MG: 1 INJECTION, SOLUTION INTRAMUSCULAR; INTRAVENOUS; SUBCUTANEOUS at 15:06

## 2019-08-23 RX ADMIN — ONDANSETRON 4 MG: 2 INJECTION INTRAMUSCULAR; INTRAVENOUS at 13:45

## 2019-08-23 RX ADMIN — FENTANYL CITRATE 50 MCG: 50 INJECTION, SOLUTION INTRAMUSCULAR; INTRAVENOUS at 10:45

## 2019-08-23 RX ADMIN — LIDOCAINE HYDROCHLORIDE 100 MG: 20 INJECTION, SOLUTION INFILTRATION; PERINEURAL at 09:58

## 2019-08-23 RX ADMIN — FENTANYL CITRATE 50 MCG: 50 INJECTION, SOLUTION INTRAMUSCULAR; INTRAVENOUS at 10:36

## 2019-08-23 RX ADMIN — HYDROMORPHONE HYDROCHLORIDE 0.3 MG: 1 INJECTION, SOLUTION INTRAMUSCULAR; INTRAVENOUS; SUBCUTANEOUS at 14:52

## 2019-08-23 RX ADMIN — CEFAZOLIN SODIUM 2 G: 2 INJECTION, SOLUTION INTRAVENOUS at 10:20

## 2019-08-23 RX ADMIN — ROCURONIUM BROMIDE 20 MG: 10 INJECTION INTRAVENOUS at 12:35

## 2019-08-23 RX ADMIN — FENTANYL CITRATE 50 MCG: 50 INJECTION, SOLUTION INTRAMUSCULAR; INTRAVENOUS at 11:52

## 2019-08-23 RX ADMIN — CEFAZOLIN 1 G: 1 INJECTION, POWDER, FOR SOLUTION INTRAMUSCULAR; INTRAVENOUS at 12:10

## 2019-08-23 RX ADMIN — PREGABALIN 25 MG: 25 CAPSULE ORAL at 20:07

## 2019-08-23 RX ADMIN — PROPOFOL 200 MG: 10 INJECTION, EMULSION INTRAVENOUS at 09:58

## 2019-08-23 RX ADMIN — ROCURONIUM BROMIDE 20 MG: 10 INJECTION INTRAVENOUS at 12:06

## 2019-08-23 RX ADMIN — DEXAMETHASONE SODIUM PHOSPHATE 4 MG: 4 INJECTION, SOLUTION INTRAMUSCULAR; INTRAVENOUS at 10:27

## 2019-08-23 RX ADMIN — ROCURONIUM BROMIDE 20 MG: 10 INJECTION INTRAVENOUS at 11:22

## 2019-08-23 RX ADMIN — ACETAMINOPHEN 975 MG: 325 TABLET, FILM COATED ORAL at 07:30

## 2019-08-23 RX ADMIN — SODIUM CHLORIDE, POTASSIUM CHLORIDE, SODIUM LACTATE AND CALCIUM CHLORIDE: 600; 310; 30; 20 INJECTION, SOLUTION INTRAVENOUS at 09:03

## 2019-08-23 RX ADMIN — CEFAZOLIN 1 G: 1 INJECTION, POWDER, FOR SOLUTION INTRAMUSCULAR; INTRAVENOUS at 18:19

## 2019-08-23 RX ADMIN — HYDROMORPHONE HYDROCHLORIDE 0.4 MG: 1 INJECTION, SOLUTION INTRAMUSCULAR; INTRAVENOUS; SUBCUTANEOUS at 15:36

## 2019-08-23 RX ADMIN — SUGAMMADEX 200 MG: 100 INJECTION, SOLUTION INTRAVENOUS at 13:58

## 2019-08-23 RX ADMIN — SODIUM CHLORIDE 2660 MG: 9 INJECTION, SOLUTION INTRAVENOUS at 10:15

## 2019-08-23 RX ADMIN — Medication 100 MG: at 09:58

## 2019-08-23 RX ADMIN — SENNOSIDES AND DOCUSATE SODIUM 1 TABLET: 8.6; 5 TABLET ORAL at 20:07

## 2019-08-23 RX ADMIN — GABAPENTIN 300 MG: 300 CAPSULE ORAL at 07:30

## 2019-08-23 RX ADMIN — ROCURONIUM BROMIDE 50 MG: 10 INJECTION INTRAVENOUS at 10:15

## 2019-08-23 RX ADMIN — FENTANYL CITRATE 50 MCG: 50 INJECTION, SOLUTION INTRAMUSCULAR; INTRAVENOUS at 11:34

## 2019-08-23 RX ADMIN — ROCURONIUM BROMIDE 30 MG: 10 INJECTION INTRAVENOUS at 11:52

## 2019-08-23 RX ADMIN — ROCURONIUM BROMIDE 30 MG: 10 INJECTION INTRAVENOUS at 10:36

## 2019-08-23 ASSESSMENT — ACTIVITIES OF DAILY LIVING (ADL)
PRIOR_FUNCTIONAL_LEVEL_COMMENT: INDEPENDENT
TRANSFERRING: 0-->INDEPENDENT
FALL_HISTORY_WITHIN_LAST_SIX_MONTHS: NO
AMBULATION: 0-->INDEPENDENT
SWALLOWING: 0-->SWALLOWS FOODS/LIQUIDS WITHOUT DIFFICULTY
TOILETING: 0-->INDEPENDENT
BATHING: 2-->ASSISTIVE PERSON
RETIRED_EATING: 0-->INDEPENDENT
COGNITION: 0 - NO COGNITION ISSUES REPORTED
DRESS: 0-->INDEPENDENT
ADLS_ACUITY_SCORE: 15
RETIRED_COMMUNICATION: 0-->UNDERSTANDS/COMMUNICATES WITHOUT DIFFICULTY

## 2019-08-23 ASSESSMENT — MIFFLIN-ST. JEOR: SCORE: 1529.5

## 2019-08-23 ASSESSMENT — LIFESTYLE VARIABLES: TOBACCO_USE: 1

## 2019-08-23 NOTE — ANESTHESIA PREPROCEDURE EVALUATION
Anesthesia Pre-Procedure Evaluation    Patient: Yun Sagastume   MRN:     [id number] Gender:   female   Age:    54 year old :      1965        Preoperative Diagnosis: Acquired Kyphosis, Lumbar Spondylosis, Lumbar Radicular Pain   Procedure(s):  (supine) Anterior Lumbar Interbody Fusion Lumbar 4-5, Lumbar 5-Sacral 1: Use Of Infuse BMP (Medium Kit)  (prone) Steatlh Assisted Posterior Instrumented Spinal Fusion Lumbar 3-Pelvis, Transforaminal Lumbar Interbody Fusion (Right Facetectomy), Possible Sanford Carpio Osteotomy Lumbar 3-4,Sanford Carpio Osteotomy Lumbar 4-5, Lumbar 5-Sacral 1: Cement Augmentation Of Screws     Past Medical History:   Diagnosis Date     Colon polyp      Depression      Diabetes (H)     Type 2     Gastric bypass status for obesity      HTN (hypertension)      Osteopenia      Post laminectomy syndrome       Past Surgical History:   Procedure Laterality Date     FUSION LUMBAR ANTERIOR TWO LEVELS N/A 2019    Procedure: (lateral) Anterior Lumbar Interbody Fusion Lumbar 4-5, Lumbar 5-Sacral 1: Use Of Infuse BMP (Medium Kit);  Surgeon: Jeremiah Carrion MD;  Location: [facility] OR     INJECT EPIDURAL TRANSFORAMINAL LUMBAR / SACRAL SINGLE Bilateral 2017    Procedure: INJECT EPIDURAL TRANSFORAMINAL LUMBAR / SACRAL SINGLE;  Bilateral lumbar 4 selective nerve root blocks;  Surgeon: Ney Toure MD;  Location: [facility] OR     INJECT EPIDURAL TRANSFORAMINAL LUMBAR / SACRAL SINGLE Bilateral 2017    Procedure: INJECT EPIDURAL TRANSFORAMINAL LUMBAR / SACRAL SINGLE;  Lumbar 5 Nerve Root Block, Bilateral;  Surgeon: Pedrito Carr MD;  Location: [facility] OR     OPTICAL TRACKING SYSTEM FUSION SPINE POSTERIOR LUMBAR TWO LEVELS N/A 2019    Procedure: (prone) Steatlh Assisted Posterior Instrumented Spinal Fusion Lumbar 3-Pelvis, Transforaminal Lumbar Interbody Fusion L3-L4. (Right Facetectomy), Smith Carpio Osteotomy Lumbar 4-5, Lumbar 5-Sacral 1: Cement Augmentation Of  Screws L3-L4;  Surgeon: Jeremiah Carrion MD;  Location: [facility] OR          Anesthesia Evaluation     . Pt has had prior anesthetic. Type: General, Regional and MAC    History of anesthetic complications   - PONV        ROS/MED HX    ENT/Pulmonary: Comment: Tested and negative    (+)GERALDO risk factors snores loudly, hypertension, tobacco use, Past use 20 pack years - quit 15 years ago packs/day  , . .    Neurologic:     (+)neuropathy - feet, hands: possible Raynaud's , other neuro Balance challenges, cervical paraspinal spasms, chart hx hyperreflexia    Cardiovascular:     (+) hypertension----. : . . . :. valvular problems/murmurs told she had intermittent murmur:. Previous cardiac testing date:results:Stress Testdate:2012 results:STRESS ECHO 2012: negative for objective markers of exertional myocardial ischemia or previous MI. The low level of cardiac stress provoked severely limits the clinical utility of the findings reported herein.   LV function normal EF60%. No RWMA.    date: results: date: results:          METS/Exercise Tolerance:  3 - Able to walk 1-2 blocks without stopping   Hematologic: Comments: Hx anemia - abnormal uterine bleeding    (+) Anemia, History of Transfusion no previous transfusion reaction -      Musculoskeletal:   (+)  other musculoskeletal- osteopenia      GI/Hepatic: Comment: Bariatric surgery status SP Buster-en-Y        Renal/Genitourinary:  - ROS Renal section negative       Endo: Comment: Gestational diabetes    (+) type II DM Last HgA1c: 6.2% date: 12/2018 Not using insulin - not using insulin pump not previously admitted for DM/DKA Diabetic complications: neuropathy, Obesity, .      Psychiatric:     (+) psychiatric history anxiety and depression      Infectious Disease:  - neg infectious disease ROS       Malignancy:      - no malignancy   Other:    (+) No chance of pregnancy H/O Chronic Pain,H/O chronic opiod use ,                        PHYSICAL EXAM:   Mental Status/Neuro: A/A/O  "  Airway: Facies: Feasible  Mallampati: II  Mouth/Opening: Full  TM distance: > 6 cm  Neck ROM: Full   Respiratory: Auscultation: CTAB     Resp. Rate: Normal     Resp. Effort: Normal      CV: Rhythm: Regular  Rate: Age appropriate  Heart: Normal Sounds  Edema: None   Comments:      Dental: Normal Dentition                LABS:  CBC:   Lab Results   Component Value Date    WBC 8.1 07/30/2019    WBC 5.0 07/10/2019    HGB 8.8 (L) 08/23/2019    HGB 8.6 (L) 08/06/2019    HCT 28.9 (L) 07/30/2019    HCT 40.2 07/10/2019     07/30/2019     07/10/2019     BMP:   Lab Results   Component Value Date     08/06/2019     07/30/2019    POTASSIUM 3.5 08/23/2019    POTASSIUM 4.3 08/06/2019    CHLORIDE 106 08/06/2019    CHLORIDE 102 07/30/2019    CO2 27 08/06/2019    CO2 28 07/30/2019    BUN 9 08/06/2019    BUN 15 07/30/2019    CR 0.94 08/23/2019    CR 0.72 08/06/2019     (H) 08/06/2019     (H) 07/30/2019     COAGS: No results found for: PTT, INR, FIBR  POC:   Lab Results   Component Value Date     (H) 08/23/2019     OTHER:   Lab Results   Component Value Date    PH 7.40 07/29/2019    A1C 7.3 (H) 07/10/2019    MARY JO 8.8 08/06/2019        Preop Vitals    BP Readings from Last 3 Encounters:   08/23/19 137/81   08/06/19 112/76   07/10/19 114/76    Pulse Readings from Last 3 Encounters:   08/06/19 73   07/10/19 83   08/30/17 71      Resp Readings from Last 3 Encounters:   08/23/19 15   08/06/19 16   07/10/19 14    SpO2 Readings from Last 3 Encounters:   08/23/19 96%   08/06/19 96%   07/10/19 96%      Temp Readings from Last 1 Encounters:   08/23/19 36.7  C (98.1  F) (Oral)    Ht Readings from Last 1 Encounters:   08/23/19 1.727 m (5' 8\")      Wt Readings from Last 1 Encounters:   08/23/19 88.6 kg (195 lb 5.2 oz)    Estimated body mass index is 29.7 kg/m  as calculated from the following:    Height as of an earlier encounter on 8/23/19: 1.727 m (5' 8\").    Weight as of an earlier encounter on " 8/23/19: 88.6 kg (195 lb 5.2 oz).     LDA:  Peripheral IV 08/23/19 Right Lower forearm (Active)   Site Assessment WDL 8/23/2019  7:48 AM   Line Status Saline locked 8/23/2019  7:48 AM   Phlebitis Scale 0-->no symptoms 8/23/2019  7:48 AM   Number of days: 0        Assessment:   ASA SCORE: 3    H&P: History and physical reviewed and following examination; no interval change.   Smoking Status:  Non-Smoker/Unknown   NPO Status: NPO Appropriate     Plan:   Anes. Type:  General   Pre-Medication: Midazolam   Induction:  IV (Standard)   Airway: ETT; Oral   Access/Monitoring: PIV   Maintenance: Balanced     Postop Plan:   Postop Pain: Opioids  Postop Sedation/Airway: Not planned  Disposition: Outpatient     PONV Management:   Adult Risk Factors: Female, Non-Smoker, Postop Opioids   Prevention: Ondansetron, Dexamethasone, Scopolamine     CONSENT: Direct conversation   Plan and risks discussed with: Patient   Blood Products: Consented (ALL Blood Products)                       Juan Shepherd MD

## 2019-08-23 NOTE — ANESTHESIA CARE TRANSFER NOTE
Patient: Yun Sagastume    Procedure(s):  Anterior Lumbar Interbody Fusion Revision Lumbar 4-5    Diagnosis: Cage Dislogdment  Diagnosis Additional Information: No value filed.    Anesthesia Type:   General     Note:  Airway :Face Mask  Patient transferred to:PACU  Comments: Anesthesia Care Transfer Note      Transfer to:  PACU    Patient Vital Signs:  Stable    Airway:  None    Patient transported to PACU with supplemental O2.  Patient alert and breathing comfortably.  VSS.  Care transferred with report to PACU RN.    Toro James CRNAHandoff Report: Identifed the Patient, Identified the Reponsible Provider, Reviewed the pertinent medical history, Discussed the surgical course, Reviewed Intra-OP anesthesia mangement and issues during anesthesia, Set expectations for post-procedure period and Allowed opportunity for questions and acknowledgement of understanding      Vitals: (Last set prior to Anesthesia Care Transfer)    CRNA VITALS  8/23/2019 1346 - 8/23/2019 1426      8/23/2019             Resp Rate (observed):  9                Electronically Signed By: BILLY Levine CRNA  August 23, 2019  2:26 PM

## 2019-08-23 NOTE — ANESTHESIA POSTPROCEDURE EVALUATION
Anesthesia POST Procedure Evaluation    Patient: Yun Sagastume   MRN:     [id number] Gender:   female   Age:    54 year old :      1965        Preoperative Diagnosis: Cage Dislogdment   Procedure(s):  Anterior Lumbar Interbody Fusion Revision Lumbar 4-5   Postop Comments: No value filed.       Anesthesia Type:  Not documented  General    Reportable Event: NO     PAIN: Uncomplicated   Sign Out status: Comfortable, Well controlled pain     PONV: No PONV   Sign Out status:  No Nausea or Vomiting     Neuro/Psych: Uneventful perioperative course   Sign Out Status: Preoperative baseline; Age appropriate mentation     Airway/Resp.: Uneventful perioperative course   Sign Out Status: Non labored breathing, age appropriate RR; Resp. Status within EXPECTED Parameters     CV: Uneventful perioperative course   Sign Out status: Appropriate BP and perfusion indices; Appropriate HR/Rhythm     Disposition:   Sign Out in:  PACU  Disposition:  Phase II; Home  Recovery Course: Uneventful  Follow-Up: Not required           Last Anesthesia Record Vitals:  CRNA VITALS  2019 1346 - 2019 1446      2019             Resp Rate (observed):  9          Last PACU Vitals:  Vitals Value Taken Time   /88 2019  2:45 PM   Temp     Pulse 77 2019  2:45 PM   Resp 9 2019  2:59 PM   SpO2 97 % 2019  2:59 PM   Temp src     NIBP     Pulse     SpO2     Resp     Temp     Ht Rate     Temp 2     Vitals shown include unvalidated device data.      Electronically Signed By: Mk Geronimo MD, 2019, 3:01 PM

## 2019-08-23 NOTE — BRIEF OP NOTE
Brief Operative Note    Preop Dx:   Cage Dislodgement  Post op Dx:   Same  Procedure:    Procedure(s):  Anterior Lumbar Interbody Fusion Revision Lumbar 4-5  Surgeon:     Jeremiah Carrion MD, Casper Grant MD  Assistants:    Rod Madera PA-C  Anesthesia:   General  EBL:    50 ml  Total IV Fluids:   See Anesthesia Record  Specimens:   Aerobic and anaerobic swabs  Findings:   See Operative Dictation    Assessment and Plan: Yun Sagastume is a 54 year old female with PMH including HTN, Depression, osteopenia, s/p gastric bypass, Vit D defic, abnormal uterine bleeding, anemia, balance problem, cervical paraspinal spasm, atypical chest pain, broken controlled substance agreement, esoph reflux, hyper reflexia, post laminectomy syndrome, Type II DM now s/p Procedure(s):  Anterior Lumbar Interbody Fusion Revision Lumbar 4-5 on 8/23/19 with Dr. Carrion.     Murali Primary  Activity: Up with assist until independent. No excessive bending or twisting. No lifting >10 lbs x 6 weeks. No Shelia lift for transfers.   Weight bearing status: WBAT.  Pain management: Transition from IV to PO as tolerated. No NSAIDs   Antibiotics: Ancef 1 gm q 8 hours x 24 hours.  Diet: Begin with clear fluids and progress diet as tolerated to diabetic diet.   DVT prophylaxis: SCDs only. No chemical DVT ppx needed.  Imaging: XR Upright  PTDC - ordered.  Labs: Hgb POD#1.  Bracing/Splinting: None.  Dressings: left lateral abdominal incision Dermabond.  Drains: None.  Schuster catheter: Remove POD#1.   Physical Therapy/Occupational Therapy: Eval and treat.  Cultures: Pending, follow culture results closely.    Consults: Hospitalist.  Follow-up: Clinic with Dr. Carrion in 6 weeks with repeat x-rays.   Disposition: Pending progress with therapies, pain control on orals, and medical stability, anticipate discharge to home on POD #3.

## 2019-08-23 NOTE — OR NURSING
Dr. Carrion returned phone call from previous RN caring for the patient.  He was notified that the patient's left lower abdomen incision was opened a small amount.  The incision is not draining currently and does not look inflamed. Dr. Carrion will look at it when he arrives in pre-op.

## 2019-08-23 NOTE — PROGRESS NOTES
PACU to Inpatient Nursing Handoff    Patient Yun Sagastume is a 54 year old female who speaks English.   Procedure Procedure(s):  Anterior Lumbar Interbody Fusion Revision Lumbar 4-5   Surgeon(s) Primary: Jeremiah Carrion MD  Assisting: Becky Grant MD  Resident - Assisting: Ezra Madera PA-C     Allergies   Allergen Reactions     Aspirin      Codeine Nausea     20 years ago     Nsaids      Amoxicillin Rash       Isolation  No active isolations     Past Medical History   has a past medical history of Colon polyp, Depression, Diabetes (H), Gastric bypass status for obesity, HTN (hypertension), Osteopenia, and Post laminectomy syndrome.    Anesthesia General   Dermatome Level     Preop Meds acetaminophen (Tylenol) - time given: 0730  gabapentin (Neurontin) - time given: 0730   Nerve block Not applicable   Intraop Meds dexamethasone (Decadron)  fentanyl (Sublimaze): 350 mcg total  hydromorphone (Dilaudid): 0.5 mg total  ondansetron (Zofran): last given at 1027   Local Meds Yes   Antibiotics cefazolin (Ancef) - last given at 1210     Pain Patient Currently in Pain: yes  Comfort: tolerable with discomfort  Pain Control: inadequate pain control   PACU meds  hydromorphone (Dilaudid): 1 mg (total dose) last given at 1521    PCA / epidural Yes. PCA - hydromorphone (Dilaudid)   Capnography Respiratory Monitoring (EtCO2): 96 mmHg   Telemetry ECG Rhythm: Normal sinus rhythm   Inpatient Telemetry Monitor Ordered? No        Labs Glucose Lab Results   Component Value Date     08/06/2019       Hgb Lab Results   Component Value Date    HGB 8.8 08/23/2019       INR No results found for: INR   PACU Imaging Not applicable     Wound/Incision Incision/Surgical Site 08/30/17 Bilateral Lumbar spine (Active)   Number of days: 723       Incision/Surgical Site 07/29/19 Left Abdomen (Active)   Incision Assessment WDL 8/6/2019 10:16 AM   Irene-Incision Assessment Erythema 8/23/2019  7:36 AM   Closure Other  (Comment) 8/23/2019  7:36 AM   Incision Drainage Amount None 8/23/2019  7:36 AM   Drainage Description Yellow 8/23/2019  7:36 AM   Incision Care Other (Comment) 8/23/2019  7:36 AM   Dressing Intervention Open to air / No Dressing 8/6/2019  1:13 AM   Number of days: 25       Incision/Surgical Site 07/29/19 Back (Active)   Incision Assessment [facility] 8/23/2019  7:36 AM   Irene-Incision Assessment UTV 8/5/2019  5:00 PM   Closure HARI 8/6/2019  1:13 AM   Incision Drainage Amount None 8/6/2019  1:13 AM   Dressing Intervention Clean, dry, intact 8/6/2019 10:16 AM   Number of days: 25       Incision/Surgical Site 08/23/19 Left;Lateral;Anterior Abdomen (Active)   Incision Assessment [facility] 8/23/2019  2:20 PM   Closure Liquid bandage;Sutures 8/23/2019  2:20 PM   Incision Drainage Amount None 8/23/2019  2:20 PM   Dressing Intervention Open to air / No Dressing 8/23/2019  2:20 PM   Number of days: 0      CMS        Equipment ice pack   Other LDA       IV Access Peripheral IV 08/23/19 Right Lower forearm (Active)   Site Assessment [facility] 8/23/2019  2:20 PM   Line Status Infusing 8/23/2019  2:20 PM   Phlebitis Scale 0-->no symptoms 8/23/2019  2:20 PM   Infiltration Scale 0 8/23/2019  2:20 PM   Number of days: 0       Peripheral IV 08/23/19 Left Hand (Active)   Site Assessment [facility] 8/23/2019  2:20 PM   Line Status Saline locked 8/23/2019  2:20 PM   Phlebitis Scale 0-->no symptoms 8/23/2019  2:20 PM   Infiltration Scale 0 8/23/2019  2:20 PM   Number of days: 0      Blood Products Not applicable EBL 50   mL   Intake/Output Date 08/23/19 0700 - 08/24/19 0659   Shift 0531-4864 1292-9858 8668-2221 24 Hour Total   INTAKE   I.V. 1400   1400   Shift Total(mL/kg) 1400(15.8)   1400(15.8)   OUTPUT   Urine 700   700   Blood 50   50   Shift Total(mL/kg) 750(8.47)   750(8.47)   Weight (kg) 88.6 88.6 88.6 88.6      Drains / Schuster Urethral Catheter Straight-tip 16 fr (Active)   Number of days: 0      Time of void PreOp Void Prior to Procedure: 0700 (08/23/19  0749)    PostOp      Diapered? No   Bladder Scan     PO    tolerating sips     Vitals    B/P: (!) 141/81  T: 99.1  F (37.3  C)    Temp src: Axillary  P:  Pulse: 79 (08/23/19 1515)    Heart Rate: 79 (08/23/19 1515)     R: 11  O2:  SpO2: 97 %    O2 Device: Nasal cannula (08/23/19 1515)    Oxygen Delivery: 2 LPM (08/23/19 1515)         Family/support present daughter   Patient belongings     Patient transported on bed   DC meds/scripts (obs/outpt) Not applicable   Inpatient Pain Meds Released? Yes       Special needs/considerations None   Tasks needing completion None       Francisca Prado RN  [facility] [id number]

## 2019-08-23 NOTE — CONSULTS
Consult Date:  08/23/2019      REQUESTING PHYSICIAN:  Jeremiah Carrion MD      REASON FOR CONSULTATION:  Postoperative medical management.      HISTORY OF PRESENT ILLNESS:  The patient is a 54-year-old female admitted following revision of lumbar fusion by Dr. Carrion.  Estimated blood loss was 50 mL.  The patient has a history of diet-controlled diabetes, hypertension, GERD, Buster-en-Y gastric bypass, hypertension, vitamin D deficiency, anxiety and depression.  She was seen in the PACU.  She is doing well, denies any nausea or vomiting.  Pain is under control.  She does not have any chest pain or shortness of breath.      The patient denies any prior history of major heart or lung disease.      PAST MEDICAL HISTORY:   1.  Recent lumbar fusion on 07/30/2019.   2.  Hypertension.   3.  Diabetes, diet-controlled.   4.  Vitamin D deficiency.   5.  Osteoporosis.   6.  Buster-en-Y gastric bypass.   7.  History of hyponatremia due to hypovolemia, which resolved in the postop period.   8.  Anxiety and depression.      PRIOR TO ADMISSION MEDICATIONS:   1.  Tylenol 1000 mg every 6 hours as needed for pain.   2.  Flexeril 10 mg twice a day as needed for muscle spasm.     3.  Vistaril 25 mg p.o. twice daily p.r.n. for itching, anxiety.   4.  Lisinopril/hydrochlorothiazide 10/12.5 mg tablet daily.   5.  Paxil 60 mg p.o. q. day.   6.  MiraLax 1 packet twice a day,   7.  Lyrica 25 mg 3 times a day.   8.  Zantac 150 mg twice a day p.r.n.   9.  Senokot S 1 tablet twice a day.   10.  Vitamin D 50,000 units weekly.   11.  Oxycodone 10-15 mg every 3 hours as needed for moderate pain.   12.  Oxycodone 10 mg every 4 hours as needed for pain.      ALLERGIES:  MULTIPLE MEDICATION ALLERGIES INCLUDING ASPIRIN, CODEINE, NSAIDs and AMOXICILLIN.      FAMILY HISTORY:  Not contributory for current underlying illness of back surgery.  There is no history of DVT in the family.      SOCIAL HISTORY:  She quit smoking 15 years ago.  No alcohol abuse.       REVIEW OF SYSTEMS:  Ten-point review of system was obtained and noted in the HPI, otherwise negative.      PHYSICAL EXAMINATION:   GENERAL:  The patient is awake, alert, oriented, follows commands.   VITAL SIGNS:  Temperature 99.1, pulse 79, blood pressure 141/81, respiratory rate 11, 97% on 2 liters by nasal cannula.   HEENT:  Normocephalic.  Did appreciate evidence of trauma.  Pupils are equal and respond light.  Oropharynx is dry.   NECK:  Supple.     LUNGS:  Air entry is good on both sides, no wheezing or crackles.   CARDIOVASCULAR:  S1, S2 normal.  Did not appreciate any murmurs or gallops.   ABDOMEN:  Soft, nontender.  Left lower quadrant incision noted.  She is slightly tender around that area.   EXTREMITIES:  Bilateral lower extremities with no calf tenderness, no edema.  She is able to wiggle her toes and move her ankle up and down.   SKIN:  Unremarkable.  She has a surgical wound in the left lower quadrant region and the back.        INVESTIGATIONS:  Chemistry panel:  Potassium 3.5, creatinine 0.9.  CBC:  Hemoglobin 8.8; a couple of weeks ago, hemoglobin was 8.6.      ASSESSMENT AND PLAN:   1.  Status post revision of lumbar fusion.  Plan per Orthopedic Surgery.  Last time she had hypovolemia leading to hyponatremia, watch for that.  Would chose normal saline for any fluid replacement if needed.   2.  Hypertension.  Hold lisinopril/hydrochlorothiazide in the postop period.   3.  Diabetes.  Prior to admission it is diet-controlled.  Would consider starting her on sliding scale insulin as patient received some Decadron and her blood sugars are likely going to run high.     4.  Vitamin D deficiency and osteoporosis.  She is on 50,000 units of vitamin D weekly.     5.  Buster-en-Y gastric bypass.  Avoid NSAIDs.  She was on PPI before but that was stopped some time ago.  She has been using over-the-counter Zantac which seems to work.  We will schedule that every day here.  She uses it every other day at  home.   6.  Postoperative hyponatremia.  The last time she was here, she was hyponatremic thought to be due to low volume in the postop period.  It resolved after fluid resuscitation, it looks like.  Monitor for any hyponatremia at this time.  Hold lisinopril/hydrochlorothiazide in the postop period   7.  History of anxiety, depression.  Continue Paxil as before.   8.  Deep venous thrombosis prophylaxis per Orthopedic Surgery.  Recommended her to do ankle pumps and drink plenty of fluids.      DISPOSITION:  Deferred to primary team.         MONICA CHAPMAN MD             D: 2019   T: 2019   MT: MELISSA      Name:     MAGALYS HODGES   MRN:      [id number]        Account:       [id number]   :      1965           Consult Date:  2019      Document: [id number]       cc: Jeremiah Carrion MD

## 2019-08-23 NOTE — OP NOTE
DATE OF OPERATION: August 23, 2019      CO-SURGEONS:  Jeremiah Carrion MD; Becky Grant MD.       ASSISTANT: Rod Gar PA-C      ANESTHESIA:  General endotracheal anesthesia.       PREOPERATIVE DIAGNOSIS:  L4-L5 Cage Dislodgement      POSTOPERATIVE DIAGNOSIS: L4-L5 Cage Dislodgement      PROCEDURE:     1.  Re-op L4-L5 oblique lateral interbody fusion (OLIF)  2. Left Ureterolysis      ESTIMATED BLOOD LOSS:  50 mL.       COMPLICATIONS:  None apparent.       INDICATIONS:  This 54 year old female underwent L5-S1 and L4- L5 OLIF on July 29.  Approximately 1 week later she was found to have dislodgment of the L4-L5 cage.  Reoperative OLIF now at nearly 4 weeks was recommended.      DESCRIPTION OF PROCEDURE:  The patient was brought to the operating room, placed in the supine position.  After monitors were placed general anesthesia was achieved.  The patient was placed in a right lateral decubitus position. The previous OLIF incision was incised and extended slightly cephalad.  The L4-L5 cage was encountered after significant dissection requiring an extra hour of time of dissection.  The middle and right screw holes were noted however the ureter on the left and the left common iliac artery were significantly stuck.  This took additional time to dissect the artery free from the surrounding structures and I looped it with an umbilical tape.  The ureter was freed up and lysed from the surrounding scar above the left common iliac artery and distally toward the iliac bifurcation.  It was scarred and thus it could not be retracted significantly however we were still able to remove the disc. The disk and fusion portion as well as the instrumentation for the L4-L5 disk space will then be dictated by Dr. Carrion.   The retractors were released and hemostasis appeared adequate. The area was again inspected for hemostasis and when this was assured, the external oblique and anterior rectus fascia were was closed with looped #2 PDS.   Interrupted 3-0 Vicryl sutures were then used for deep dermal followed by 4-0 subcuticular Monocryl followed by skin glue.  Prior to closure, 30 mL of 0.25% Marcaine  With epinephrine was injected into the incision. The patient had a palpable left DP pulse and appeared to have tolerated the procedure well without immediate complication.  I was present throughout my entire portion of the procedure.  The patient's urine remained clear during the procedure.         Becky Grant MD, FACS, RPVI  Professor and Chief, Vascular and Endovascular Surgery  [facility]  Cell: [phone]  [email or web address]

## 2019-08-24 ENCOUNTER — APPOINTMENT (OUTPATIENT)
Dept: PHYSICAL THERAPY | Facility: CLINIC | Age: 54
End: 2019-08-24
Attending: PHYSICIAN ASSISTANT
Payer: COMMERCIAL

## 2019-08-24 LAB
GLUCOSE BLDC GLUCOMTR-MCNC: 120 MG/DL (ref 70–99)
GLUCOSE BLDC GLUCOMTR-MCNC: 127 MG/DL (ref 70–99)
GLUCOSE BLDC GLUCOMTR-MCNC: 135 MG/DL (ref 70–99)
GLUCOSE BLDC GLUCOMTR-MCNC: 145 MG/DL (ref 70–99)
GLUCOSE BLDC GLUCOMTR-MCNC: 150 MG/DL (ref 70–99)
HGB BLD-MCNC: 9 G/DL (ref 11.7–15.7)

## 2019-08-24 PROCEDURE — 97530 THERAPEUTIC ACTIVITIES: CPT | Mod: GP | Performed by: PHYSICAL THERAPIST

## 2019-08-24 PROCEDURE — 36415 COLL VENOUS BLD VENIPUNCTURE: CPT | Performed by: PHYSICIAN ASSISTANT

## 2019-08-24 PROCEDURE — 85018 HEMOGLOBIN: CPT | Performed by: PHYSICIAN ASSISTANT

## 2019-08-24 PROCEDURE — 25000128 H RX IP 250 OP 636: Performed by: STUDENT IN AN ORGANIZED HEALTH CARE EDUCATION/TRAINING PROGRAM

## 2019-08-24 PROCEDURE — 25000132 ZZH RX MED GY IP 250 OP 250 PS 637: Performed by: HOSPITALIST

## 2019-08-24 PROCEDURE — 25000132 ZZH RX MED GY IP 250 OP 250 PS 637: Performed by: PHYSICIAN ASSISTANT

## 2019-08-24 PROCEDURE — 97161 PT EVAL LOW COMPLEX 20 MIN: CPT | Mod: GP | Performed by: PHYSICAL THERAPIST

## 2019-08-24 PROCEDURE — 25000128 H RX IP 250 OP 636: Performed by: PHYSICIAN ASSISTANT

## 2019-08-24 PROCEDURE — 25000132 ZZH RX MED GY IP 250 OP 250 PS 637: Performed by: STUDENT IN AN ORGANIZED HEALTH CARE EDUCATION/TRAINING PROGRAM

## 2019-08-24 PROCEDURE — 00000146 ZZHCL STATISTIC GLUCOSE BY METER IP

## 2019-08-24 PROCEDURE — 12000001 ZZH R&B MED SURG/OB UMMC

## 2019-08-24 PROCEDURE — 99232 SBSQ HOSP IP/OBS MODERATE 35: CPT | Performed by: HOSPITALIST

## 2019-08-24 RX ORDER — HYDROMORPHONE HYDROCHLORIDE 1 MG/ML
0.3 INJECTION, SOLUTION INTRAMUSCULAR; INTRAVENOUS; SUBCUTANEOUS ONCE
Status: COMPLETED | OUTPATIENT
Start: 2019-08-24 | End: 2019-08-24

## 2019-08-24 RX ORDER — ACETAMINOPHEN 500 MG
1000 TABLET ORAL EVERY 6 HOURS
Status: DISCONTINUED | OUTPATIENT
Start: 2019-08-24 | End: 2019-08-26 | Stop reason: HOSPADM

## 2019-08-24 RX ORDER — HYDROMORPHONE HYDROCHLORIDE 1 MG/ML
.3-.5 INJECTION, SOLUTION INTRAMUSCULAR; INTRAVENOUS; SUBCUTANEOUS
Status: DISCONTINUED | OUTPATIENT
Start: 2019-08-24 | End: 2019-08-26 | Stop reason: HOSPADM

## 2019-08-24 RX ORDER — OXYCODONE HYDROCHLORIDE 5 MG/1
5-10 TABLET ORAL EVERY 4 HOURS PRN
Status: DISCONTINUED | OUTPATIENT
Start: 2019-08-24 | End: 2019-08-26 | Stop reason: HOSPADM

## 2019-08-24 RX ORDER — AMOXICILLIN 250 MG
2 CAPSULE ORAL 2 TIMES DAILY
Status: DISCONTINUED | OUTPATIENT
Start: 2019-08-24 | End: 2019-08-26 | Stop reason: HOSPADM

## 2019-08-24 RX ORDER — BISACODYL 10 MG
10 SUPPOSITORY, RECTAL RECTAL DAILY PRN
Status: DISCONTINUED | OUTPATIENT
Start: 2019-08-24 | End: 2019-08-26 | Stop reason: HOSPADM

## 2019-08-24 RX ADMIN — RANITIDINE 150 MG: 150 TABLET ORAL at 09:55

## 2019-08-24 RX ADMIN — ACETAMINOPHEN 1000 MG: 500 TABLET ORAL at 18:17

## 2019-08-24 RX ADMIN — RANITIDINE 150 MG: 150 TABLET ORAL at 21:55

## 2019-08-24 RX ADMIN — OXYCODONE HYDROCHLORIDE 10 MG: 5 TABLET ORAL at 18:17

## 2019-08-24 RX ADMIN — ACETAMINOPHEN 1000 MG: 500 TABLET ORAL at 12:46

## 2019-08-24 RX ADMIN — HYDROMORPHONE HYDROCHLORIDE 0.3 MG: 1 INJECTION, SOLUTION INTRAMUSCULAR; INTRAVENOUS; SUBCUTANEOUS at 06:40

## 2019-08-24 RX ADMIN — OXYCODONE HYDROCHLORIDE 10 MG: 5 TABLET ORAL at 22:07

## 2019-08-24 RX ADMIN — PREGABALIN 25 MG: 25 CAPSULE ORAL at 12:46

## 2019-08-24 RX ADMIN — CEFAZOLIN 1 G: 1 INJECTION, POWDER, FOR SOLUTION INTRAMUSCULAR; INTRAVENOUS at 09:56

## 2019-08-24 RX ADMIN — PREGABALIN 25 MG: 25 CAPSULE ORAL at 09:55

## 2019-08-24 RX ADMIN — PREGABALIN 25 MG: 25 CAPSULE ORAL at 21:55

## 2019-08-24 RX ADMIN — MAGNESIUM HYDROXIDE 30 ML: 400 SUSPENSION ORAL at 12:47

## 2019-08-24 RX ADMIN — OXYCODONE HYDROCHLORIDE 10 MG: 5 TABLET ORAL at 12:46

## 2019-08-24 RX ADMIN — POLYETHYLENE GLYCOL 3350 17 G: 17 POWDER, FOR SOLUTION ORAL at 21:55

## 2019-08-24 RX ADMIN — HYDROMORPHONE HYDROCHLORIDE 0.5 MG: 1 INJECTION, SOLUTION INTRAMUSCULAR; INTRAVENOUS; SUBCUTANEOUS at 17:22

## 2019-08-24 RX ADMIN — POLYETHYLENE GLYCOL 3350 17 G: 17 POWDER, FOR SOLUTION ORAL at 09:55

## 2019-08-24 RX ADMIN — SENNOSIDES AND DOCUSATE SODIUM 2 TABLET: 8.6; 5 TABLET ORAL at 12:46

## 2019-08-24 RX ADMIN — PAROXETINE 60 MG: 20 TABLET, FILM COATED ORAL at 09:55

## 2019-08-24 RX ADMIN — CEFAZOLIN 1 G: 1 INJECTION, POWDER, FOR SOLUTION INTRAMUSCULAR; INTRAVENOUS at 01:59

## 2019-08-24 RX ADMIN — SENNOSIDES AND DOCUSATE SODIUM 2 TABLET: 8.6; 5 TABLET ORAL at 21:55

## 2019-08-24 ASSESSMENT — ACTIVITIES OF DAILY LIVING (ADL)
ADLS_ACUITY_SCORE: 14
ADLS_ACUITY_SCORE: 15
ADLS_ACUITY_SCORE: 14
ADLS_ACUITY_SCORE: 14
ADLS_ACUITY_SCORE: 15
ADLS_ACUITY_SCORE: 15

## 2019-08-24 ASSESSMENT — PAIN DESCRIPTION - DESCRIPTORS: DESCRIPTORS: ACHING

## 2019-08-24 NOTE — DISCHARGE SUMMARY
ORTHOPAEDIC SURGERY DISCHARGE SUMMARY     Date of Admission: 8/23/2019  Date of Discharge: 8/26/2019  Disposition: Home  Staff Physician: Jeremiah Carrion*  Primary Care Provider: Mary Ellen Salgado      ADMISSION DIAGNOSIS:  Cage Dislogdment    DISCHARGE DIAGNOSIS:  Cage Dislogdment    PROCEDURES: Procedure(s):  Anterior Lumbar Interbody Fusion Revision Lumbar 4-5 on 8/23/2019    BRIEF HISTORY:  Yun Sagastume is a 54 year old female with PMH including HTN, Depression, osteopenia, s/p gastric bypass, Vit D defic, abnormal uterine bleeding, anemia, balance problem, cervical paraspinal spasm, atypical chest pain, broken controlled substance agreement, esoph reflux, hyper reflexia, post laminectomy syndrome, Type II DM s/p anterior interbody fusion L4-5 and L5-S1; TLIF L3-4; and posterior instrumented spinal fusion L3-pelvis on 7/29/2019.  Unfortunately, lumbar CT postoperatively demonstrated anterior displacement of the L4-5 cage.  Discussed concern with the patient that this would need to be revised earlier rather than later prior to scarring taking place.  The risks, benefits, and alternatives of surgery were discussed.  The patient did wish to proceed with surgery.    HOSPITAL COURSE:    The patient was admitted following the above listed procedures for pain control and rehabilitation. Yun Sagastume did well post-operatively. Medicine was consulted post operatively to aid in management of medical co-morbidities. The patient received routine nursing cares and at the time of discharge was medically stable. Vital signs were stable throughout admission. The patient is tolerating a regular diet and is voiding spontaneously. All PT/OT goals have been met for safe mobility. Pain is now controlled on oral medications which will be available on discharge. Stool softeners have been used while taking pain medications to help prevent constipation. Yun Sagastume is deemed medically safe to discharge on POD3.      Antibiotics:  Ancef given periop and 24 hours postop.   DVT prophylaxis:  Mechanical initiated after surgery.  PT Progress:  Has met PT/OT goals for safe mobility.    Pain Meds:  Weaned off all IV pain meds by discharge.  Inpatient Events: No significant events or complications.     PHYSICAL EXAM:    Gen: NAD. Resting comfortably in bed  Resp: Breathing comfortably on RA  Drain: None     Musculoskeletal: Primapore posterior dressing CDI, dermabond anterior incision.     Lower extremities:  Motor Strength Right Left   Hip flexion: L1, L2, L3 5/5 5/5   Hip adduction: L2, L3 5/5 5/5   Knee flexion: S1 4/5 4/5   Knee extension: L3, L4 4/5 4/5   Ankle dosiflexion: L4, L5 5/5 5/5   EHL: L5 5/5 5/5   Ankle plantarflexion: S1 5/5 5/5      Sensation from L1-S2 is preserved.      FOLLOWUP:    Follow up with Dr. Carrion at 6 weeks postoperatively.    Future Appointments   Date Time Provider Department Center   8/24/2019  9:00 AM Tabby Barakat Pt, PT [facility] [place]   8/24/2019  1:00 PM Tabby Barakat Pt, PT [facility] [place] [facility]   8/24/2019  2:30 PM Maria A Overton, OT [facility] [place]   10/3/2019  3:00 PM Jeremiah Carrion MD [facility]   11/14/2019 10:30 AM Jeremiah Carrion MD [facility]       Orthopaedic Surgery appointments are at the [facility] ([address] [place] [address]). Call [phone] to schedule a follow-up appointment at this location with your provider.     PLANNED DISCHARGE ORDERS:     DVT Prophylaxis: None     Activity: WBAT. Up with assist until independent. No excessive bending or twisting. No lifting >10 lbs x 6 weeks.      Wound Care: see Below      Current Discharge Medication List      CONTINUE these medications which have CHANGED    Details   acetaminophen (TYLENOL) 500 MG tablet Take 2 tablets (1,000 mg) by mouth every 6 hours  Qty: 1 Bottle, Refills: 0    Associated Diagnoses: Status post spinal surgery      oxyCODONE (ROXICODONE) 5 MG  tablet Take 1-2 tablets (5-10 mg) by mouth every 4 hours as needed for moderate to severe pain Note change in tablet size.  Qty: 30 tablet, Refills: 0    Associated Diagnoses: Status post spinal surgery         CONTINUE these medications which have NOT CHANGED    Details   cyclobenzaprine (FLEXERIL) 10 MG tablet Take 10 mg by mouth 2 times daily as needed       hydrOXYzine (VISTARIL) 25 MG capsule Take 25 mg by mouth 2 times daily as needed       PARoxetine (PAXIL) 40 MG tablet Take 60 mg by mouth every morning       pregabalin (LYRICA) 25 MG capsule Take 1 capsule (25 mg) by mouth 3 times daily  Qty: 180 capsule, Refills: 0    Associated Diagnoses: S/P spinal surgery      ranitidine (ZANTAC) 150 MG tablet Take 150 mg by mouth 2 times daily as needed for heartburn      senna-docusate (SENOKOT-S/PERICOLACE) 8.6-50 MG tablet Take 1 tablet by mouth 2 times daily  Qty: 40 tablet, Refills: 0    Associated Diagnoses: S/P spinal surgery      lisinopril-hydrochlorothiazide (PRINZIDE/ZESTORETIC) 10-12.5 MG per tablet Take 1 tablet by mouth daily      polyethylene glycol (MIRALAX/GLYCOLAX) packet Take 17 g by mouth 2 times daily  Qty: 255 g, Refills: 0    Associated Diagnoses: S/P spinal surgery      vitamin D2 (ERGOCALCIFEROL) 98986 units (1250 mcg) capsule Take 1 capsule (50,000 Units) by mouth once a week  Qty: 12 capsule, Refills: 1    Associated Diagnoses: Low bone density         STOP taking these medications       methylPREDNISolone (MEDROL DOSEPAK) 4 MG tablet therapy pack Comments:   Reason for Stopping:                 Discharge Procedure Orders   Reason for your hospital stay   Order Comments: Lower back surgery.     Adult [facility] Follow-up and recommended labs and tests   Order Comments: Follow up with Dr. Carrion in 6 weeks.    Appointments on [place] and/or [facility] (with [facility] or [facility] provider or service). Call [phone] if you haven't heard regarding these appointments within 7 days of discharge.  "    Activity   Order Comments: WBAT. Up with assist until independent. No excessive bending or twisting. No lifting >10 lbs x 6 weeks. No Shelia lift for transfers.     Order Specific Question Answer Comments   Is discharge order? Yes      Discharge Instructions   Order Comments: Postoperative Instructions                                                                        Dr. SYDNIE Carrion  [address] [address]                 You have had a surgery involving your thoracic and lumbar spine. You have a dressing called Aquacel on your incision which can be worn for up to 10 days-14, and it can be worn in the shower. After the Aquacel has been removed, you do not need a dressing. Often times there is \"surgical glue\" directly over the incision. This will fall off on its own, which can take up to 2 weeks. It is okay to shower and wash gently with soap and water. Do not soak in the bath. No pools, hot tubs, or lakes for 6 weeks.      For postoperative pain control, I have prescribed a narcotic medication.  This should be taken for the first few weeks following surgery, but as soon as you are able, transition to an over-the-counter type medication such as Tylenol.  While taking the narcotic medication, there are several precautions that you must adhere to. These medications have numerous side effects including nausea, constipation, and drowsiness. If you experience nausea, this may be relieved by taking the medication with food or a light meal. To avoid constipation, please use an over-the-counter stool softener or drink lots of water and eat fruits and vegetables. Avoid operating heavy machinery or driving an automobile while on narcotic medications.       You will be seen in the clinic at 6 weeks following surgery. You will not need to attend physical therapy during this time. You can focus on " cardiovascular fitness by walking as much as you can tolerate. Avoid bending, lifting, and twisting. Your weight lifting restriction is 10 pounds until your first follow-up appointment.       When you get home, you may resume your normal diet as tolerated. You may not be very hungry but try to eat small healthy meals to help you heal. Remember to drink plenty of water/fluids to help keep you hydrated.     After surgery, you may have a sensitive scar.  When the dressing has been removed, you may massage the scar to decrease sensitivity and help break down scar tissue. Do this up to 4 times per day.     Please call or return if you experience the following:  Fevers (temperature greater than 100.4 degrees Fahrenheit)  Pain that is getting worse or does not respond to pain medications  Drainage from your wound  Increasing redness about the wound  Any other worrisome symptoms     You may reach the clinic by dialing [phone].  After hours, you may reach the resident on call by dialing [phone].     Diet   Order Comments: Return to pre-surgery adult regular diet.     Order Specific Question Answer Comments   Is discharge order? Yes        Chava Moss MD  Orthopaedic Surgery PGY-1  [phone]

## 2019-08-24 NOTE — PLAN OF CARE
Attempted to see pt for PM PT session twice, pt refused therapy. PT session canceled, will continued to see per plan of care.

## 2019-08-24 NOTE — PROGRESS NOTES
"Orthopaedic Surgery Progress Note:  08/24/2019     Subjective:   POD1    No acute events overnight. Pain not well controlled with PCA pump- patient reports minimal usage overnight. Encouraged to use in the post operative period for pain management. No other concerns tonight. Denies chest pain, shortness of breath.     Objective:   /79   Pulse 79   Temp 98.3  F (36.8  C) (Oral)   Resp 10   Ht 1.727 m (5' 8\")   Wt 88.6 kg (195 lb 5.2 oz)   SpO2 (!) 88%   BMI 29.70 kg/m    No intake/output data recorded.  Gen: NAD. Resting comfortably in bed  Resp: Breathing comfortably on RA  Drain: None    Musculoskeletal:     Lower extremities:  Motor Strength Right Left   Hip flexion: L1, L2, L3 5/5 5/5   Hip adduction: L2, L3 5/5 5/5   Knee flexion: S1 4/5 5/5   Knee extension: L3, L4 5/5 5/5   Ankle dosiflexion: L4, L5 5/5 5/5   EHL: L5 5/5 5/5   Ankle plantarflexion: S1 5/5 5/5     Sensation from L1-S2 is preserved.    Labs:  Lab Results   Component Value Date    WBC 8.1 07/30/2019    HGB 8.8 (L) 08/23/2019     07/30/2019       Anaerobic bacterial culture - pending   Fluid Culture Aerobic Bacterial - pending        Assessment & Plan:   Yun Sagastume is a 54 year old female now s/p L4-5 ALIF revision on 8/23/2019 with Dr. Carrion and Rudy.     Ortho Primary  Activity: Up with assist until independent. No excessive bending or twisting. No lifting >10 lbs x 6 weeks. No Shelia lift for transfers.   Weight bearing status: WBAT.  Pain management: Transition from IV to PO as tolerated. No NSAIDs   Antibiotics: Ancef 1 gm q 8 hours x 24 hours.  Diet: Begin with clear fluids and progress diet as tolerated.   DVT prophylaxis: SCDs only. No chemical DVT ppx needed.  Imaging: XR Upright  PTDC - ordered.  Labs: labs PRN  Bracing/Splinting: None.  Dressings: left lateral abdominal incision Dermabond.  Drains: None.  Schuster catheter: Remove POD#1.   Physical Therapy/Occupational Therapy: Eval and treat.  Cultures: " Pending, follow culture results closely.    Consults: Hospitalist.  Follow-up: Clinic with Dr. Carrion in 6 weeks with repeat x-rays.   Disposition: Pending progress with therapies, pain control on orals, and medical stability, anticipate discharge to [facility] on POD #3.    Orthopaedics surgery staff for this patient is Dr. Carrion.    ------------------------------------------------------------------------------------------    [   ] Discontinue PCA  [   ] Drains removed.  [   ] Post xrays done.  [   ] Discharge orders done.  [   ] Discharge summary started.    Chava Moss MD  Orthopaedic Surgery PGY-1  [phone]    FOLLOWUP:    Future Appointments   Date Time Provider Department Center   8/24/2019  9:00 AM Tabby Barakat Pt, PT [facility] [place]   8/24/2019  1:00 PM Tabby Barakat Pt, PT [facility] [place]   8/24/2019  2:30 PM Maria A Overton, OT [facility] [place]   10/3/2019  3:00 PM Jeremiah Carrion MD [facility]   11/14/2019 10:30 AM Jeremiah Carrion MD [facility]

## 2019-08-24 NOTE — PLAN OF CARE
VS: VSS.  A & O x 4.   O2: Pt was on 3L of O2. Weaned to 1L of O2. When pt was on RA, pt would desat to 88%. O2 sats >90% on 1L of O2.  Lung sounds clear. Denies SOB.    Output: Schuster catheter patent and intact. Draining adequate amounts.   Last BM: LBM 8/21/19. Bowel sounds active. Scheduled senna given.   Activity: Pt dangled at bedside w/ A1.   Skin: Scar to back from previous surgery, multiple scabs healing on scar. L hip incision.   Pain: Pain managed w/ PCA dilaudid pump.  Pt reporting increased pain in AM w/ ortho rounding. Ortho ordered 1 time dose of IV diluadid. IV dilaudid given w/ relief.    CMS: Intact. Pt reports baseline numbness to bilateral thighs following her previous surgery.   Dressing: L hip incision liquid bandages CDI, no drainage noted.   Diet: Pt tolerated clear liquid diet well, denied any nausea. Advanced to regular diet. High CHO diet, 4-7 units per meal. 2000 - 2400 adriana.   LDA: R PIV infusing at 20 mL/hr for PCA pump.  L PIV SL.   Equipment: IV pump and pole  PCA pump  FWW   Plan: TBD.   Additional Info: BG's 146 and 120  Call light within reach, able to make needs known.

## 2019-08-24 NOTE — PROGRESS NOTES
"VASCULAR SURGERY PROGRESS NOTE    Subjective:  Tolerated surgery well yesterday. Continues to have some pain despite PCA, says she is \"hurting all over.\" Nursing to administer PRN medication. No n/v, tolerating liquids.    Objective:    Intake/Output Summary (Last 24 hours) at 8/24/2019 0648  Last data filed at 8/23/2019 2256  Gross per 24 hour   Intake 1525 ml   Output 1540 ml   Net -15 ml     Labs:  ROUTINE IP LABS (Last four results)  BMP  Recent Labs   Lab 08/23/19  0750   POTASSIUM 3.5   CR 0.94     CBC  Recent Labs   Lab 08/24/19  0536 08/23/19  0750   HGB 9.0* 8.8*     INRNo lab results found in last 7 days.  PHYSICAL EXAM:  /79   Pulse 79   Temp 98.3  F (36.8  C) (Oral)   Resp 10   Ht 1.727 m (5' 8\")   Wt 88.6 kg (195 lb 5.2 oz)   SpO2 (!) 88%   BMI 29.70 kg/m    General: The patient is alert and oriented. Appropriate. No acute distress  Psych: pleasant affect, answers questions appropriately  Skin: Color appropriate for race, warm, dry.  Respiratory: The patient does not  require supplemental oxygen. Breathing unlabored  GI:  Abdomen soft, nontender to light palpation; Left oblique incision clean, dry, intact, with skin glue  Extremities: BLE pulse palpable,  no edema noted.        Imaging:   Recent Results (from the past 24 hour(s))   XR Surgery LION Fluoro L/T 5 Min    Narrative    This exam was marked as non-reportable because it will not be read by a   radiologist or a [place] non-radiologist provider.                       ASSESSMENT:  54F w/ L4-5 cage dislodgement s/p L4-5 OLIF revision and left ureterolysis POD1      PLAN:  UOP adequate  Continue PCA  Incision healthy  Diet and activity per primary team  Will defer further management per orthopedic surgery      Atul Mujica MD   Vascular Surgery Fellow  [facility]                "

## 2019-08-24 NOTE — PROGRESS NOTES
08/24/19 0851   Quick Adds   Type of Visit Initial PT Evaluation       Present no   Language english   Living Environment   Lives With child(salina), adult   Living Arrangements house   Home Accessibility stairs to enter home  (stairs within home to basement, pt does not need to use)   Number of Stairs, Main Entrance 3  (small rise steps)   Stair Railings, Main Entrance railings on both sides of stairs   Transportation Anticipated family or friend will provide   Self-Care   Usual Activity Tolerance fair  (d/t pain beforehand, prior back surgery)   Current Activity Tolerance poor   Regular Exercise No   Equipment Currently Used at Home walker, rolling  (also has four wheel walker at home)   Functional Level Prior   Ambulation 0-->independent   Transferring 0-->independent   Toileting 0-->independent   Bathing 0-->independent   Communication 0-->understands/communicates without difficulty   Swallowing 0-->swallows foods/liquids without difficulty   Cognition 0 - no cognition issues reported   Fall history within last six months no   Which of the above functional risks had a recent onset or change? ambulation;transferring   General Information   Onset of Illness/Injury or Date of Surgery - Date 08/23/19   Referring Physician    Patient/Family Goals Statement Be able to walk again   Pertinent History of Current Problem (include personal factors and/or comorbidities that impact the POC) Pt is 54 y.o. female s/p L4/5 ALIF revision. Pertinent comorbidities include HTN, DM, previous back surgery; see chart for PMH for more details.   Precautions/Limitations fall precautions;spinal precautions   Weight-Bearing Status - LUE weight-bearing as tolerated  (Pt educated to lift no more than 5-10 lbs)   Weight-Bearing Status - RUE weight-bearing as tolerated   Weight-Bearing Status - LLE full weight-bearing   Weight-Bearing Status - RLE full weight-bearing   Heart Disease Risk Factors Diabetes;High  blood pressure;Lack of physical activity;Overweight   General Observations Pt supine in bed at beginning of PT evaluation, with pt on room air, with capnography, IV fluids, PCA, jamison catheter, pulse oximetry, PCDs.   Cognitive Status Examination   Orientation   (not tested)   Level of Consciousness alert   Follows Commands and Answers Questions 100% of the time;able to follow multistep instructions   Personal Safety and Judgment intact   Memory intact   Pain Assessment   Patient Currently in Pain Yes, see Vital Sign flowsheet  (pt reports not well managed, didnt understand PCA)   Integumentary/Edema   Integumentary/Edema Comments no drain   Posture    Posture Kyphosis   Range of Motion (ROM)   ROM Comment Not formally tested, pt demonstrated functional ROM for bed mobility, sit to stand transfers and gait.   Strength   Strength Comments Not formally tested. Pt demonstrated functional LE strength for bed mobility, transfers and gait.    Bed Mobility   Bed Mobility Comments Pt demonstrated log rolling technique in supine with HOB elevated with SBA x1. Pt required use of bed rail for assist. Supine to sit SBA x 1 with HOB elevated. Sit to supine with min A x 1 (assistance required to lift legs onto bed).    Transfer Skills   Transfer Comments Sit to stand at FWW with min A x 1.  Stand at FWW to sit at EOB with CGA  x 1.    Gait   Gait Comments Pt amb from EOB to door and back with FWW and CGA x 1 (15').   Balance   Balance Comments Pt reported feeling steady on feet, with no complaints of dizziness.    Sensory Examination   Sensory Perception Comments Pt reported numbness in bilateral thighs s/p first L4-5 ALIF/PLIF and continuing to present. Additional lack of sensation reported in buttock region.   General Therapy Interventions   Planned Therapy Interventions bed mobility training;gait training;strengthening;transfer training;home program guidelines   Clinical Impression   Criteria for Skilled Therapeutic  "Intervention yes, treatment indicated   PT Diagnosis decreased LE strength bilaterally, impaired functional mobility.   Influenced by the following impairments high pain levels, s/p L4-5 ALIF revision, LE weakness.   Functional limitations due to impairments decreased ability to perform independent bed mobility, transfers, gait and stairs.   Clinical Presentation Stable/Uncomplicated   Clinical Presentation Rationale Pt is 54 y.o. female s/p L4-5 ALIF revision. Pt was independent with all activities at baseline and prior to revision surgery.   Clinical Decision Making (Complexity) Low complexity   Therapy Frequency 2x/day   Predicted Duration of Therapy Intervention (days/wks) 5 days   Anticipated Equipment Needs at Discharge   (none. Pt has 2 walkers at home.)   Anticipated Discharge Disposition Home with Assist   Risk & Benefits of therapy have been explained Yes   Patient, Family & other staff in agreement with plan of care Yes   [facility] AM-PAC  \"6 Clicks\" V.2 Basic Mobility Inpatient Short Form   1. Turning from your back to your side while in a flat bed without using bedrails? 2 - A Lot   2. Moving from lying on your back to sitting on the side of a flat bed without using bedrails? 3 - A Little   3. Moving to and from a bed to a chair (including a wheelchair)? 3 - A Little   4. Standing up from a chair using your arms (e.g., wheelchair, or bedside chair)? 3 - A Little   5. To walk in hospital room? 3 - A Little   6. Climbing 3-5 steps with a railing? 2 - A Lot   Basic Mobility Raw Score (Score out of 24.Lower scores equate to lower levels of function) 16   Total Evaluation Time   Total Evaluation Time (Minutes) 9     "

## 2019-08-24 NOTE — PLAN OF CARE
Discharge Planner PT   Patient plan for discharge: home with assist  Current status: Log rolling in supine with HOB elevated with SBA x 1 and verbal cues to bend knees/grab bed rail to assist rolling into sidelying. Supine with HOB elevated to sit with SBA x 1. Sit to stand at FWW with min A x 1. Pt amb for 15' with FWW and CGA x 1 within room. Stand at FWW to sit at EOB CGA x 1. Sit to supine (using logrolling technique) with min A x 1; pt required assist in lifting legs onto bed. Pt instructed in exercises for s/p spinal surgery: AP, HS, QS, GS. Pt could not complete QS exercise d/t pt c/o lack of sensation/numbness. Pt reported feelings of numbness with GS exercises but could complete independently. Pt c/o poor pain management.  Barriers to return to prior living situation: stairs, weakness, assist with transfers/gait  Recommendations for discharge: home with assist  Rationale for recommendations: Pt has help at home for assist when necessary. Plans to get OP PT once she can drive again.       Entered by: Keshia Toro 08/24/2019 10:24 AM

## 2019-08-24 NOTE — PLAN OF CARE
"  VS:    /79 (BP Location: Left arm)   Pulse 79   Temp 98.3  F (36.8  C) (Oral)   Resp 12   Ht 1.727 m (5' 8\")   Wt 88.6 kg (195 lb 5.2 oz)   SpO2 93%   BMI 29.70 kg/m   Stable no abnormalities noted. No reports of SOB, CP, or dizziness. LS: crackles heard lower lobes, room air    Output:    Voids spontaneously w/out difficulty. Last BM 8/21/19, not passing gas yet   Activity:    Assist of 1 w/ walker   Skin: Intact w/ the exception of incisions    Pain:    Moderate, manageable w/ flexeril 10mg 2x daily, oxy 5-10mg q4h, and IV dilaudid for breakthrough pain   Neuro/CMS:    Baseline numbness in R thigh    Dressing(s):    R hip: liquid bandage, open to air, no drainage noted   Diet:    Regular    Equipment:        IV's/Drains:    PIV L forearm: infusing TKO   Plan:       Additional Info:    Schuster removed around 0930. Bladder scanned for 172ccs  Transition from PCA to orals this afternoon            "

## 2019-08-24 NOTE — PROGRESS NOTES
[facility], [place]    Hospitalist Progress Note    Date of Service (when I saw the patient): 08/24/2019    Assessment & Plan     1.  Status post revision of lumbar fusion.  Plan per Orthopedic Surgery.  Last time she had hypovolemia leading to hyponatremia, watch for that.  Would chose normal saline for any fluid replacement if needed.   2.  Hypertension.  Hold lisinopril/hydrochlorothiazide in the postop period.   3.  Diabetes.  Prior to admission it is diet-controlled.   -sliding scale insulin, Montior BG     4.  Vitamin D deficiency and osteoporosis. Ct 50,000 units of vitamin D weekly.     5.  Buster-en-Y gastric bypass.  Avoid NSAIDs.  Zantac BID while here. It home uses As needed.   6.  Hx Postoperative hyponatremiad due dehydration. Encourage to drink mixed fluids.   7.  History of anxiety, depression.  Continue Paxil as before.   8. Need  To keep working on bowels. Start tenisha, miralax, mom, prn dulcolax     DISPOSITION:  Deferred to primary team.    DVT Prophylaxis: Defer to primary service. Educated on ankle pumps   Code Status: Prior    Disposition: per primary     Mathieu Pacheco MD    Interval History .    Had a rough night with pain. On dilaudid PCA. Not passing gas or moving bowels. No chest pain or sob. No cough. No  Fevers.     -Data reviewed today: I reviewed all new labs and imaging results over the last 24 hours. I personally reviewed no images or EKG's today.    Physical Exam   Temp: 98.3  F (36.8  C) Temp src: Oral BP: 137/79 Pulse: 79 Heart Rate: 81 Resp: 12 SpO2: 93 % O2 Device: None (Room air) Oxygen Delivery: 1 LPM  Vitals:    08/23/19 0657   Weight: 88.6 kg (195 lb 5.2 oz)     Vital Signs with Ranges  Temp:  [98.3  F (36.8  C)-99.5  F (37.5  C)] 98.3  F (36.8  C)  Pulse:  [76-93] 79  Heart Rate:  [76-94] 81  Resp:  [9-18] 12  BP: (112-155)/(75-88) 137/79  SpO2:  [88 %-100 %] 93 %  I/O last 3 completed shifts:  In: 1525 [I.V.:1525]  Out: 1540 [Urine:1490;  Blood:50]    Constitutional:  Awake, alert, cooperative, no apparent distress  Respiratory: Clear to auscultation bilaterally, no crackles or wheezing  Cardiovascular: Regular rate and rhythm, normal S1 and S2, and no murmur noted  GI: Normal bowel sounds, soft, non-distended, non-tender  SP spine surgery  Skin; NO rash      Medications     HYDROmorphone         ceFAZolin  1 g Intravenous Q8H     insulin aspart  1-7 Units Subcutaneous TID AC     insulin aspart  1-5 Units Subcutaneous At Bedtime     PARoxetine  60 mg Oral QAM     polyethylene glycol  17 g Oral BID     pregabalin  25 mg Oral TID     ranitidine  150 mg Oral BID     senna-docusate  1 tablet Oral BID     sodium chloride (PF)  3 mL Intracatheter Q8H     vitamin D2  50,000 Units Oral Weekly       Data   Recent Labs   Lab 08/24/19  0536 08/23/19  0750   HGB 9.0* 8.8*   POTASSIUM  --  3.5   CR  --  0.94       Recent Results (from the past 24 hour(s))   XR Surgery LION Fluoro L/T 5 Min    Narrative    This exam was marked as non-reportable because it will not be read by a   radiologist or a [place] non-radiologist provider.

## 2019-08-24 NOTE — PLAN OF CARE
"      VS:   BP (!) 142/78   Pulse 79   Temp 98.7  F (37.1  C) (Axillary)   Resp 10   Ht 1.727 m (5' 8\")   Wt 88.6 kg (195 lb 5.2 oz)   SpO2 97%   BMI 29.70 kg/m       Output:   Schuster catheter in place. LBM 8/21/19   Lungs Clear    Activity:   Asst 2    Skin: Intact except healing incision from lumbar surgery in July and Current L hip incision from displaced hardware   Pain:   PCA dilaudid    Neuro/CMS:   A&O x 4, cms intact and baseline Numbness in R hip to Knee cap thigh    Dressing(s):   None - Liquid bandage CDI    Diet:   Clear Liquid    LDA:   PIV -R  infusing TKO and L hand is saline locked    Equipment:   IV pole x 2, capno and PCA    Plan:   Patient to sit up at bedside/ stand prior to end of shift    Additional Info:   Patient is calm and cooperative for shift.   Please have SW get involved with patient case, patient has reports to writer that she will lose her housing due to these surgical procedures.        "

## 2019-08-25 ENCOUNTER — APPOINTMENT (OUTPATIENT)
Dept: OCCUPATIONAL THERAPY | Facility: CLINIC | Age: 54
End: 2019-08-25
Attending: PHYSICIAN ASSISTANT
Payer: COMMERCIAL

## 2019-08-25 ENCOUNTER — APPOINTMENT (OUTPATIENT)
Dept: GENERAL RADIOLOGY | Facility: CLINIC | Age: 54
End: 2019-08-25
Attending: PHYSICIAN ASSISTANT
Payer: COMMERCIAL

## 2019-08-25 ENCOUNTER — APPOINTMENT (OUTPATIENT)
Dept: PHYSICAL THERAPY | Facility: CLINIC | Age: 54
End: 2019-08-25
Attending: ORTHOPAEDIC SURGERY
Payer: COMMERCIAL

## 2019-08-25 LAB
GLUCOSE BLDC GLUCOMTR-MCNC: 134 MG/DL (ref 70–99)
GLUCOSE BLDC GLUCOMTR-MCNC: 154 MG/DL (ref 70–99)
GLUCOSE BLDC GLUCOMTR-MCNC: 162 MG/DL (ref 70–99)
GLUCOSE BLDC GLUCOMTR-MCNC: 204 MG/DL (ref 70–99)
HGB BLD-MCNC: 9.1 G/DL (ref 11.7–15.7)
SODIUM SERPL-SCNC: 139 MMOL/L (ref 133–144)

## 2019-08-25 PROCEDURE — 85018 HEMOGLOBIN: CPT | Performed by: PHYSICIAN ASSISTANT

## 2019-08-25 PROCEDURE — 25000132 ZZH RX MED GY IP 250 OP 250 PS 637: Performed by: PHYSICIAN ASSISTANT

## 2019-08-25 PROCEDURE — 97165 OT EVAL LOW COMPLEX 30 MIN: CPT | Mod: GO | Performed by: OCCUPATIONAL THERAPIST

## 2019-08-25 PROCEDURE — 97116 GAIT TRAINING THERAPY: CPT | Mod: GP | Performed by: PHYSICAL THERAPIST

## 2019-08-25 PROCEDURE — 97530 THERAPEUTIC ACTIVITIES: CPT | Mod: GP | Performed by: PHYSICAL THERAPIST

## 2019-08-25 PROCEDURE — 25000132 ZZH RX MED GY IP 250 OP 250 PS 637: Performed by: STUDENT IN AN ORGANIZED HEALTH CARE EDUCATION/TRAINING PROGRAM

## 2019-08-25 PROCEDURE — 84295 ASSAY OF SERUM SODIUM: CPT | Performed by: HOSPITALIST

## 2019-08-25 PROCEDURE — 97110 THERAPEUTIC EXERCISES: CPT | Mod: GP | Performed by: PHYSICAL THERAPIST

## 2019-08-25 PROCEDURE — 00000146 ZZHCL STATISTIC GLUCOSE BY METER IP

## 2019-08-25 PROCEDURE — 12000001 ZZH R&B MED SURG/OB UMMC

## 2019-08-25 PROCEDURE — 97535 SELF CARE MNGMENT TRAINING: CPT | Mod: GO | Performed by: OCCUPATIONAL THERAPIST

## 2019-08-25 PROCEDURE — 25000132 ZZH RX MED GY IP 250 OP 250 PS 637: Performed by: HOSPITALIST

## 2019-08-25 PROCEDURE — 99231 SBSQ HOSP IP/OBS SF/LOW 25: CPT | Performed by: HOSPITALIST

## 2019-08-25 PROCEDURE — 25000128 H RX IP 250 OP 636: Performed by: STUDENT IN AN ORGANIZED HEALTH CARE EDUCATION/TRAINING PROGRAM

## 2019-08-25 PROCEDURE — 36415 COLL VENOUS BLD VENIPUNCTURE: CPT | Performed by: HOSPITALIST

## 2019-08-25 PROCEDURE — 72100 X-RAY EXAM L-S SPINE 2/3 VWS: CPT

## 2019-08-25 PROCEDURE — 36415 COLL VENOUS BLD VENIPUNCTURE: CPT | Performed by: PHYSICIAN ASSISTANT

## 2019-08-25 RX ADMIN — ACETAMINOPHEN 1000 MG: 500 TABLET ORAL at 14:32

## 2019-08-25 RX ADMIN — BISACODYL 10 MG: 10 SUPPOSITORY RECTAL at 20:24

## 2019-08-25 RX ADMIN — OXYCODONE HYDROCHLORIDE 10 MG: 5 TABLET ORAL at 06:33

## 2019-08-25 RX ADMIN — OXYCODONE HYDROCHLORIDE 10 MG: 5 TABLET ORAL at 18:28

## 2019-08-25 RX ADMIN — OXYCODONE HYDROCHLORIDE 10 MG: 5 TABLET ORAL at 22:36

## 2019-08-25 RX ADMIN — PREGABALIN 25 MG: 25 CAPSULE ORAL at 08:03

## 2019-08-25 RX ADMIN — RANITIDINE 150 MG: 150 TABLET ORAL at 08:03

## 2019-08-25 RX ADMIN — HYDROMORPHONE HYDROCHLORIDE 0.5 MG: 1 INJECTION, SOLUTION INTRAMUSCULAR; INTRAVENOUS; SUBCUTANEOUS at 01:57

## 2019-08-25 RX ADMIN — SENNOSIDES AND DOCUSATE SODIUM 2 TABLET: 8.6; 5 TABLET ORAL at 08:02

## 2019-08-25 RX ADMIN — RANITIDINE 150 MG: 150 TABLET ORAL at 20:04

## 2019-08-25 RX ADMIN — OXYCODONE HYDROCHLORIDE 10 MG: 5 TABLET ORAL at 14:32

## 2019-08-25 RX ADMIN — POLYETHYLENE GLYCOL 3350 17 G: 17 POWDER, FOR SOLUTION ORAL at 08:03

## 2019-08-25 RX ADMIN — ACETAMINOPHEN 1000 MG: 500 TABLET ORAL at 01:58

## 2019-08-25 RX ADMIN — ACETAMINOPHEN 1000 MG: 500 TABLET ORAL at 08:02

## 2019-08-25 RX ADMIN — OXYCODONE HYDROCHLORIDE 10 MG: 5 TABLET ORAL at 02:29

## 2019-08-25 RX ADMIN — OXYCODONE HYDROCHLORIDE 10 MG: 5 TABLET ORAL at 10:28

## 2019-08-25 RX ADMIN — PREGABALIN 25 MG: 25 CAPSULE ORAL at 20:03

## 2019-08-25 RX ADMIN — PREGABALIN 25 MG: 25 CAPSULE ORAL at 14:32

## 2019-08-25 RX ADMIN — POLYETHYLENE GLYCOL 3350 17 G: 17 POWDER, FOR SOLUTION ORAL at 20:03

## 2019-08-25 RX ADMIN — SENNOSIDES AND DOCUSATE SODIUM 2 TABLET: 8.6; 5 TABLET ORAL at 20:04

## 2019-08-25 RX ADMIN — PAROXETINE 60 MG: 20 TABLET, FILM COATED ORAL at 08:03

## 2019-08-25 RX ADMIN — ACETAMINOPHEN 1000 MG: 500 TABLET ORAL at 20:03

## 2019-08-25 ASSESSMENT — ACTIVITIES OF DAILY LIVING (ADL)
ADLS_ACUITY_SCORE: 16
ADLS_ACUITY_SCORE: 12
ADLS_ACUITY_SCORE: 14

## 2019-08-25 NOTE — PROGRESS NOTES
"Orthopaedic Surgery Progress Note:  08/25/2019     Subjective:   POD2    No acute events overnight. Pain much better controlled this AM- oxycodone, acetaminophen, IV dilaudid for breakthrough. Ambulated with PT yesterday. Tolerating regular diet well without complaints. Feels bloated, has not had BM. Denies chest pain, shortness of breath.     Objective:   /87   Pulse 80   Temp 99.6  F (37.6  C) (Oral)   Resp 16   Ht 1.727 m (5' 8\")   Wt 88.6 kg (195 lb 5.2 oz)   SpO2 95%   BMI 29.70 kg/m    No intake/output data recorded.  Gen: NAD. Resting comfortably in bed  Resp: Breathing comfortably on RA  Drain: None    Musculoskeletal:     Lower extremities:  Motor Strength Right Left   Hip flexion: L1, L2, L3 5/5 5/5   Hip adduction: L2, L3 5/5 5/5   Knee flexion: S1 4/5 4/5   Knee extension: L3, L4 5/5 4/5   Ankle dosiflexion: L4, L5 5/5 5/5   EHL: L5 5/5 5/5   Ankle plantarflexion: S1 5/5 5/5     Sensation from L1-S2 is preserved.    Labs:  Lab Results   Component Value Date    WBC 8.1 07/30/2019    HGB 9.0 (L) 08/24/2019     07/30/2019       Anaerobic bacterial culture - Culture negative monitoring continues   Fluid Culture Aerobic Bacterial - Culture negative monitoring continues        Assessment & Plan:   Yun Sagastume is a 54 year old female now s/p L4-5 ALIF revision on 8/23/2019 with Dr. Carrion and Rudy.     Ortho Primary  Activity: Up with assist until independent. No excessive bending or twisting. No lifting >10 lbs x 6 weeks. No Shelia lift for transfers.   Weight bearing status: WBAT.  Pain management: Transition from IV to PO as tolerated. No NSAIDs   Antibiotics: Ancef 1 gm q 8 hours x 24 hours.  Diet: Begin with clear fluids and progress diet as tolerated.   DVT prophylaxis: SCDs only. No chemical DVT ppx needed.  Imaging: XR Upright  PTDC - ordered.  Labs: labs PRN  Bracing/Splinting: None.  Dressings: left lateral abdominal incision Dermabond.  Drains: None.  Schuster catheter: Remove " POD#1.   Physical Therapy/Occupational Therapy: Eval and treat.  Cultures: Pending, follow culture results closely.    Consults: Hospitalist.  Follow-up: Clinic with Dr. Carrion in 6 weeks with repeat x-rays.   Disposition: Pending progress with therapies, pain control on orals, and medical stability, anticipate discharge to [facility] on POD #3.    Orthopaedics surgery staff for this patient is Dr. Carrion.    ------------------------------------------------------------------------------------------    [   ] Discontinue PCA  [   ] Post xrays done.  [   ] Discharge orders done.  [   ] Discharge summary started.    Chava Moss MD  Orthopaedic Surgery PGY-1  [phone]    FOLLOWUP:    Future Appointments   Date Time Provider Department Center   8/24/2019  9:00 AM Tabby Barakat Pt, PT [facility] [place]   8/24/2019  1:00 PM Tabby Barakat Pt, PT [facility] [place]   8/24/2019  2:30 PM Maria A Overton, OT [facility] [place]   10/3/2019  3:00 PM Jeremiah Carrion MD [facility]   11/14/2019 10:30 AM Jeremiah Carrion MD [facility]

## 2019-08-25 NOTE — PLAN OF CARE
VS:   VSS. Glucose monitored per protocol- no insulin given this evening.    Output:   Voiding adequate amounts- passed PVRs.    Activity:   Up SBA with walker. Abdominal binder to aid with abdominal pain related to incision.    Skin: Intact except surgical incisions    Pain:   Controlled with PRN oxycodone and IV Dilaudid given once. Reported pain is only related to anterior incision- refused ice pack.    Neuro/CMS:   Alert and oriented x4. Baseline numbness in right and left thigh.    Dressing(s):   Liquid bandage to anterior incision. Primapore placed on posterior incision (from previous surgery) for protection with abdominal binder.    Diet:   Regular- tolerating well, no nausea, low appetite.    Equipment:   Walker, PCDs    IV's/Drains:   PIV x2 saline locked    Plan:   Continue pain management and increase in activity. Pt able to make all needs known.

## 2019-08-25 NOTE — PLAN OF CARE
Discharge Planner PT   Patient plan for discharge: home with assist  Current status: Demonstrated log rolling technique with SBA x 1. Supine to/from sit with SBA x 1. Sit to/from stand at FWW with SBA x 1. Pt used restroom with distant supervision and demonstrated good standing balance. Pt amb with FWW with SBA x 1 for 200' with no rest breaks. Reviewed exercises and issued HEP: AP, GS, QS, HS. Pt required verbal cues for HS, GS, and QS. Plan to add LAQ to HEP this afternoon. Pt reports feeling much better today and in less pain d/t abdominal binder.   Barriers to return to prior living situation: safety with stairs, transfers  Recommendations for discharge: home with assist  Rationale for recommendations: Pt will have assist at home if needed. Progressing well with transfers and mobility.       Entered by: Keshia Toro 08/25/2019 9:41 AM

## 2019-08-25 NOTE — PLAN OF CARE
VS: Stable.   O2: RA.   Output: Voiding adequately in bathroom.   Last BM: 8/21/19, began passing flatus this am, BS active, reports abdominal pain is incisional.   Activity: Ax1 with walker and abdominal binder, WBAT.   Skin: Incisions.   Pain: Aching incisional pain managed with IV dilaudid, oxycodone, and scheduled tylenol.   CMS: Numbness in bilateral thighs.   Dressing: Primapore dressing on back CDI. Left abdominal incision JAMEL.   Diet: Regular, carb count,  at 0226.   LDA: PIV left hand SL.   Equipment: Walker, abdominal binder, IS, pt refused compression stockings and PCDs, pillows, call light within reach.   Plan: Continue to monitor and manage pain.   Additional Info:

## 2019-08-25 NOTE — PLAN OF CARE
VS:   VSS.    Output:   Voiding adequate amounts. Suppository given- no BM result, passing flatus.    Activity:   Up SBA with walker. Abdominal binder in place to help with pain in anterior incision    Skin: Intact except surgical incisions    Pain:   Pt reported pain is only incisional pain, no spasms reported. Managed with PRN Oxycodone and scheduled Tylenol.    Neuro/CMS:   Baseline numbness in right thigh.    Dressing(s):   Primapore to pre-existing posterior incision to protect from rubbing with abdominal binder.    Diet:   Tolerating regular diet well    Equipment:   Walker, PCDs    IV's/Drains:   PIV left saline locked    Plan:   Plans to discharge home tomorrow. Pt able to make all needs known.

## 2019-08-25 NOTE — PROGRESS NOTES
08/25/19 1435   Quick Adds   Type of Visit Initial Occupational Therapy Evaluation   Living Environment   Lives With child([place]), adult  (son)   Living Arrangements house  (1 level with basement; all needs met on main floor)   Home Accessibility stairs to enter home;stairs within home   Number of Stairs, Main Entrance 3   Number of Stairs, Within Home, Primary   (flight to basement)   Transportation Anticipated car, drives self;family or friend will provide   Living Environment Comment Pt reports girlfriend Hoa can assist as needed at discharge   Self-Care   Usual Activity Tolerance good   Current Activity Tolerance poor   Regular Exercise No   Equipment Currently Used at Home shower chair;walker, rolling  (4ww, reacher)   Activity/Exercise/Self-Care Comment Pt was (I) with mobility/transfers with no AD   Functional Level   Ambulation 0-->independent   Transferring 0-->independent   Toileting 0-->independent   Bathing 3-->assistive equipment and person   Dressing 0-->independent   Eating 0-->independent   Communication 0-->understands/communicates without difficulty   Swallowing 0-->swallows foods/liquids without difficulty   Cognition 0 - no cognition issues reported   Fall history within last six months no   Which of the above functional risks had a recent onset or change? ambulation;transferring;toileting;bathing;dressing   Prior Functional Level Comment Pt was (I) with most ADL/IADLs including driving; reports she does have girlfriend Hoa assist with bathing at baseline   General Information   Onset of Illness/Injury or Date of Surgery - Date 08/23/19   Referring Physician Ezra Madera PA-C   Additional Occupational Profile Info/Pertinent History of Current Problem Pt is a 54 year old female now s/p L4-5 ALIF revision on 8/23/2019 with Dr. Carrion and Rudy   Precautions/Limitations spinal precautions   Weight-Bearing Status - LUE   (10# lifting/pushing/pulling restriction)    Weight-Bearing Status - RUE   (10# lifting/pushing/pulling restriction)   Weight-Bearing Status - LLE full weight-bearing   Weight-Bearing Status - RLE full weight-bearing   Cognitive Status Examination   Orientation orientation to person, place and time   Level of Consciousness alert   Follows Commands (Cognition) WNL   Memory intact   Visual Perception   Visual Perception Wears glasses  (all the time; no new concerns)   Sensory Examination   Sensory Quick Adds Other (describe)   Sensory Comments pt reports neuropathy in B hands and feet and baseline numbness/tingling in anterior thighs   Pain Assessment   Patient Currently in Pain Yes, see Vital Sign flowsheet   Range of Motion (ROM)   ROM Comment BUEs WFL   Strength   Strength Comments N/T due to precautions   Transfer Skills   Transfer Comments Pt declined - reports she has been doing without assist   Transfer Skill: Toilet Transfer   Level of [place]: Toilet independent  (per pt report)   Activities of Daily Living Analysis   Impairments Contributing to Impaired Activities of Daily Living balance impaired;pain;post surgical precautions   General Therapy Interventions   Planned Therapy Interventions ADL retraining;IADL retraining;bed mobility training;ROM;strengthening;stretching;transfer training;home program guidelines;progressive activity/exercise   Clinical Impression   Criteria for Skilled Therapeutic Interventions Met yes, treatment indicated   OT Diagnosis decreased ADL/IADL (I)   Influenced by the following impairments pain, surgical precautions, impaired balance   Assessment of Occupational Performance 3-5 Performance Deficits   Identified Performance Deficits functional transfers, toileting, bathing, dressing, household chores   Clinical Decision Making (Complexity) Low complexity   Therapy Frequency Daily   Predicted Duration of Therapy Intervention (days/wks) eval + 1 tx   Anticipated Discharge Disposition Home with Assist   Risks and Benefits  "of Treatment have been explained. Yes   Patient, Family & other staff in agreement with plan of care Yes   [facility] TM \"6 Clicks\"   2016, Trustees of [facility], under license to [employer].  All rights reserved.   6 Clicks Short Forms Daily Activity Inpatient Short Form   [facility]  \"6 Clicks\" Daily Activity Inpatient Short Form   1. Putting on and taking off regular lower body clothing? 3 - A Little   2. Bathing (including washing, rinsing, drying)? 3 - A Little   3. Toileting, which includes using toilet, bedpan or urinal? 4 - None   4. Putting on and taking off regular upper body clothing? 4 - None   5. Taking care of personal grooming such as brushing teeth? 4 - None   6. Eating meals? 4 - None   Daily Activity Raw Score (Score out of 24.Lower scores equate to lower levels of function) 22   Total Evaluation Time   Total Evaluation Time (Minutes) 8     "

## 2019-08-25 NOTE — PLAN OF CARE
"  VS:    /67 (BP Location: Left arm)   Pulse 88   Temp 98.7  F (37.1  C) (Oral)   Resp 16   Ht 1.727 m (5' 8\")   Wt 88.6 kg (195 lb 5.2 oz)   SpO2 96%   BMI 29.70 kg/m   Stable no abnormalities noted. No reports of SOB, CP, or dizziness. LS clear equal bilaterally, on room air    Output:    Voids spontaneously w/out difficulty. Last BM /21/19, passing gas    Activity:    Assist of 1 w/ walker   Skin: Intact w/ the exception of incisions    Pain:    Moderate, manageable w/ oxy 5-10mg q4h. Has available flexeril 10mg 2x daily, and IV dilaudid for breakthrough pain    Neuro/CMS:    Baseline numbness R thigh. Otherwise denies any other numbness or tingling    Dressing(s):    L hip: liquid bandage, open to air, no drainage   Posterior back: approximated, open to air   Diet:    Regular   Equipment:        IV's/Drains:    PIV L forearm: saline locked   Plan:    Discharge home POD#3 per ortho note, will continue to monitor    Additional Info:    Standing x-ray completed            "

## 2019-08-25 NOTE — PROGRESS NOTES
[facility], [place]    Hospitalist Progress Note    Date of Service (when I saw the patient): 08/25/2019    Assessment & Plan     1.  Status post revision of lumbar fusion.  Plan per Orthopedic Surgery.  Last time she had hypovolemia leading to hyponatremia, watch for that.  Would chose normal saline for any fluid replacement if needed. Check Na level.   2.  Hypertension.  Ct to Hold lisinopril/hydrochlorothiazide in the postop period.   3.  Diabetes.  Prior to admission it is diet-controlled.   -BG are controlled.   -sliding scale insulin, Montior BG     4.  Vitamin D deficiency and osteoporosis. Ct 50,000 units of vitamin D weekly.     5.  Buster-en-Y gastric bypass.  Avoid NSAIDs.  Zantac BID while here. It home uses As needed.   6.  Hx Postoperative hyponatremiad due dehydration. Encourage to drink mixed fluids.   7.  History of anxiety, depression.  Continue Paxil as before.   8. Constipation. Start tenisha, miralax, mom, prn dulcolax       DISPOSITION:  Deferred to primary team.    DVT Prophylaxis: Defer to primary service. Educated on ankle pumps   Code Status: Prior    Disposition: per primary     Mathieu Pacheco MD    Interval History .    Doing well. No BM yet. Passing gas. No chest pain or sob. No cough or phlegm.     -Data reviewed today: I reviewed all new labs and imaging results over the last 24 hours. I personally reviewed no images or EKG's today.    Physical Exam   Temp: 98.7  F (37.1  C) Temp src: Oral BP: 118/67 Pulse: 88 Heart Rate: 78 Resp: 16 SpO2: 96 % O2 Device: None (Room air)    Vitals:    08/23/19 0657   Weight: 88.6 kg (195 lb 5.2 oz)     Vital Signs with Ranges  Temp:  [98.4  F (36.9  C)-99.6  F (37.6  C)] 98.7  F (37.1  C)  Pulse:  [80-88] 88  Heart Rate:  [78] 78  Resp:  [14-16] 16  BP: (118-134)/(67-87) 118/67  SpO2:  [95 %-96 %] 96 %  I/O last 3 completed shifts:  In: -   Out: 600 [Urine:600]    Constitutional:  Awake, alert, cooperative, no apparent  distress  Respiratory: Clear to auscultation bilaterally, no crackles or wheezing  Cardiovascular: Regular rate and rhythm, normal S1 and S2, and no murmur noted  GI: Normal bowel sounds, soft, non-distended, non-tender  SP spine surgery  Skin; NO rash       Medications       acetaminophen  1,000 mg Oral Q6H     insulin aspart  1-7 Units Subcutaneous TID AC     insulin aspart  1-5 Units Subcutaneous At Bedtime     PARoxetine  60 mg Oral QAM     polyethylene glycol  17 g Oral BID     pregabalin  25 mg Oral TID     ranitidine  150 mg Oral BID     senna-docusate  2 tablet Oral BID     sodium chloride (PF)  3 mL Intracatheter Q8H     vitamin D2  50,000 Units Oral Weekly       Data   Recent Labs   Lab 08/25/19  0604 08/24/19  0536 08/23/19  0750   HGB 9.1* 9.0* 8.8*   POTASSIUM  --   --  3.5   CR  --   --  0.94       No results found for this or any previous visit (from the past 24 hour(s)).

## 2019-08-26 ENCOUNTER — APPOINTMENT (OUTPATIENT)
Dept: PHYSICAL THERAPY | Facility: CLINIC | Age: 54
End: 2019-08-26
Attending: ORTHOPAEDIC SURGERY
Payer: COMMERCIAL

## 2019-08-26 VITALS
HEIGHT: 68 IN | RESPIRATION RATE: 16 BRPM | WEIGHT: 195.33 LBS | TEMPERATURE: 97.6 F | SYSTOLIC BLOOD PRESSURE: 118 MMHG | BODY MASS INDEX: 29.6 KG/M2 | OXYGEN SATURATION: 96 % | DIASTOLIC BLOOD PRESSURE: 65 MMHG | HEART RATE: 100 BPM

## 2019-08-26 PROBLEM — Z98.890 S/P LUMBAR SPINE OPERATION: Status: ACTIVE | Noted: 2019-08-26

## 2019-08-26 LAB
GLUCOSE BLDC GLUCOMTR-MCNC: 121 MG/DL (ref 70–99)
GLUCOSE BLDC GLUCOMTR-MCNC: 199 MG/DL (ref 70–99)
RADIOLOGIST FLAGS: NORMAL

## 2019-08-26 PROCEDURE — 97110 THERAPEUTIC EXERCISES: CPT | Mod: GP

## 2019-08-26 PROCEDURE — 25000132 ZZH RX MED GY IP 250 OP 250 PS 637: Performed by: PHYSICIAN ASSISTANT

## 2019-08-26 PROCEDURE — 00000146 ZZHCL STATISTIC GLUCOSE BY METER IP

## 2019-08-26 PROCEDURE — 25000132 ZZH RX MED GY IP 250 OP 250 PS 637: Performed by: STUDENT IN AN ORGANIZED HEALTH CARE EDUCATION/TRAINING PROGRAM

## 2019-08-26 PROCEDURE — 97116 GAIT TRAINING THERAPY: CPT | Mod: GP

## 2019-08-26 PROCEDURE — 25000132 ZZH RX MED GY IP 250 OP 250 PS 637: Performed by: HOSPITALIST

## 2019-08-26 RX ORDER — ACETAMINOPHEN 500 MG
500-1000 TABLET ORAL EVERY 6 HOURS PRN
Qty: 60 TABLET | Refills: 0 | Status: SHIPPED | OUTPATIENT
Start: 2019-08-26 | End: 2019-08-26

## 2019-08-26 RX ORDER — ACETAMINOPHEN 500 MG
1000 TABLET ORAL EVERY 6 HOURS
Qty: 1 BOTTLE | Refills: 0 | Status: SHIPPED | OUTPATIENT
Start: 2019-08-26

## 2019-08-26 RX ORDER — OXYCODONE HYDROCHLORIDE 10 MG/1
10-15 TABLET ORAL
Qty: 12 TABLET | Refills: 0 | Status: SHIPPED | OUTPATIENT
Start: 2019-08-26 | End: 2019-08-26

## 2019-08-26 RX ORDER — OXYCODONE HYDROCHLORIDE 10 MG/1
5-10 TABLET ORAL
Qty: 12 TABLET | Refills: 0 | Status: SHIPPED | OUTPATIENT
Start: 2019-08-26 | End: 2019-08-26

## 2019-08-26 RX ORDER — ACETAMINOPHEN 500 MG
500-1000 TABLET ORAL EVERY 6 HOURS PRN
Qty: 60 TABLET | Refills: 0 | COMMUNITY
Start: 2019-08-26 | End: 2019-08-26

## 2019-08-26 RX ORDER — OXYCODONE HYDROCHLORIDE 5 MG/1
5-10 TABLET ORAL EVERY 4 HOURS PRN
Qty: 30 TABLET | Refills: 0 | Status: SHIPPED | OUTPATIENT
Start: 2019-08-26 | End: 2019-08-29

## 2019-08-26 RX ADMIN — POLYETHYLENE GLYCOL 3350 17 G: 17 POWDER, FOR SOLUTION ORAL at 09:09

## 2019-08-26 RX ADMIN — OXYCODONE HYDROCHLORIDE 10 MG: 5 TABLET ORAL at 07:14

## 2019-08-26 RX ADMIN — MAGNESIUM HYDROXIDE 30 ML: 400 SUSPENSION ORAL at 09:06

## 2019-08-26 RX ADMIN — OXYCODONE HYDROCHLORIDE 10 MG: 5 TABLET ORAL at 11:23

## 2019-08-26 RX ADMIN — SENNOSIDES AND DOCUSATE SODIUM 2 TABLET: 8.6; 5 TABLET ORAL at 09:06

## 2019-08-26 RX ADMIN — OXYCODONE HYDROCHLORIDE 10 MG: 5 TABLET ORAL at 02:53

## 2019-08-26 RX ADMIN — ACETAMINOPHEN 1000 MG: 500 TABLET ORAL at 02:53

## 2019-08-26 RX ADMIN — ACETAMINOPHEN 1000 MG: 500 TABLET ORAL at 09:06

## 2019-08-26 RX ADMIN — RANITIDINE 150 MG: 150 TABLET ORAL at 09:06

## 2019-08-26 RX ADMIN — PREGABALIN 25 MG: 25 CAPSULE ORAL at 09:06

## 2019-08-26 RX ADMIN — PAROXETINE 60 MG: 20 TABLET, FILM COATED ORAL at 09:06

## 2019-08-26 ASSESSMENT — ACTIVITIES OF DAILY LIVING (ADL)
ADLS_ACUITY_SCORE: 14

## 2019-08-26 NOTE — PLAN OF CARE
VS: Stable.   O2: RA.   Output: Voiding adequately in bathroom.   Last BM: 8/21/19, passing flatus, suppository given yesterday.   Activity: SBA with walker, WBAT.   Skin: Incisions.   Pain: Sore, aching incisional pain managed with oxycodone and scheduled tylenol.   CMS: Numbness right thigh.   Dressing: Posterior incision covered with primapore to reduce irritation with abdominal binder, CDI. Anterior incision JAMEL.   Diet: Regular, carb count,  at 0250.   LDA: PIV left hand SL.   Equipment: Walker, abdominal binder, IS, compressions stockings and PCDs off overnight, pillows, call light within reach.   Plan: Anticipate discharge today.   Additional Info:

## 2019-08-26 NOTE — OP NOTE
Procedure Date: 08/23/2019      SURGEON:  Jeremiah Carrion MD.   COSURGEON:  Becky Grant MD, vascular surgeon.  Dr. Grant's expertise was necessary for safe revision exposure of anterior lumbar spine.   ASSISTANT:  Ezra Madera PA-C.  No resident available.      PREOPERATIVE DIAGNOSES:   1.  Partially extruded or migrated interbody cage, L4-L5.   2.  Four weeks status post anterior lumbar interbody fusion, L4-L5 and L5-S1, with posterior instrumented spinal fusion L3 to pelvis (7/29/2019).      POSTOPERATIVE DIAGNOSES:   1.  Partially extruded or migrated interbody cage, L4-L5.   2.  Four weeks status post anterior lumbar interbody fusion, L4-L5 and L5-S1, with posterior instrumented spinal fusion L3 to pelvis (7/29/2019).      PROCEDURES PERFORMED:   1.  Revision anterior lumbar interbody fusion (ALIF) in lateral position, L4-L5, using crushed cancellous allograft.   2.  Revision placement of interbody titanium cage, with removal of previously implanted titanium cage, L4-L5.  Two anterior integrated screw fixation L4-5 with integrated screws going through the cage into L4 and L5 vertebral bodies.      ANESTHESIA:  General endotracheal.   LOCAL ANESTHESIA:  0.25% marcaine with epinephrine = 30 mL.  ESTIMATED BLOOD LOSS:  50 mL.   COMPLICATIONS:  None apparent.   FINDINGS:  Partially anteriorly extruded L4-L5 cage that was also quite loose, especially after removal of single integrated screw through the cage into the L4 vertebral body.   SPECIMENS:  Superficial / subcutaneous fluid from left abdominal surgical wound - submitted to Microbiology for aerobic and anaerobic cultures.  IMPLANTS AND EQUIPMENT USED:   1.  NuVasive Base titanium cage:  L4-5 = 15 mm anterior height x 8 mm posterior height x 38 mm width x 28 mm depth, 15 degrees lordotic.  Two screws, each 5.0 x 22.5 mm, through L4-5 cage into the L4 and L5 vertebral bodies.   2.  Crushed cancellous allograft 15 mL.   3.  TXA (tranexamic acid) 30 mg/kg  loading dose, then 10 mg/kg per hour.      INDICATIONS FOR PROCEDURE:  54-year-old female 4 weeks status post above-mentioned procedure.  Postoperative x-rays and CT scan showed partial anterior migration of the cage.  Compared to intraoperative images.  The patient was also complaining of bilateral anterior thigh numbness, which has not improved since.  I cannot tell if these symptoms are related to partial cage back out.  Otherwise, the cage extrusion seems asymptomatic.  However, with potential further cage migration and anticipated greater difficulty in revising a later date, I recommended early revision to secure cage placement and prevent further displacement and further migration.  The patient consented after thorough discussion of rationale, risks, benefits, and alternatives.      DETAILS OF PROCEDURE:  The patient was properly identified in preop.  The patient wheeled to the operating theater.  Brief earlier performed.  General anesthesia administered.  Schuster catheter inserted.  The patient positioned lateral left side up on a flat Trios table.  Axillary roll placed.  Left arm placed over overhead arm board.  Position secured using tape.  Left abdomen and flank prepped with chlorhexidine, alcohol and ChloraPrep and draped, in sterile fashion.  Intraoperative surgical timeout was performed.  The patient kept relaxed throughout the procedure.      I re-incised the previous surgical incision on the left abdomen.  There was partial dehiscence at the bottom part of the incision.  I ellipsed the skin around this partial dehiscence to facilitate better wound closure afterwards.  We obtained lateral C-arm images to identify the approximate location of the L4-L5 cage.  I noted that this was significantly higher than where the incision was.  I, thus, extended the incision approximately another 2 cm.  I went through the same surgical plane as before; sutures removed as we went along.  I was not able to reopen the  plane, and got into the retroperitoneal space. At this point, I noted some retroperitoneal scarring.  I tried to perform blunt dissection using a gloved finger.  I, however, noted a tear in the peritoneum.  I repaired this using running 0 Vicryl and running 2-0 Vicryl.  I was able to obtain good repair.  I continued to take down the retroperitoneal scar until I was able to get down to the psoas.  I retracted the peritoneal contents anteriorly and medially.  I used the NuTaqua lateral ALIF retractor system.  I was able to identify the left common carotid artery and noted its pulsations.  I dissected medial to this and was able to palpate the cage.  I was able to partially expose the right half of the cage.  This was enough for me to insert the screwdriver and remove the single fixation screw.  At this point, Dr. Grant arrived and performed mobilization of the left common carotid vessels.  The ureter was also noted to be stuck against the vessel.  She likewise mobilized the ureter away from the vessels and made sure that this was protected the whole time.  In this way, we were able to retract the vessels laterally and expose the cage.  I used a Stevens to release the cage above and below.  The cage was noted to be quite loose as it was already partially extruded anterior to the disk space.  We were able to easily get the cage out.  I inspected the disk space and removed some scar and fibrinous tissue.  We irrigated the disk space.  I used a curette to prepare the endplates.  I then inserted our trial spacer.  I elected to downsize from a 20-degree to a 15-degree cage.  I also used a shorter height cage.  I selected cage size.  This was packed with crushed cancellous allograft.  I then impacted the cage into the disk space.  I placed 2 screws through the cage, one going up to L4 and another going down to L5.  Thus, the fixation spanned the segment and constitutes anterior fixation L4-L5.  The cage has 3 screw holes.   However, the right most screw hole was not easily accessible, and I decided not to place a screw through this hole anymore.  Lateral and AP C-arm images showed good cage and screw position.  This was deemed acceptable.      Irrigation performed.  Closure performed by Dr. Grant.  Please refer to her report for details as well.  Then, 30 mL of local anesthetic injected.  Sterile dressings applied.  The patient tolerated the procedure well, taken off general anesthetic, transferred to recovery room in satisfactory condition.        POSTOPERATIVE PLAN:  Postop admit to surgical floor.  Antibiotics x24 hours.  Mechanical DVT prophylaxis.  Hospitalist consult for medical co-management.  PT and OT consults.  Advance diet as tolerated once good bowel sounds.  Anticipate discharge in 2-3 days.  Lumbar AP, lateral standing x-rays prior to discharge.  Return to clinic in 2 weeks with EOS full spine AP, lateral standing x-rays.         SKYLAR PRYOR MD             D: 2019   T: 2019   MT: WT      Name:     MAGALYS HODGES   MRN:      [id number]        Account:        [id number]   :      1965           Procedure Date: 2019      Document: [id number]

## 2019-08-26 NOTE — PLAN OF CARE
Pt. discharged at 1315 to home, was accompanied by transport, and left with personal belongings. Pt. received complete discharge paperwork and medications as filled by discharge pharmacy. Pt. was given times of last dose for all discharge medications in writing on discharge medication sheets. Discharge teaching included medication administration, pain management, activity restrictions, and signs and symptoms of infection. Pt. to follow up with Dr. Carrion in 6 weeks. Pt. had no further questions at the time of discharge and no unmet needs were identified.

## 2019-08-26 NOTE — PROGRESS NOTES
"Orthopaedic Surgery Progress Note:  08/26/2019     Subjective:   POD3    No acute events overnight. Pain well controlled this AM- oxycodone, acetaminophen. Discharged from OT. Tolerating regular diet well without complaints. No BM yet, passing gas. Denies chest pain, shortness of breath.    Tentative discharge today.     Objective:   /76 (BP Location: Right arm)   Pulse 64   Temp 97.6  F (36.4  C) (Oral)   Resp 16   Ht 1.727 m (5' 8\")   Wt 88.6 kg (195 lb 5.2 oz)   SpO2 94%   BMI 29.70 kg/m    No intake/output data recorded.  Gen: NAD. Resting comfortably in bed  Resp: Breathing comfortably on RA  Drain: None    Musculoskeletal: Primapore posterior dressing CDI, dermabond anterior incision.    Lower extremities:  Motor Strength Right Left   Hip flexion: L1, L2, L3 5/5 5/5   Hip adduction: L2, L3 5/5 5/5   Knee flexion: S1 4/5 4/5   Knee extension: L3, L4 4/5 4/5   Ankle dosiflexion: L4, L5 5/5 5/5   EHL: L5 5/5 5/5   Ankle plantarflexion: S1 5/5 5/5     Sensation from L1-S2 is preserved.    Labs:  Lab Results   Component Value Date    WBC 8.1 07/30/2019    HGB 9.1 (L) 08/25/2019     07/30/2019       Anaerobic bacterial culture - Culture negative monitoring continues   Fluid Culture Aerobic Bacterial - Culture negative monitoring continues        Assessment & Plan:   Yun Sagastume is a 54 year old female now s/p L4-5 ALIF revision on 8/23/2019 with Dr. Carrion and Rudy.     Doing well today. Tentative discharge today.    Ortho Primary  Activity: Up with assist until independent. No excessive bending or twisting. No lifting >10 lbs x 6 weeks. No Shelia lift for transfers.   Weight bearing status: WBAT.  Pain management: Transition from IV to PO as tolerated. No NSAIDs   Antibiotics: Ancef 1 gm q 8 hours x 24 hours.  Diet: Begin with clear fluids and progress diet as tolerated.   DVT prophylaxis: SCDs only. No chemical DVT ppx needed.  Imaging: XR Upright  PTDC - completed.  Labs: labs " PRN  Bracing/Splinting: None.  Dressings: left lateral abdominal incision Dermabond, posterior incision primapore  Drains: None.  Schuster catheter: Removed.  Physical Therapy/Occupational Therapy: Eval and treat.  Cultures: Pending, follow culture results closely.    Consults: Hospitalist.  Follow-up: Clinic with Dr. Carrion in 6 weeks with repeat x-rays.   Disposition: Pending progress with therapies, pain control on orals, and medical stability, anticipate discharge to home on POD #3-4.    Orthopaedics surgery staff for this patient is Dr. Carrion.    ------------------------------------------------------------------------------------------    [x] Discontinue PCA  [x] Post xrays done.  [   ] Discharge orders done.  [x] Discharge summary started.    Chava Moss MD  Orthopaedic Surgery PGY-1  [phone]    FOLLOWUP:    Future Appointments   Date Time Provider Department Center   8/24/2019  9:00 AM Tabby Barakat Pt, PT [facility]   8/24/2019  1:00 PM Tabby Barakat Pt, PT [facility]   8/24/2019  2:30 PM Maria A Overton, OT [facility]   10/3/2019  3:00 PM Jeremiah Carrion MD [facility]   11/14/2019 10:30 AM Jeremiah Carrion MD [facility]

## 2019-08-26 NOTE — PLAN OF CARE
Discharge Planner PT   Patient plan for discharge: Home with assist  Current status: Overall doing well. Independent with bed mobility and transfers, T[place]ka with ambulation using walker. Completes 3 stairs with bilateral rails, safe throughout. No questions endorsed with LE strengthening program. No further acute inpatient PT needs.   Barriers to return to prior living situation: No PT barriers  Recommendations for discharge: Home with assist  Rationale for recommendations: Mobilizing safely and appropriately    Physical Therapy Discharge Summary    Reason for therapy discharge:    All goals and outcomes met, no further needs identified.    Progress towards therapy goal(s). See goals on Care Plan in [facility] electronic health record for goal details.  Goals met    Therapy recommendation(s):    Continue home exercise program.           Entered by: Elizabeth Crouch 08/26/2019 10:31 AM

## 2019-08-27 ENCOUNTER — PATIENT OUTREACH (OUTPATIENT)
Dept: CARE COORDINATION | Facility: CLINIC | Age: 54
End: 2019-08-27

## 2019-08-27 NOTE — H&P
S> 54-year-old female, now 4 weeks status post anterior posterior fusion L3 to pelvis.  On her discharge x-rays we noted mild anterior migration are back out of the L4-5 cage.  Although deemed to be asymptomatic at that time, there was potential for further migration and need for subsequent revision.  I conferred with my partners, and consensus recommendation was made to perform early prophylactic revision, as later revision would be fraught with difficulty given the anticipated amounts amount of dense scarring that would have already formed by then.  I explained the situation to the patient.  She was amenable to revision surgery.  Unfortunately it could not be scheduled during that admission, due to unavailability of vascular/access surgeon.  Since it might take a while before our vascular surgeon would become available, I did not think it was a good idea to keep the patient in the hospital indefinitely, as there is risk of francisca nosocomial infection.  We does discharge the patient with plan of setting her up for semi-elective revision surgery to revise the L4-5 anterior cage.  Next    After coordination with Dr. Grant and the OR scheduling office, we were able to get patient scheduled for surgery today 8/23/2019.  Unfortunately there was no preanesthesia clinic appointment slot available, or the patient was unable unable to make the appointment.  Rather than reschedule the surgery further back, I agreed to simply update her H&P today at the preoperative area.    I saw patient today at the preop area.  She reports no significant change in medical health over the past month.  Her surgical incisions are healing well.  However the one on her left abdomen has small area of dehiscence, and with some swelling.  She still also reports numbness and aching over her bilateral anterior thighs, present since after the surgery on 7/29/2019.  Her preoperative symptoms however seem to have already improved.  Postsurgical  pain for the most part part also improved.  Next    O> on exam patient is very pleasant, healthy-appearing, alert, oriented x3 and cooperative.  Responds appropriately to questions.  Chest, lung and heart exam within normal limits.  Good chest expansion and breathing.  No shortness of breath.  Normal heart rate, regular rhythm.    Abdomen soft and nontender.  Grossly neurologically intact all extremities, with good perfusion.  Next    A>  - Partial backout or migration of a left cage L4-5, 4 weeks status post surgery.  - Medically status quo    P> May proceed with planned surgery today.

## 2019-08-28 ENCOUNTER — MYC MEDICAL ADVICE (OUTPATIENT)
Dept: ORTHOPEDICS | Facility: CLINIC | Age: 54
End: 2019-08-28

## 2019-08-28 LAB
BACTERIA SPEC CULT: NO GROWTH
SPECIMEN SOURCE: NORMAL

## 2019-08-29 ENCOUNTER — MYC MEDICAL ADVICE (OUTPATIENT)
Dept: ORTHOPEDICS | Facility: CLINIC | Age: 54
End: 2019-08-29

## 2019-08-29 DIAGNOSIS — Z98.890 STATUS POST SPINAL SURGERY: ICD-10-CM

## 2019-08-29 RX ORDER — OXYCODONE HYDROCHLORIDE 5 MG/1
5-10 TABLET ORAL EVERY 4 HOURS PRN
Qty: 60 TABLET | Refills: 0 | Status: SHIPPED | OUTPATIENT
Start: 2019-08-29 | End: 2019-08-29

## 2019-08-29 RX ORDER — OXYCODONE HYDROCHLORIDE 5 MG/1
5-10 TABLET ORAL EVERY 4 HOURS PRN
Qty: 60 TABLET | Refills: 0 | Status: SHIPPED | OUTPATIENT
Start: 2019-08-29 | End: 2019-09-04

## 2019-08-29 NOTE — TELEPHONE ENCOUNTER
See my Chart message.  I called pt. To let her know that I verified with pharmacy that  electronic E scribing system worked by Rod EVANS so she can  Oxycodone at home pharmacy.  See other refill  Notes today.  Belinda Gary RN.

## 2019-08-29 NOTE — TELEPHONE ENCOUNTER
Postop I call ed pt back.  States has 2 days left of Oxycodone. Also taking the Tylenol & Lyrica as ordered.  Refilled Oxycodone-will try new Escribe system. Pt. Aware.  Has postop appt. Scheudled.  Call back prn. Pt. Agreed.  S.O./Belinda Gary RN.     EScribe did not work in 's name so redid in [facility] name.   Belinda Gary RN.

## 2019-08-30 LAB
BACTERIA SPEC CULT: NORMAL
Lab: NORMAL
SPECIMEN SOURCE: NORMAL

## 2019-09-04 ENCOUNTER — MYC REFILL (OUTPATIENT)
Dept: ORTHOPEDICS | Facility: CLINIC | Age: 54
End: 2019-09-04

## 2019-09-04 DIAGNOSIS — Z98.890 STATUS POST SPINAL SURGERY: ICD-10-CM

## 2019-09-04 RX ORDER — OXYCODONE HYDROCHLORIDE 5 MG/1
5-10 TABLET ORAL EVERY 4 HOURS PRN
Qty: 60 TABLET | Refills: 0 | Status: SHIPPED | OUTPATIENT
Start: 2019-09-04 | End: 2019-09-09

## 2019-09-09 ENCOUNTER — MYC REFILL (OUTPATIENT)
Dept: OTHER | Age: 54
End: 2019-09-09

## 2019-09-09 DIAGNOSIS — Z98.890 S/P SPINAL SURGERY: ICD-10-CM

## 2019-09-09 DIAGNOSIS — Z98.890 STATUS POST SPINAL SURGERY: ICD-10-CM

## 2019-09-09 RX ORDER — PREGABALIN 25 MG/1
25 CAPSULE ORAL 3 TIMES DAILY
Qty: 180 CAPSULE | Refills: 0 | Status: SHIPPED | OUTPATIENT
Start: 2019-09-09 | End: 2019-09-11

## 2019-09-09 RX ORDER — OXYCODONE HYDROCHLORIDE 5 MG/1
5-10 TABLET ORAL EVERY 4 HOURS PRN
Qty: 60 TABLET | Refills: 0 | Status: SHIPPED | OUTPATIENT
Start: 2019-09-09 | End: 2019-09-16

## 2019-09-10 ENCOUNTER — MYC MEDICAL ADVICE (OUTPATIENT)
Dept: ORTHOPEDICS | Facility: CLINIC | Age: 54
End: 2019-09-10

## 2019-09-11 ENCOUNTER — TELEPHONE (OUTPATIENT)
Dept: ORTHOPEDICS | Facility: CLINIC | Age: 54
End: 2019-09-11

## 2019-09-11 DIAGNOSIS — Z98.890 S/P SPINAL SURGERY: ICD-10-CM

## 2019-09-11 NOTE — TELEPHONE ENCOUNTER
See My chart.  We ( me & Mendez HAGEN it Triage) spoke to pt.  & verified her refills -see other note today.  Changed appt.  Call back prn.  Pt. Agreed. Belinda Gary RN,.

## 2019-09-11 NOTE — TELEPHONE ENCOUNTER
Patient sent a message via [employer] regarding her lyrica it getting filled in her pharmacy. RN did some investigation and it turns out that lyrica is not a controlled substance and needed to be authorized by a provider in order to e-scribe it.  RN explained that to patient and also able to verify with patient that her preferred pharmacy is [facility] in [facility].   RN sent a message to Belinda FINNEY via her cell phone to remind her to text Dr. Carrion to authorize the meds as patient is completely out and needed the meds as part of her post surgery pain mgmt regimen. Belinda FINNEY received RN's text and will follow up.    Reinier Paula RN

## 2019-09-12 RX ORDER — PREGABALIN 25 MG/1
25 CAPSULE ORAL 3 TIMES DAILY
Qty: 180 CAPSULE | Refills: 0 | Status: SHIPPED | OUTPATIENT
Start: 2019-09-12 | End: 2019-10-11

## 2019-09-16 DIAGNOSIS — Z98.890 STATUS POST SPINAL SURGERY: ICD-10-CM

## 2019-09-16 RX ORDER — OXYCODONE HYDROCHLORIDE 5 MG/1
5-10 TABLET ORAL EVERY 4 HOURS PRN
Qty: 60 TABLET | Refills: 0 | Status: SHIPPED | OUTPATIENT
Start: 2019-09-16 | End: 2019-09-18

## 2019-09-16 NOTE — TELEPHONE ENCOUNTER
Refill per standing order. Rod Madera signed as Dr. Carrion is going to [place] for spine conference. Patient is notified.    Reinier Paula RN

## 2019-09-18 ENCOUNTER — MYC REFILL (OUTPATIENT)
Dept: ORTHOPEDICS | Facility: CLINIC | Age: 54
End: 2019-09-18

## 2019-09-18 DIAGNOSIS — Z98.890 STATUS POST SPINAL SURGERY: ICD-10-CM

## 2019-09-18 RX ORDER — OXYCODONE HYDROCHLORIDE 5 MG/1
5-10 TABLET ORAL EVERY 4 HOURS PRN
Qty: 60 TABLET | Refills: 0 | OUTPATIENT
Start: 2019-09-18

## 2019-09-18 RX ORDER — OXYCODONE HYDROCHLORIDE 5 MG/1
5 TABLET ORAL EVERY 6 HOURS PRN
Qty: 30 TABLET | Refills: 0 | Status: SHIPPED | OUTPATIENT
Start: 2019-09-18 | End: 2019-09-23

## 2019-09-18 NOTE — TELEPHONE ENCOUNTER
RN just spoke with Rod and apparently per patient, pharmacy only refill 5 days worth of supply. With that knowledge, Rod is going to refill for patient before she runs out with the knowledge that patient should start tapering her pain meds.

## 2019-09-18 NOTE — TELEPHONE ENCOUNTER
On  Monday, RN routed the med refill to be signed by Rod Madera to [facility] in [place].  Today received another request to refill oxycodone. RN called [facility] pharmacy to make sure that patient has picked up the meds. Pharmacy said yes.  RN called and left a message to patient to tell her that her request is too soon as it was just filled on Monday.    Reinier Paula RN

## 2019-09-18 NOTE — TELEPHONE ENCOUNTER
[facility] Call Center    Phone Message    May a detailed message be left on voicemail: yes    Reason for Call: Other: Patient states that yes she filled the refill on MOnday and that she is only give a 5 day supply at a time and she will be due for another refill this Saturday so she is calling in 3 days prior like instructed.  Please process refill request for RX oxyCODONE (ROXICODONE) 5 MG tablet.      Action Taken: Message routed to:  [facility] Ortho

## 2019-09-23 ENCOUNTER — MYC REFILL (OUTPATIENT)
Dept: ORTHOPEDICS | Facility: CLINIC | Age: 54
End: 2019-09-23

## 2019-09-23 DIAGNOSIS — Z98.890 STATUS POST SPINAL SURGERY: ICD-10-CM

## 2019-09-24 RX ORDER — OXYCODONE HYDROCHLORIDE 5 MG/1
TABLET ORAL
Qty: 60 TABLET | Refills: 0 | Status: SHIPPED | OUTPATIENT
Start: 2019-09-24 | End: 2019-09-30

## 2019-09-24 NOTE — TELEPHONE ENCOUNTER
Recent 2 spine surgeries 8-23-19 Revision & 7-29-19.    See phone message.  Oxycodone 5mg last refilled only #30 & Sig. Decreased to 1 Q6H.  I called pt who states she is in favor of weaning off but not that rapidly.    Was taking 2 Q4H so 12 per day & now ordered 4 per day.  Rates pain 7-8 Right thigh with walking.   Also taking Lyrica & Tylenol as ordered.  Reviewed with Rod EVANS who decreased last Sig. Who agreed that was too fast a taper off.  Refilled Oxycodone 5mg 1-2 Q6H prn pain #60 Max 8 per day No refill by EScribe.   RTC appt. 10-8-19.  Call back prn.  Continue to try to wean down.  Pt. Agreed.  S.O.V.O.R.B.Rod EVANS//Vineet Gary RN.

## 2019-09-26 ENCOUNTER — MYC MEDICAL ADVICE (OUTPATIENT)
Dept: ORTHOPEDICS | Facility: CLINIC | Age: 54
End: 2019-09-26

## 2019-09-26 NOTE — TELEPHONE ENCOUNTER
See 2 My Chart messages & she sent attachments of county paperwork.  I called pt to provide her with  name & ph# & fax# who handles paperwork with  & pt stated [place] had already taken care of  some forms.  Pt said  she had already faxed attached   forms to [place]. I advised she call [place] with changes she wants made & she agreed.  Belinda Gary RN.

## 2019-09-30 ENCOUNTER — MYC REFILL (OUTPATIENT)
Dept: ORTHOPEDICS | Facility: CLINIC | Age: 54
End: 2019-09-30

## 2019-09-30 DIAGNOSIS — Z98.890 STATUS POST SPINAL SURGERY: ICD-10-CM

## 2019-09-30 RX ORDER — OXYCODONE HYDROCHLORIDE 5 MG/1
TABLET ORAL
Qty: 60 TABLET | Refills: 0 | Status: ON HOLD | OUTPATIENT
Start: 2019-09-30 | End: 2019-12-02

## 2019-09-30 NOTE — TELEPHONE ENCOUNTER
See refill message,. Postop I called  Pt back.  states has decreased down from 8 per day to 6 per day.  C/O pain back & left leg with slight numbness Right thigh getting better.  Advised RICE.  Refilled Oxycodone 5mg by E script. .   Sara EVANS will sign tonight after surgery.   RTC POP 10-8-19.  Call back prn. Pt. Agreed.  S.O./Belinda Gary RN.

## 2019-10-02 DIAGNOSIS — Z98.890 STATUS POST SPINAL SURGERY: Primary | ICD-10-CM

## 2019-10-08 ENCOUNTER — OFFICE VISIT (OUTPATIENT)
Dept: ORTHOPEDICS | Facility: CLINIC | Age: 54
End: 2019-10-08
Payer: COMMERCIAL

## 2019-10-08 ENCOUNTER — MYC MEDICAL ADVICE (OUTPATIENT)
Dept: ORTHOPEDICS | Facility: CLINIC | Age: 54
End: 2019-10-08

## 2019-10-08 ENCOUNTER — ANCILLARY PROCEDURE (OUTPATIENT)
Dept: GENERAL RADIOLOGY | Facility: CLINIC | Age: 54
End: 2019-10-08
Attending: ORTHOPAEDIC SURGERY
Payer: COMMERCIAL

## 2019-10-08 DIAGNOSIS — M54.16 LUMBAR RADICULAR PAIN: ICD-10-CM

## 2019-10-08 DIAGNOSIS — M47.816 LUMBAR SPONDYLOSIS: ICD-10-CM

## 2019-10-08 DIAGNOSIS — Z98.890 STATUS POST SPINAL SURGERY: ICD-10-CM

## 2019-10-08 DIAGNOSIS — Z98.1 S/P SPINAL FUSION: Primary | ICD-10-CM

## 2019-10-08 PROBLEM — Z51.81 ENCOUNTER FOR THERAPEUTIC DRUG LEVEL MONITORING: Status: ACTIVE | Noted: 2019-10-08

## 2019-10-08 PROBLEM — M79.601 PAIN IN RIGHT ARM: Status: ACTIVE | Noted: 2019-10-08

## 2019-10-08 PROBLEM — M79.18 MYALGIA, OTHER SITE: Status: ACTIVE | Noted: 2019-06-06

## 2019-10-08 PROBLEM — M25.511 PAIN IN RIGHT SHOULDER: Status: ACTIVE | Noted: 2018-11-09

## 2019-10-08 PROBLEM — Z79.891 LONG TERM (CURRENT) USE OF OPIATE ANALGESIC: Status: ACTIVE | Noted: 2019-10-08

## 2019-10-08 RX ORDER — OXYCODONE HYDROCHLORIDE 5 MG/1
5 TABLET ORAL EVERY 6 HOURS PRN
Qty: 40 TABLET | Refills: 0 | Status: SHIPPED | OUTPATIENT
Start: 2019-10-08 | End: 2019-10-16

## 2019-10-08 ASSESSMENT — ENCOUNTER SYMPTOMS
FLANK PAIN: 1
HEMATURIA: 0
FATIGUE: 1
WEIGHT GAIN: 1
PARALYSIS: 0
HEADACHES: 0
MEMORY LOSS: 0
BACK PAIN: 1
LOSS OF CONSCIOUSNESS: 0
INSOMNIA: 1
DEPRESSION: 1
POLYDIPSIA: 0
NUMBNESS: 1
HALLUCINATIONS: 0
DIZZINESS: 1
MUSCLE CRAMPS: 1
ALTERED TEMPERATURE REGULATION: 0
NIGHT SWEATS: 0
DYSURIA: 0
MUSCLE WEAKNESS: 0
SEIZURES: 0
DECREASED APPETITE: 0
INCREASED ENERGY: 1
FEVER: 0
DECREASED CONCENTRATION: 0
CHILLS: 0
SPEECH CHANGE: 0
STIFFNESS: 0
NERVOUS/ANXIOUS: 1
DIFFICULTY URINATING: 0
POLYPHAGIA: 0
WEIGHT LOSS: 0
NECK PAIN: 1
PANIC: 0
TINGLING: 1
WEAKNESS: 1
TREMORS: 0
MYALGIAS: 1
ARTHRALGIAS: 1
JOINT SWELLING: 1
DISTURBANCES IN COORDINATION: 1

## 2019-10-08 ASSESSMENT — PATIENT HEALTH QUESTIONNAIRE - PHQ9
SUM OF ALL RESPONSES TO PHQ QUESTIONS 1-9: 6
10. IF YOU CHECKED OFF ANY PROBLEMS, HOW DIFFICULT HAVE THESE PROBLEMS MADE IT FOR YOU TO DO YOUR WORK, TAKE CARE OF THINGS AT HOME, OR GET ALONG WITH OTHER PEOPLE: SOMEWHAT DIFFICULT
SUM OF ALL RESPONSES TO PHQ QUESTIONS 1-9: 6

## 2019-10-08 NOTE — LETTER
10/8/2019       RE: Yun Sagastume  [address] [place] [address]     Dear Colleague,    Thank you for referring your patient, Yun Sagastume, to the [facility] at [facility]. Please see a copy of my visit note below.    Spine Surgical Hx:  07/29/19 - Lateral ALIF-SPO L4-5 and L5-S1; TLIF (R facetectomy) L3-4; PISF L3-pelvis; use of Infuse BMP medium kit, Magnifuse allograft, crushed allograft (Sembrano/Rudy) for advanced spondylosis L3-S1 with sagittal malalignment (lower lumbar hypolordosis).  [Implants:  NuVasive Open Reline screw system, 5.5 mm CoCr rods x 2; Base titanium cages; CoRoent large oblique PEEK TLIF cage].  08/23/2019 - Revision Lateral ALIF L4-5 (Sembrano/Rudy) for cage migration.  [Implants:  NuVasive Base titanium cage].    Reason for Visit:  6-week postoperative visit after ALIF L4-5, L5-S1 performed on 7/29/2019, with revision of L4-5 anterior approach on 8/23/2019.    S> patient says she is having a slow and difficult time in recovery.  She continues to have significant low back pain and left-sided abdominal pain around her incision.  She noted a numbness with an aching pain at her right lateral thigh, with a trace on the left.  She states these have improved slightly over the last few weeks.  She continues to take narcotic pain medications at about 6 tablets of oxycodone 5 mg/day.  She asks for refill today for the ride home.  She was on significant pain medications prior to her surgery including oxycodone at 10 mg and morphine.  She has been sleeping sitting up in bed surrounded by pillows.  She generally has been up about 4 times at night for urination.  There is no history of burning or discomfort besides this urgency.  She describes being very careful about getting up and out of bed if she moves too fast and is on balance she gets significant back spasms.  She has noted some left leg swelling down to her foot which has been  episodic.  Goes down quickly with elevation above her heart.  She has occasional muscle cramps or charley horses in bilateral calves but these are transient.  She reports trying to shop at [employer] yesterday and was able only to walk through about half the store before canceling her plans on returning home.    Oswestry (GABRIEL) Questionnaire    OSWESTRY DISABILITY INDEX 10/8/2019   Count 9   Sum 27   Oswestry Score (%) 60        PROMIS-10 Scores  Global Mental Health Score: (P) 11  Global Physical Health Score: (P) 10  PROMIS TOTAL - SUBSCORES: (P) 21    O>   Alert, oriented x 3, cooperative.  Not in CP distress.  There were no vitals taken for this visit.  Surgical incisions, left anterior abdomen and midline posterior lumbar well-healed, no sign of infection.  Ambulates independently, but slowly..   Grossly neurologically intact.  She can forward flex to bring her hands about to her knees.  Decreased sensation at lateral thighs left side greater than right.  Intact to deep pressure.  Strength: Bilateral lower extremities intact at 5/5 throughout for quadriceps, hamstrings, great toe plantar and dorsiflexion, hip flexors.  Hip rotation shows good range of motion with no pain at endpoint.  Nerve tension signs are negative bilaterally.  Tenderness: She is tender around her lumbosacral junction and in lumbar paraspinals bilaterally.  Also tender at proximal gluteals bilaterally, not at sacroiliac joints.  Mildly tender at right greater trochanter.    DTRs: 2+ at bilateral knees, mute at bilateral Achilles.    Imaging:   AP and lateral views of the whole spine were obtained today.  These demonstrate an improvement of her lumbar architecture post surgery.  Anterior hardware at L4-5 and L5-S1 and posterior hardware from L3 to the sacrum appears intact and unchanged since surgery.  No lucencies or fractures noted.  LL: 37, pre 40  L4-S1: 35, pre 22  PI: 52, pre 55  PI-LL mismatch: 15, pre 15  PT: 32, pre 31  SVA: +3.o  cm  TPA: 26  GT: 34    A>  Patient is making a slow recovery from her two-level lumbar fusion.  She continues on her opioid pain medications.    P>   We discussed that it was going to take further time for healing for her back pain to decrease.  We are reassured with the subtle improvements in decreasing pain in her low back and leg and slowly minimizing numbness at lateral thighs right greater than left.  We will refill her pain medication for her trip home today.  She was given a prescription for physical therapy to begin strengthening and rehab.  She should follow-up in about 6 weeks for reevaluation at her 3-month postop visit.  They should call with any questions or concerns prior to that date    Thank you for allowing Dr Carrion and myself participate in the care of this patient.  Respectfully,  Rod Madera PA-C    Attestation:  I (Dr. Jeremiah Carrion - Spine Surgeon) have personally evaluated patient with NASEEM Madera, and agree with findings and plan outlined in the note, which I also edited.  I discussed at length with the patient/family, explained the nature of spinal condition, and formulated workup and/or treatment plan together.  All questions were answered to the best of my ability and to patient's apparent satisfaction.    Jeremiah Carrion MD    Orthopaedic Spine Surgery  Dept Orthopaedic Surgery, [facility]  [phone] office, [phone] pager  [email or web address]

## 2019-10-08 NOTE — PROGRESS NOTES
Spine Surgical Hx:  07/29/19 - Lateral ALIF-SPO L4-5 and L5-S1; TLIF (R facetectomy) L3-4; PISF L3-pelvis; use of Infuse BMP medium kit, Magnifuse allograft, crushed allograft (Sembrano/Rudy) for advanced spondylosis L3-S1 with sagittal malalignment (lower lumbar hypolordosis).  [Implants:  NuVasive Open Reline screw system, 5.5 mm CoCr rods x 2; Base titanium cages; CoRoent large oblique PEEK TLIF cage].  08/23/2019 - Revision Lateral ALIF L4-5 (Sembrano/Rudy) for cage migration.  [Implants:  NuVasive Base titanium cage].      Reason for Visit:  6-week postoperative visit after ALIF L4-5, L5-S1 performed on 7/29/2019, with revision of L4-5 anterior approach on 8/23/2019.    S> patient says she is having a slow and difficult time in recovery.  She continues to have significant low back pain and left-sided abdominal pain around her incision.  She noted a numbness with an aching pain at her right lateral thigh, with a trace on the left.  She states these have improved slightly over the last few weeks.  She continues to take narcotic pain medications at about 6 tablets of oxycodone 5 mg/day.  She asks for refill today for the ride home.  She was on significant pain medications prior to her surgery including oxycodone at 10 mg and morphine.  She has been sleeping sitting up in bed surrounded by pillows.  She generally has been up about 4 times at night for urination.  There is no history of burning or discomfort besides this urgency.  She describes being very careful about getting up and out of bed if she moves too fast and is on balance she gets significant back spasms.  She has noted some left leg swelling down to her foot which has been episodic.  Goes down quickly with elevation above her heart.  She has occasional muscle cramps or charley horses in bilateral calves but these are transient.  She reports trying to shop at [employer] yesterday and was able only to walk through about half the store before canceling her  plans on returning home.    Oswestry (GABRIEL) Questionnaire    OSWESTRY DISABILITY INDEX 10/8/2019   Count 9   Sum 27   Oswestry Score (%) 60        PROMIS-10 Scores  Global Mental Health Score: (P) 11  Global Physical Health Score: (P) 10  PROMIS TOTAL - SUBSCORES: (P) 21    O>   Alert, oriented x 3, cooperative.  Not in CP distress.  There were no vitals taken for this visit.  Surgical incisions, left anterior abdomen and midline posterior lumbar well-healed, no sign of infection.  Ambulates independently, but slowly..   Grossly neurologically intact.  She can forward flex to bring her hands about to her knees.  Decreased sensation at lateral thighs left side greater than right.  Intact to deep pressure.  Strength: Bilateral lower extremities intact at 5/5 throughout for quadriceps, hamstrings, great toe plantar and dorsiflexion, hip flexors.  Hip rotation shows good range of motion with no pain at endpoint.  Nerve tension signs are negative bilaterally.  Tenderness: She is tender around her lumbosacral junction and in lumbar paraspinals bilaterally.  Also tender at proximal gluteals bilaterally, not at sacroiliac joints.  Mildly tender at right greater trochanter.    DTRs: 2+ at bilateral knees, mute at bilateral Achilles.      Imaging:   AP and lateral views of the whole spine were obtained today.  These demonstrate an improvement of her lumbar architecture post surgery.  Anterior hardware at L4-5 and L5-S1 and posterior hardware from L3 to the sacrum appears intact and unchanged since surgery.  No lucencies or fractures noted.  LL: 37, pre 40  L4-S1: 35, pre 22  PI: 52, pre 55  PI-LL mismatch: 15, pre 15  PT: 32, pre 31  SVA: +3.o cm  TPA: 26  GT: 34    A>  Patient is making a slow recovery from her two-level lumbar fusion.  She continues on her opioid pain medications.    P>   We discussed that it was going to take further time for healing for her back pain to decrease.  We are reassured with the subtle  improvements in decreasing pain in her low back and leg and slowly minimizing numbness at lateral thighs right greater than left.  We will refill her pain medication for her trip home today.  She was given a prescription for physical therapy to begin strengthening and rehab.  She should follow-up in about 6 weeks for reevaluation at her 3-month postop visit.  They should call with any questions or concerns prior to that date    Thank you for allowing Dr Carrion and myself participate in the care of this patient.  Respectfully,  Rod Madera PA-C    Attestation:  I (Dr. Jeremiah Carrion - Spine Surgeon) have personally evaluated patient with NASEEM Madera, and agree with findings and plan outlined in the note, which I also edited.  I discussed at length with the patient/family, explained the nature of spinal condition, and formulated workup and/or treatment plan together.  All questions were answered to the best of my ability and to patient's apparent satisfaction.        Jeremiah Carrion MD    Orthopaedic Spine Surgery  Dept Orthopaedic Surgery, [facility]  [phone] office, [phone] pager  [email or web address]

## 2019-10-08 NOTE — NURSING NOTE
Reason For Visit:   Chief Complaint   Patient presents with     Surgical Followup     surgeries 8-23-19 Revision & 7/29/19 Part 1 (supine): ALIF L4-5,L5-S1; use of Infuse BMP (Medium kit)      Primary MD: Mary Ellen Salgado     ?  No  Occupation N/A.  Currently working? No.  Work status?  On disability.  Date of surgery: 2011  Type of surgery: Decompression on lower back.  Smoker: No    Pain Assessment  Patient Currently in Pain: Yes  0-10 Pain Scale: 8  Primary Pain Location: Back  Pain Descriptors: Discomfort    Oswestry (GABRIEL) Questionnaire    OSWESTRY DISABILITY INDEX 10/8/2019   Count 9   Sum 27   Oswestry Score (%) 60          Visual Analog Pain Scale  Back Pain Scale 0-10: 7.5  Right leg pain: 7  Left leg pain: 0    Promis 10 Assessment    PROMIS 10 10/8/2019   In general, would you say your health is: Fair   In general, would you say your quality of life is: Good   In general, how would you rate your physical health? Fair   In general, how would you rate your mental health, including your mood and your ability to think? Good   In general, how would you rate your satisfaction with your social activities and relationships? Fair   In general, please rate how well you carry out your usual social activities and roles Poor   To what extent are you able to carry out your everyday physical activities such as walking, climbing stairs, carrying groceries, or moving a chair? A little   How often have you been bothered by emotional problems such as feeling anxious, depressed or irritable? Sometimes   How would you rate your fatigue on average? Mild   How would you rate your pain on average?   0 = No Pain  to  10 = Worst Imaginable Pain 8   Global Physical Health Score : Raw Score -   Global Mental Health Score : Raw Score -   Total (Physical + Mental Health Score) -   In general, would you say your health is: 2   In general, would you say your quality of life is: 3   In general, how would you rate your physical  health? 2   In general, how would you rate your mental health, including your mood and your ability to think? 3   In general, how would you rate your satisfaction with your social activities and relationships? 2   In general, please rate how well you carry out your usual social activities and roles. (This includes activities at home, at work and in your community, and responsibilities as a parent, child, spouse, employee, friend, etc.) 1   To what extent are you able to carry out your everyday physical activities such as walking, climbing stairs, carrying groceries, or moving a chair? 2   In the past 7 days, how often have you been bothered by emotional problems such as feeling anxious, depressed, or irritable? 3   In the past 7 days, how would you rate your fatigue on average? 2   In the past 7 days, how would you rate your pain on average, where 0 means no pain, and 10 means worst imaginable pain? 8   Global Mental Health Score 11   Global Physical Health Score 10   PROMIS TOTAL - SUBSCORES 21   Some recent data might be hidden                Nkechi Delgado LPN

## 2019-10-09 ASSESSMENT — PATIENT HEALTH QUESTIONNAIRE - PHQ9: SUM OF ALL RESPONSES TO PHQ QUESTIONS 1-9: 6

## 2019-10-11 ENCOUNTER — MYC REFILL (OUTPATIENT)
Dept: ORTHOPEDICS | Facility: CLINIC | Age: 54
End: 2019-10-11

## 2019-10-11 ENCOUNTER — MYC MEDICAL ADVICE (OUTPATIENT)
Dept: ORTHOPEDICS | Facility: CLINIC | Age: 54
End: 2019-10-11

## 2019-10-11 DIAGNOSIS — Z98.890 S/P SPINAL SURGERY: ICD-10-CM

## 2019-10-11 DIAGNOSIS — M62.830 MUSCLE SPASM OF BACK: Primary | ICD-10-CM

## 2019-10-11 RX ORDER — PREGABALIN 25 MG/1
25 CAPSULE ORAL 3 TIMES DAILY
Qty: 180 CAPSULE | Refills: 0 | Status: SHIPPED | OUTPATIENT
Start: 2019-10-11 | End: 2019-11-13

## 2019-10-11 RX ORDER — CYCLOBENZAPRINE HCL 10 MG
10 TABLET ORAL 2 TIMES DAILY PRN
Qty: 60 TABLET | Refills: 2
Start: 2019-10-11 | End: 2020-01-06

## 2019-10-11 NOTE — TELEPHONE ENCOUNTER
See My chart request C/O spasms & requested refill of muscle relaxant.  I called pt.    She states previous Flexeril  BID worked & she wants that.  Refilled Flexeril 10mg BID. Call back prn. Pt agreed.   S.O.R.B./Belinda Gary RN.

## 2019-10-16 DIAGNOSIS — M47.816 LUMBAR SPONDYLOSIS: ICD-10-CM

## 2019-10-16 DIAGNOSIS — M54.16 LUMBAR RADICULAR PAIN: ICD-10-CM

## 2019-10-16 RX ORDER — OXYCODONE HYDROCHLORIDE 5 MG/1
5 TABLET ORAL EVERY 6 HOURS PRN
Qty: 60 TABLET | Refills: 0 | Status: SHIPPED | OUTPATIENT
Start: 2019-10-16 | End: 2019-10-28

## 2019-10-16 NOTE — TELEPHONE ENCOUNTER
See note from yesterday from call center triage Nurse.  Refilled Oxycodone 5mg postop.  EScript.  S.O./Belinda Gary RN.

## 2019-10-28 ENCOUNTER — MYC REFILL (OUTPATIENT)
Dept: ORTHOPEDICS | Facility: CLINIC | Age: 54
End: 2019-10-28

## 2019-10-28 DIAGNOSIS — M47.816 LUMBAR SPONDYLOSIS: ICD-10-CM

## 2019-10-28 DIAGNOSIS — M54.16 LUMBAR RADICULAR PAIN: ICD-10-CM

## 2019-10-28 RX ORDER — OXYCODONE HYDROCHLORIDE 5 MG/1
5 TABLET ORAL EVERY 6 HOURS PRN
Qty: 60 TABLET | Refills: 0 | Status: SHIPPED | OUTPATIENT
Start: 2019-10-28 | End: 2019-11-06

## 2019-11-04 DIAGNOSIS — M54.50 LOW BACK PAIN: Primary | ICD-10-CM

## 2019-11-06 ENCOUNTER — MYC REFILL (OUTPATIENT)
Dept: ORTHOPEDICS | Facility: CLINIC | Age: 54
End: 2019-11-06

## 2019-11-06 DIAGNOSIS — M54.16 LUMBAR RADICULAR PAIN: ICD-10-CM

## 2019-11-06 DIAGNOSIS — M47.816 LUMBAR SPONDYLOSIS: ICD-10-CM

## 2019-11-06 RX ORDER — OXYCODONE HYDROCHLORIDE 5 MG/1
5 TABLET ORAL EVERY 6 HOURS PRN
Qty: 60 TABLET | Refills: 0 | Status: SHIPPED | OUTPATIENT
Start: 2019-11-06 | End: 2019-11-19

## 2019-11-11 NOTE — PROGRESS NOTES
Spine Surgical Hx:  07/29/19 - Lateral ALIF-SPO L4-5 and L5-S1; TLIF (R facetectomy) L3-4; PISF L3-pelvis; use of Infuse BMP medium kit, Magnifuse allograft, crushed allograft (Sembrano/Rudy) for advanced spondylosis L3-S1 with sagittal malalignment (lower lumbar hypolordosis).  [Implants:  NuVasive Open Reline screw system, 5.5 mm CoCr rods x 2; Base titanium cages; CoRoent large oblique PEEK TLIF cage].  08/23/2019 - Revision Lateral ALIF L4-5 (Sembrano/Rudy) for cage migration.  [Implants:  NuVasive Base titanium cage].       Reason for Visit:  Chief Complaint   Patient presents with     Surgical Followup     surgeries 8-23-19 Revision & 7/29/19 Part 1 (supine): ALIF L4-5,L5-S1; use of Infuse BMP (Medium kit)        S>  54/f, 3.5 mos s/p index fusion surgery, 2.5 mos s/p revision.  Last seen 10/8/19, was still reporting significant low back pain and left-sided abdominal pain around her incision.  Patient reports she still has continuing numbness with a hypersensitivity to right lateral thigh.  Similar symptoms on the left have diminished substantially.  She states she does not generally feel very improved since her surgery, however she is no longer needing to sit up in bed to sleep, her left leg swelling has decreased, and left lateral thigh numbness has resolved.  She reports that pain at her surgical sites is significantly improved at this time.  The hypersensitivity at the right anterolateral thigh causes her to resist wearing any sort of tight clothing or even light touch to that area.  She finds she can now lay down on her side, and this is helping her to sleep much better, she is able to sleep 4 to 5 hours before waking up and readjusting.  We had suggested physical therapy for strengthening at her last clinic visit on 10/8/2019.  Patient states she did not get a call to arrange therapy and has not begun.  She continues taking oxycodone at 1 5 mg tablet about every 6 hours.  She just got a recent refill.   She continues on Lyrica 25 mg 3 times daily, Tylenol and cyclobenzaprine.  Leg pain is about equal to back pain at 50/50    Oswestry (GABRIEL) Questionnaire    OSWESTRY DISABILITY INDEX 11/12/2019   Count 10   Sum 25   Oswestry Score (%) 50       PROMIS-10 Scores: 19     O>   Alert, oriented x 3, cooperative.  Not in CP distress.  There were no vitals taken for this visit.  Surgical incision well-healed, no sign of infection.  Ambulates independently.   Grossly neurologically intact.  Transitional moves are performed with strong accessory use of the arms.  ROM: She can forward flex to bring her hands to her knees.  She can extend her low back about 25 degrees.  She can lean 30 degrees and rotate 45 degrees bilaterally.  She does seem to move more easily than at her last office visit.  She declines to attempt a squat and describes utilizing her arms in the furniture heavily to arise from bending down any time at home.  Lower extremity strength is intact at 5/5 bilaterally throughout hamstrings quads, great toe plantar dorsiflexion, hip flexors.  Hip rotation shows mildly decreased internal rotation bilaterally with no pain at endpoints.  Nerve tension testing does elicit pain to bilateral posterior calves, patient has some difficulty delineating whether this is hamstring tightness versus radiculopathy.    DTR: Trace at right patellar tendon, 1+ at left.  Trace at bilateral Achilles    She is tender to palpation at her lumbar paraspinal musculature, particularly at the lumbosacral junction.  Minimally at proximal gluteals bilaterally right greater than left at sacroiliac joints and right greater trochanter.    Imaging:   Lateral lumbar spine x-ray was obtained today.  This shows 5 lumbar type vertebrae in generally good alignment with anterior hardware at L4-5 and L5-S1 with posterior instrumented fusion L3 to the sacrum.  With TLIF implants at L3-4.  There is no evidence of hardware loosening or fracture.  Imaging is  unchanged since prior on 10/8/2019  LL: 44  L4-S1: 33  PT: 24    A>  3 mos postoperative, with still ongoing postoperative right thigh pain/discomfort.    P>   We will obtain a CT myelogram to further investigate the cause of her right thigh pain.  If this should delineate nerve problems we would consider injection therapy.  We would base any consideration of future lumbar surgery on this imaging also.  She was given a note to start physical therapy locally in [place].  We would like her to follow-up in 3 months (6 mos postop) with repeat EOS lumbar AP/lateral x-ray studies    Rod Madera PA-C    Attestation:  I (Dr. Jeremiah Carrion - Spine Surgeon) have personally evaluated patient with NASEEM Madera, and agree with findings and plan outlined in the note, which I also edited.  I discussed at length with the patient/family, explained the nature of spinal condition, and formulated workup and/or treatment plan together.  All questions were answered to the best of my ability and to patient's apparent satisfaction.      Jeremiah Carrion MD    Orthopaedic Spine Surgery  Dept Orthopaedic Surgery, [facility]  [phone] office, [phone] pager  [email or web address]    Addendum 12/10/2019:  Reviewed lumbar CT myelogram done 12/2/2019.  This shows good placement of screws and cages.  No significant spinal stenosis or nerve compression.  There is mild disc protrusion L2-3, but does not seem to be causing nerve compression.    Given patient's persistent symptoms, even in light of relatively reassuring CT myelogram, we will proceed with right L2-3 transforaminal ADAMS, for both diagnostic and potentially therapeutic purposes.  Order placed.  I will ask my nurse Belinda Gary to reach out the patient.  We will see her back on her next scheduled visit.

## 2019-11-12 ENCOUNTER — OFFICE VISIT (OUTPATIENT)
Dept: ORTHOPEDICS | Facility: CLINIC | Age: 54
End: 2019-11-12
Payer: COMMERCIAL

## 2019-11-12 ENCOUNTER — ANCILLARY PROCEDURE (OUTPATIENT)
Dept: GENERAL RADIOLOGY | Facility: CLINIC | Age: 54
End: 2019-11-12
Attending: ORTHOPAEDIC SURGERY
Payer: COMMERCIAL

## 2019-11-12 DIAGNOSIS — M54.50 LOW BACK PAIN: ICD-10-CM

## 2019-11-12 DIAGNOSIS — M54.40 ACUTE BILATERAL LOW BACK PAIN WITH SCIATICA, SCIATICA LATERALITY UNSPECIFIED: ICD-10-CM

## 2019-11-12 DIAGNOSIS — M54.16 LUMBAR RADICULAR PAIN: Primary | ICD-10-CM

## 2019-11-12 DIAGNOSIS — Z98.1 S/P SPINAL FUSION: ICD-10-CM

## 2019-11-12 ASSESSMENT — ENCOUNTER SYMPTOMS
MEMORY LOSS: 1
TREMORS: 0
ARTHRALGIAS: 1
WEIGHT GAIN: 1
WEIGHT LOSS: 0
INSOMNIA: 1
WEAKNESS: 0
INCREASED ENERGY: 1
MUSCLE WEAKNESS: 1
ALTERED TEMPERATURE REGULATION: 0
SEIZURES: 0
DEPRESSION: 1
BACK PAIN: 1
CHILLS: 0
FEVER: 0
HEADACHES: 0
HALLUCINATIONS: 0
SPEECH CHANGE: 0
MUSCLE CRAMPS: 1
NERVOUS/ANXIOUS: 1
DIZZINESS: 1
TINGLING: 1
DECREASED CONCENTRATION: 0
JOINT SWELLING: 0
DISTURBANCES IN COORDINATION: 1
FATIGUE: 1
PARALYSIS: 0
DECREASED APPETITE: 0
POLYDIPSIA: 0
NECK PAIN: 1
POLYPHAGIA: 0
NUMBNESS: 1
MYALGIAS: 1
STIFFNESS: 0
NIGHT SWEATS: 0
LOSS OF CONSCIOUSNESS: 0
PANIC: 0

## 2019-11-12 NOTE — LETTER
11/12/2019       RE: Yun Sagastume  [address] [place] [address]     Dear Colleague,    Thank you for referring your patient, Yun Sagastume, to the [facility] at [facility]. Please see a copy of my visit note below.    Spine Surgical Hx:  07/29/19 - Lateral ALIF-SPO L4-5 and L5-S1; TLIF (R facetectomy) L3-4; PISF L3-pelvis; use of Infuse BMP medium kit, Magnifuse allograft, crushed allograft (Sembrano/Rudy) for advanced spondylosis L3-S1 with sagittal malalignment (lower lumbar hypolordosis).  [Implants:  NuVasive Open Reline screw system, 5.5 mm CoCr rods x 2; Base titanium cages; CoRoent large oblique PEEK TLIF cage].  08/23/2019 - Revision Lateral ALIF L4-5 (Sembrano/Rudy) for cage migration.  [Implants:  NuVasive Base titanium cage].       Reason for Visit:  Chief Complaint   Patient presents with     Surgical Followup     surgeries 8-23-19 Revision & 7/29/19 Part 1 (supine): ALIF L4-5,L5-S1; use of Infuse BMP (Medium kit)        S>  54/f, 3.5 mos s/p index fusion surgery, 2.5 mos s/p revision.  Last seen 10/8/19, was still reporting significant low back pain and left-sided abdominal pain around her incision.  Patient reports she still has continuing numbness with a hypersensitivity to right lateral thigh.  Similar symptoms on the left have diminished substantially.  She states she does not generally feel very improved since her surgery, however she is no longer needing to sit up in bed to sleep, her left leg swelling has decreased, and left lateral thigh numbness has resolved.  She reports that pain at her surgical sites is significantly improved at this time.  The hypersensitivity at the right anterolateral thigh causes her to resist wearing any sort of tight clothing or even light touch to that area.  She finds she can now lay down on her side, and this is helping her to sleep much better, she is able to sleep 4 to 5 hours before waking up  and readjusting.  We had suggested physical therapy for strengthening at her last clinic visit on 10/8/2019.  Patient states she did not get a call to arrange therapy and has not begun.  She continues taking oxycodone at 1 5 mg tablet about every 6 hours.  She just got a recent refill.  She continues on Lyrica 25 mg 3 times daily, Tylenol and cyclobenzaprine.  Leg pain is about equal to back pain at 50/50    Oswestry (GABRIEL) Questionnaire    OSWESTRY DISABILITY INDEX 11/12/2019   Count 10   Sum 25   Oswestry Score (%) 50       PROMIS-10 Scores: 19     O>   Alert, oriented x 3, cooperative.  Not in CP distress.  There were no vitals taken for this visit.  Surgical incision well-healed, no sign of infection.  Ambulates independently.   Grossly neurologically intact.  Transitional moves are performed with strong accessory use of the arms.  ROM: She can forward flex to bring her hands to her knees.  She can extend her low back about 25 degrees.  She can lean 30 degrees and rotate 45 degrees bilaterally.  She does seem to move more easily than at her last office visit.  She declines to attempt a squat and describes utilizing her arms in the furniture heavily to arise from bending down any time at home.  Lower extremity strength is intact at 5/5 bilaterally throughout hamstrings quads, great toe plantar dorsiflexion, hip flexors.  Hip rotation shows mildly decreased internal rotation bilaterally with no pain at endpoints.  Nerve tension testing does elicit pain to bilateral posterior calves, patient has some difficulty delineating whether this is hamstring tightness versus radiculopathy.    DTR: Trace at right patellar tendon, 1+ at left.  Trace at bilateral Achilles    She is tender to palpation at her lumbar paraspinal musculature, particularly at the lumbosacral junction.  Minimally at proximal gluteals bilaterally right greater than left at sacroiliac joints and right greater trochanter.    Imaging:   Lateral lumbar  spine x-ray was obtained today.  This shows 5 lumbar type vertebrae in generally good alignment with anterior hardware at L4-5 and L5-S1 with posterior instrumented fusion L3 to the sacrum.  With TLIF implants at L3-4.  There is no evidence of hardware loosening or fracture.  Imaging is unchanged since prior on 10/8/2019  LL: 44  L4-S1: 33  PT: 24    A>  3 mos postoperative, with still ongoing postoperative right thigh pain/discomfort.    P>   We will obtain a CT myelogram to further investigate the cause of her right thigh pain.  If this should delineate nerve problems we would consider injection therapy.  We would base any consideration of future lumbar surgery on this imaging also.  She was given a note to start physical therapy locally in [place].  We would like her to follow-up in 3 months (6 mos postop) with repeat EOS lumbar AP/lateral x-ray studies    Rod Maedra PA-C    Attestation:  I (Dr. Jeremiah Carrion - Spine Surgeon) have personally evaluated patient with NASEEM Madera, and agree with findings and plan outlined in the note, which I also edited.  I discussed at length with the patient/family, explained the nature of spinal condition, and formulated workup and/or treatment plan together.  All questions were answered to the best of my ability and to patient's apparent satisfaction.      Jeremiah Carrion MD    Orthopaedic Spine Surgery  Dept Orthopaedic Surgery, [facility]  [phone] office, [phone] pager  [email or web address]

## 2019-11-12 NOTE — NURSING NOTE
Reason For Visit:   Chief Complaint   Patient presents with     Surgical Followup     surgeries 8-23-19 Revision & 7/29/19 Part 1 (supine): ALIF L4-5,L5-S1; use of Infuse BMP (Medium kit)      Primary MD: Mary Ellen Salgado     ?  No  Occupation N/A.  Currently working? No.  Work status?  On disability.  Date of surgery: 2011  Type of surgery: Decompression on lower back.  Smoker: No       Pain Assessment  Patient Currently in Pain: Yes  0-10 Pain Scale: 8  Primary Pain Location: Leg  Pain Descriptors: Discomfort        Oswestry (GABRIEL) Questionnaire    OSWESTRY DISABILITY INDEX 11/12/2019   Count 10   Sum 25   Oswestry Score (%) 50        Visual Analog Pain Scale  Back Pain Scale 0-10: 7  Right leg pain: 8  Left leg pain: 0    Promis 10 Assessment    PROMIS 10 11/12/2019   In general, would you say your health is: Good   In general, would you say your quality of life is: Good   In general, how would you rate your physical health? Fair   In general, how would you rate your mental health, including your mood and your ability to think? Fair   In general, how would you rate your satisfaction with your social activities and relationships? Fair   In general, please rate how well you carry out your usual social activities and roles Fair   To what extent are you able to carry out your everyday physical activities such as walking, climbing stairs, carrying groceries, or moving a chair? A little   How often have you been bothered by emotional problems such as feeling anxious, depressed or irritable? Often   How would you rate your fatigue on average? Mild   How would you rate your pain on average?   0 = No Pain  to  10 = Worst Imaginable Pain 7   Global Physical Health Score : Raw Score -   Global Mental Health Score : Raw Score -   Total (Physical + Mental Health Score) -   In general, would you say your health is: 3   In general, would you say your quality of life is: 3   In general, how would you rate your physical  health? 2   In general, how would you rate your mental health, including your mood and your ability to think? 2   In general, how would you rate your satisfaction with your social activities and relationships? 2   In general, please rate how well you carry out your usual social activities and roles. (This includes activities at home, at work and in your community, and responsibilities as a parent, child, spouse, employee, friend, etc.) 2   To what extent are you able to carry out your everyday physical activities such as walking, climbing stairs, carrying groceries, or moving a chair? 2   In the past 7 days, how often have you been bothered by emotional problems such as feeling anxious, depressed, or irritable? 4   In the past 7 days, how would you rate your fatigue on average? 2   In the past 7 days, how would you rate your pain on average, where 0 means no pain, and 10 means worst imaginable pain? 7   Global Mental Health Score 9   Global Physical Health Score 10   PROMIS TOTAL - SUBSCORES 19   Some recent data might be hidden                Nkechi Delgado LPN

## 2019-11-13 ENCOUNTER — MYC REFILL (OUTPATIENT)
Dept: ORTHOPEDICS | Facility: CLINIC | Age: 54
End: 2019-11-13

## 2019-11-13 DIAGNOSIS — Z98.890 S/P SPINAL SURGERY: ICD-10-CM

## 2019-11-13 RX ORDER — PREGABALIN 25 MG/1
25 CAPSULE ORAL 3 TIMES DAILY
Qty: 180 CAPSULE | Refills: 0 | Status: SHIPPED | OUTPATIENT
Start: 2019-11-13 | End: 2019-12-09

## 2019-11-15 ENCOUNTER — MYC MEDICAL ADVICE (OUTPATIENT)
Dept: ORTHOPEDICS | Facility: CLINIC | Age: 54
End: 2019-11-15

## 2019-11-19 ENCOUNTER — MYC REFILL (OUTPATIENT)
Dept: ORTHOPEDICS | Facility: CLINIC | Age: 54
End: 2019-11-19

## 2019-11-19 ENCOUNTER — DOCUMENTATION ONLY (OUTPATIENT)
Dept: RADIOLOGY | Facility: CLINIC | Age: 54
End: 2019-11-19

## 2019-11-19 DIAGNOSIS — M47.816 LUMBAR SPONDYLOSIS: ICD-10-CM

## 2019-11-19 DIAGNOSIS — M54.16 LUMBAR RADICULAR PAIN: ICD-10-CM

## 2019-11-19 RX ORDER — OXYCODONE HYDROCHLORIDE 5 MG/1
5 TABLET ORAL EVERY 6 HOURS PRN
Qty: 60 TABLET | Refills: 0 | Status: SHIPPED | OUTPATIENT
Start: 2019-11-19 | End: 2019-11-29

## 2019-11-19 NOTE — TELEPHONE ENCOUNTER
See dictation. Myelogram pending for continued Right thigh & leg pain.  I called pt. Back.  Myelogram at [facility] not scheduled until end of Dec. Pt states due to ride situation so I encouraged her to figure out a sooner ride & call [facility] to do it sooner & she agreed.  I told pt she can not take more Oxycodone than   what is ordered 1 Q6H prn & she understood & agreed.  Also taking Lyrica TID & Flexeril at HS & Tylenol.  Has not started PT that was ordered so advised her to call & schedule her PT & she agreed.     Refilled Oxycodone -see separate refill encounter.  Call back prn. Pt agreed.  S.O.W.O./Belinda Gary RN.

## 2019-11-19 NOTE — PROGRESS NOTES
Patient to be placed onNeuro Radiology schedule for a IR/XR Myelography Lumbar Spine due to right anterior thigh radiculopathy s/p surgery with sedation   Dx: Lumbar radicular pain  Discussed with  Dr. Yamileth Anderson IR RPA  [phone] Call pager  [phone] pager

## 2019-11-26 ENCOUNTER — TELEPHONE (OUTPATIENT)
Dept: INTERVENTIONAL RADIOLOGY/VASCULAR | Facility: CLINIC | Age: 54
End: 2019-11-26

## 2019-11-29 ENCOUNTER — MYC REFILL (OUTPATIENT)
Dept: ORTHOPEDICS | Facility: CLINIC | Age: 54
End: 2019-11-29

## 2019-11-29 DIAGNOSIS — M47.816 LUMBAR SPONDYLOSIS: ICD-10-CM

## 2019-11-29 DIAGNOSIS — M54.16 LUMBAR RADICULAR PAIN: ICD-10-CM

## 2019-11-29 RX ORDER — OXYCODONE HYDROCHLORIDE 5 MG/1
5 TABLET ORAL EVERY 6 HOURS PRN
Qty: 60 TABLET | Refills: 0 | Status: SHIPPED | OUTPATIENT
Start: 2019-11-29 | End: 2019-12-18

## 2019-12-02 ENCOUNTER — HOSPITAL ENCOUNTER (OUTPATIENT)
Dept: CT IMAGING | Facility: CLINIC | Age: 54
End: 2019-12-02
Attending: PHYSICIAN ASSISTANT | Admitting: ORTHOPAEDIC SURGERY
Payer: COMMERCIAL

## 2019-12-02 ENCOUNTER — APPOINTMENT (OUTPATIENT)
Dept: MEDSURG UNIT | Facility: CLINIC | Age: 54
End: 2019-12-02
Attending: ORTHOPAEDIC SURGERY
Payer: COMMERCIAL

## 2019-12-02 ENCOUNTER — HOSPITAL ENCOUNTER (OUTPATIENT)
Facility: CLINIC | Age: 54
Discharge: HOME | End: 2019-12-02
Attending: ORTHOPAEDIC SURGERY | Admitting: ORTHOPAEDIC SURGERY
Payer: COMMERCIAL

## 2019-12-02 ENCOUNTER — HOSPITAL ENCOUNTER (OUTPATIENT)
Dept: INTERVENTIONAL RADIOLOGY/VASCULAR | Facility: CLINIC | Age: 54
End: 2019-12-02
Attending: PHYSICIAN ASSISTANT | Admitting: ORTHOPAEDIC SURGERY
Payer: COMMERCIAL

## 2019-12-02 VITALS
RESPIRATION RATE: 18 BRPM | HEART RATE: 72 BPM | SYSTOLIC BLOOD PRESSURE: 132 MMHG | OXYGEN SATURATION: 98 % | TEMPERATURE: 98.2 F | DIASTOLIC BLOOD PRESSURE: 68 MMHG | WEIGHT: 185 LBS | BODY MASS INDEX: 28.13 KG/M2

## 2019-12-02 DIAGNOSIS — M54.16 LUMBAR RADICULAR PAIN: ICD-10-CM

## 2019-12-02 DIAGNOSIS — M54.40 ACUTE BILATERAL LOW BACK PAIN WITH SCIATICA, SCIATICA LATERALITY UNSPECIFIED: ICD-10-CM

## 2019-12-02 DIAGNOSIS — Z98.1 S/P SPINAL FUSION: ICD-10-CM

## 2019-12-02 LAB
APTT PPP: 31 SEC (ref 22–37)
B-HCG SERPL-ACNC: 5 IU/L (ref 0–5)
GLUCOSE BLDC GLUCOMTR-MCNC: 106 MG/DL (ref 70–99)
GLUCOSE BLDC GLUCOMTR-MCNC: 147 MG/DL (ref 70–99)
INR PPP: 1.08 (ref 0.86–1.14)
PLATELET # BLD AUTO: 370 10E9/L (ref 150–450)

## 2019-12-02 PROCEDURE — 99152 MOD SED SAME PHYS/QHP 5/>YRS: CPT

## 2019-12-02 PROCEDURE — 62284 INJECTION FOR MYELOGRAM: CPT

## 2019-12-02 PROCEDURE — 25000125 ZZHC RX 250: Performed by: RADIOLOGY

## 2019-12-02 PROCEDURE — 25800030 ZZH RX IP 258 OP 636: Performed by: RADIOLOGY

## 2019-12-02 PROCEDURE — 85049 AUTOMATED PLATELET COUNT: CPT | Performed by: RADIOLOGY

## 2019-12-02 PROCEDURE — 72240 MYELOGRAPHY NECK SPINE: CPT

## 2019-12-02 PROCEDURE — 25000128 H RX IP 250 OP 636: Performed by: RADIOLOGY

## 2019-12-02 PROCEDURE — 72132 CT LUMBAR SPINE W/DYE: CPT

## 2019-12-02 PROCEDURE — 62302 MYELOGRAPHY LUMBAR INJECTION: CPT

## 2019-12-02 PROCEDURE — 82962 GLUCOSE BLOOD TEST: CPT

## 2019-12-02 PROCEDURE — 25000132 ZZH RX MED GY IP 250 OP 250 PS 637: Performed by: RADIOLOGY

## 2019-12-02 PROCEDURE — 25500064 ZZH RX 255 OP 636: Performed by: RADIOLOGY

## 2019-12-02 PROCEDURE — 40000167 ZZH STATISTIC PP CARE STAGE 2

## 2019-12-02 PROCEDURE — 85610 PROTHROMBIN TIME: CPT | Performed by: RADIOLOGY

## 2019-12-02 PROCEDURE — 85730 THROMBOPLASTIN TIME PARTIAL: CPT | Performed by: RADIOLOGY

## 2019-12-02 PROCEDURE — 84702 CHORIONIC GONADOTROPIN TEST: CPT | Performed by: RADIOLOGY

## 2019-12-02 RX ORDER — NICOTINE POLACRILEX 4 MG
15-30 LOZENGE BUCCAL
Status: DISCONTINUED | OUTPATIENT
Start: 2019-12-02 | End: 2019-12-03 | Stop reason: HOSPADM

## 2019-12-02 RX ORDER — NICOTINE POLACRILEX 4 MG
15-30 LOZENGE BUCCAL
Status: CANCELLED | OUTPATIENT
Start: 2019-12-02

## 2019-12-02 RX ORDER — DEXTROSE MONOHYDRATE 25 G/50ML
25-50 INJECTION, SOLUTION INTRAVENOUS
Status: CANCELLED | OUTPATIENT
Start: 2019-12-02

## 2019-12-02 RX ORDER — SODIUM CHLORIDE 9 MG/ML
INJECTION, SOLUTION INTRAVENOUS CONTINUOUS
Status: DISCONTINUED | OUTPATIENT
Start: 2019-12-02 | End: 2019-12-03 | Stop reason: HOSPADM

## 2019-12-02 RX ORDER — DEXTROSE MONOHYDRATE 25 G/50ML
25-50 INJECTION, SOLUTION INTRAVENOUS
Status: DISCONTINUED | OUTPATIENT
Start: 2019-12-02 | End: 2019-12-03 | Stop reason: HOSPADM

## 2019-12-02 RX ORDER — ACETAMINOPHEN 325 MG/1
650 TABLET ORAL EVERY 4 HOURS PRN
Status: DISCONTINUED | OUTPATIENT
Start: 2019-12-02 | End: 2019-12-03 | Stop reason: HOSPADM

## 2019-12-02 RX ORDER — SODIUM CHLORIDE 9 MG/ML
INJECTION, SOLUTION INTRAVENOUS CONTINUOUS
Status: CANCELLED | OUTPATIENT
Start: 2019-12-02

## 2019-12-02 RX ORDER — FLUMAZENIL 0.1 MG/ML
0.2 INJECTION, SOLUTION INTRAVENOUS
Status: DISCONTINUED | OUTPATIENT
Start: 2019-12-02 | End: 2019-12-03 | Stop reason: HOSPADM

## 2019-12-02 RX ORDER — IOPAMIDOL 408 MG/ML
20 INJECTION, SOLUTION INTRATHECAL ONCE
Status: COMPLETED | OUTPATIENT
Start: 2019-12-02 | End: 2019-12-02

## 2019-12-02 RX ORDER — NALOXONE HYDROCHLORIDE 0.4 MG/ML
.1-.4 INJECTION, SOLUTION INTRAMUSCULAR; INTRAVENOUS; SUBCUTANEOUS
Status: DISCONTINUED | OUTPATIENT
Start: 2019-12-02 | End: 2019-12-03 | Stop reason: HOSPADM

## 2019-12-02 RX ORDER — FENTANYL CITRATE 50 UG/ML
25-50 INJECTION, SOLUTION INTRAMUSCULAR; INTRAVENOUS EVERY 5 MIN PRN
Status: DISCONTINUED | OUTPATIENT
Start: 2019-12-02 | End: 2019-12-03 | Stop reason: HOSPADM

## 2019-12-02 RX ADMIN — MIDAZOLAM HYDROCHLORIDE 1 MG: 2 INJECTION, SOLUTION INTRAMUSCULAR; INTRAVENOUS at 10:40

## 2019-12-02 RX ADMIN — LIDOCAINE HYDROCHLORIDE 6 ML: 10 INJECTION, SOLUTION EPIDURAL; INFILTRATION; INTRACAUDAL; PERINEURAL at 10:35

## 2019-12-02 RX ADMIN — IOPAMIDOL 17 ML: 408 INJECTION, SOLUTION INTRATHECAL at 11:00

## 2019-12-02 RX ADMIN — FENTANYL CITRATE 50 MCG: 50 INJECTION, SOLUTION INTRAMUSCULAR; INTRAVENOUS at 10:30

## 2019-12-02 RX ADMIN — MIDAZOLAM HYDROCHLORIDE 1 MG: 2 INJECTION, SOLUTION INTRAMUSCULAR; INTRAVENOUS at 10:45

## 2019-12-02 RX ADMIN — ACETAMINOPHEN 650 MG: 325 TABLET, FILM COATED ORAL at 11:38

## 2019-12-02 RX ADMIN — FENTANYL CITRATE 50 MCG: 50 INJECTION, SOLUTION INTRAMUSCULAR; INTRAVENOUS at 10:46

## 2019-12-02 RX ADMIN — SODIUM CHLORIDE: 9 INJECTION, SOLUTION INTRAVENOUS at 09:18

## 2019-12-02 RX ADMIN — MIDAZOLAM HYDROCHLORIDE 1 MG: 2 INJECTION, SOLUTION INTRAMUSCULAR; INTRAVENOUS at 10:30

## 2019-12-02 RX ADMIN — FENTANYL CITRATE 50 MCG: 50 INJECTION, SOLUTION INTRAMUSCULAR; INTRAVENOUS at 10:40

## 2019-12-02 ASSESSMENT — PAIN DESCRIPTION - DESCRIPTORS
DESCRIPTORS: DISCOMFORT;DULL
DESCRIPTORS: DISCOMFORT

## 2019-12-02 NOTE — PRE-PROCEDURE
GENERAL PRE-PROCEDURE:   Procedure:  CT Myelogram  Date/Time:  12/2/2019 9:45 AM    Written consent obtained?: Yes    Risks and benefits: Risks, benefits and alternatives were discussed    Consent given by:  Patient  Patient states understanding of procedure being performed: Yes    Patient's understanding of procedure matches consent: Yes    Procedure consent matches procedure scheduled: Yes    Expected level of sedation:  Moderate  Appropriately NPO:  Yes  ASA Class:  Class 1- healthy patient  Mallampati  :  Grade 1- soft palate, uvula, tonsillar pillars, and posterior pharyngeal wall visible  Lungs:  Lungs clear with good breath sounds bilaterally  Heart:  Normal heart sounds and rate  History & Physical reviewed:  History and physical reviewed and no updates needed  Statement of review:  I have reviewed the lab findings, diagnostic data, medications, and the plan for sedation

## 2019-12-02 NOTE — PROGRESS NOTES
Pt here in 2a for lumbar spine myelogram. Friend at bedside. Alert. Ambulatory. Labs sent and results reviewed. Pt will discuss plan for pain/sedation with provider. Orders in for sedation. FriendRanda, plans to drive pt home after procedure.

## 2019-12-02 NOTE — PROGRESS NOTES
Pt arrived to floor with RN from IR s/p Myelogram with sedation. VSS. Lower lumbar dressing CDI, no hematoma, minimal blood noted. Pt c/o mild chronic back pain, Tylenol 650 mg po per MD orders with good relief. Tolerating regular diet. Plan discharge at 1315 per MD orders. Friend Randa at bedside will be transporting pt home.

## 2019-12-02 NOTE — PROGRESS NOTES
Pt ambulated to bathroom without assist. PIV removed. Discharge instructions reviewed. Pt discharge to home via wheelchair. Friend, Randa, driving pt home and will stay with pt.

## 2019-12-02 NOTE — IP AVS SNAPSHOT
[facility], [place], Interventional Radiology  [address] [address]  Phone:  [phone]                                    After Visit Summary   12/2/2019    Yun Sagastume    MRN: [id number]           After Visit Summary Signature Page    I have received my discharge instructions, and my questions have been answered. I have discussed any challenges I see with this plan with the nurse or doctor.    ..........................................................................................................................................  Patient/Patient Representative Signature      ..........................................................................................................................................  Patient Representative Print Name and Relationship to Patient    ..................................................               ................................................  Date                                   Time    ..........................................................................................................................................  Reviewed by Signature/Title    ...................................................              ..............................................  Date                                               Time          22EPIC Rev 08/18

## 2019-12-02 NOTE — PROGRESS NOTES
Patient Name: Yun Sagastume  Medical Record Number: [id number]  Today's Date: 12/2/2019    Procedure: Image Guided Myelogram  Proceduralist: Dr. Bora Carson    Sedation start time: 1035  Sedation end time: 1055  Sedation medications administered: Fentanyl 150 mcg, Versed 3 mg   Total sedation time: 20 minutes    Procedure start time: 1035  Puncture time: 1040  Procedure end time: 1055    Report given to: HIEU Asencio 2A  : n/a    Other Notes: Pt arrived to IR room #5 from Unit 2A. Consent reviewed. Pt denies any questions or concerns regarding procedure. Pt positioned prone and monitored per protocol. Myelogram done by Dr. Carson. Pt tolerated procedure without any noted complications. Pt transferred back to Unit 2A.

## 2019-12-02 NOTE — PROCEDURES
Interventional Radiology Brief Post Procedure Note    Procedure: Lumbar Puncture and CT myelogram    Proceduralist: Dr. Carson    Assistant: Dr. Trejo    Time Out: Prior to the start of the procedure and with procedural staff participation, I verbally confirmed the patient s identity using two indicators, relevant allergies, that the procedure was appropriate and matched the consent or emergent situation, and that the correct equipment/implants were available. Immediately prior to starting the procedure I conducted the Time Out with the procedural staff and re-confirmed the patient s name, procedure, and site/side. (The Joint Commission universal protocol was followed.)  Yes        Sedation: IR Nurse Monitored Care   Post Procedure Summary:  Prior to the start of the procedure and with procedural staff participation, I verbally confirmed the patient s identity using two indicators, relevant allergies, that the procedure was appropriate and matched the consent or emergent situation, and that the correct equipment/implants were available. Immediately prior to starting the procedure I conducted the Time Out with the procedural staff and re-confirmed the patient s name, procedure, and site/side. (The Joint Commission universal protocol was followed.)  Yes       Sedatives: Fentanyl and Midazolam (Versed)    Vital signs, airway and pulse oximetry were monitored and remained stable throughout the procedure and sedation was maintained until the procedure was complete.  The patient was monitored by staff until sedation discharge criteria were met.    Patient tolerance: Patient tolerated the procedure well with no immediate complications.    Time of sedation in minutes: 15 Minutes minutes from beginning to end of physician one to one monitoring.      Findings: L2-3 Lumbar Puncture.    Estimated Blood Loss: None    SPECIMENS: None    Complications: 1. None     Condition: Stable    Plan: Bedrest for 1 hour    Comments: See  dictated procedure note for full details.    Rocio Trejo MD

## 2019-12-02 NOTE — IP AVS SNAPSHOT
MRN:[id number]                      After Visit Summary   12/2/2019    Yun Sagastume    MRN: [id number]           Visit Information        Provider Department      12/2/2019  9:30 AM [facility] NEURO RAD; [facility], [place], Interventional Radiology           Review of your medicines      Notice    This visit is during an admission. Changes to the med list made in this visit will be reflected in the After Visit Summary of the admission.           Protect others around you: Learn how to safely use, store and throw away your medicines at www.disposemymeds.org.       Follow-ups after your visit       Your next 10 appointments already scheduled    Feb 04, 2020  9:30 AM CST  (Arrive by 9:00 AM)  RETURN SPINE with Jeremiah Carrion MD  [facility] ([facility]) [address] [place] [id number] [phone]         Care Instructions       After Care Instructions     Activity: Bedrest OUTPATIENT      Bedrest with head of bed flat for 1 hour then discharge. Patient is allowed to have head up for meals and for bathroom privileges only.           Further instructions from your care team                                                [facility]                                      Radiology Discharge instructions Post Myelogram       AFTER YOU GO HOME      Relax and take it easy for 24 hours    Do Not lay flat on your back for the first 8 hours. Keep head of bed elevated at least 30 degrees.    You may resume normal activity tomorrow    You may remove the bandage on your back in the evening or next morning    You may resume bathing the next day    Drink at least 4 glasses of extra fluid today if not on a fluid restriction    DO NOT drive or operate machinery at home or at work for at least 24 hours                   CALL YOU PRIMARY PHYSICIAN IF:    If you start to leak a large amount of fluid from the puncture site, lie  down flat on your back. Call your primary physician, they will tell you if you need or return to the hospital    You develop a severe headache    You develop nausea or vomiting     You develop a temperature of 101 degrees or greater                 ADDITIONAL INSTRUCTIONS:         [facility] RADIOLOGY DEPARTMENT             Procedure Physician:                                     Date of Procedure: December 2, 2019               [facility]...........[phone].......Ask for the Radiologist on call. Someone is on call 24 hour/day               [facility] Toll Free Number.........[phone].....Monday-Friday 8:00 am to 4:30 pm                     Additional Information About Your Visit       [employer] Information    [employer] gives you secure access to your electronic health record. If you see a primary care provider, you can also send messages to your care team and make appointments. If you have questions, please call your primary care clinic.  If you do not have a primary care provider, please call [phone] and they will assist you.       Care EveryWhere ID    This is your Care EveryWhere ID. This could be used by other organizations to access your [place] medical records  [id number]       Your Vitals Were  Most recent update: 12/2/2019 11:39 AM    Blood Pressure   130/81          Pulse   69          Temperature   98.2  F (36.8  C) (Oral)          Respirations   16          Weight   83.9 kg (185 lb)             Pulse Oximetry   98%    BMI (Body Mass Index)   28.13 kg/m           Primary Care Provider Office Phone # Fax #    Mary Ellen Salgado [phone]      Equal Access to Services    [facility] AH: Hadii aad ku hadasho Soomaali, waaxda luqadaha, qaybta kaalmada adeegyada, waxay lawrence geller . So [facility] [phone].    ATENCIÓN: Si habla español, tiene a grove disposición servicios gratuitos de asistencia lingüística. Llame al [phone].    We comply with applicable federal and state  civil rights laws, including the [place] Human Rights Act. We do not discriminate on the basis of race, color, creed, Roman Catholic, national origin, marital status, age, disability, sex, sexual orientation, or gender identity.       Thank you!    Thank you for choosing [facility] for your care. Our goal is always to provide you with excellent care. Hearing back from our patients is one way we can continue to improve our services. Please take a few minutes to complete the written survey that you may receive in the mail after you visit with us. Thank you!         Medication List     Notice    This visit is during an admission. Changes to the med list made in this visit will be reflected in the After Visit Summary of the admission.

## 2019-12-02 NOTE — DISCHARGE INSTRUCTIONS
[facility]                                      Radiology Discharge instructions Post Myelogram       AFTER YOU GO HOME      Relax and take it easy for 24 hours    Do Not lay flat on your back for the first 8 hours. Keep head of bed elevated at least 30 degrees.    You may resume normal activity tomorrow    You may remove the bandage on your back in the evening or next morning    You may resume bathing the next day    Drink at least 4 glasses of extra fluid today if not on a fluid restriction    DO NOT drive or operate machinery at home or at work for at least 24 hours                   CALL YOU PRIMARY PHYSICIAN IF:    If you start to leak a large amount of fluid from the puncture site, lie down flat on your back. Call your primary physician, they will tell you if you need or return to the hospital    You develop a severe headache    You develop nausea or vomiting     You develop a temperature of 101 degrees or greater                 ADDITIONAL INSTRUCTIONS:         [facility] RADIOLOGY DEPARTMENT             Procedure Physician:                                     Date of Procedure: December 2, 2019               [facility]...........[phone].......Ask for the Radiologist on call. Someone is on call 24 hour/day               [facility] Toll Free Number.........[phone].....Monday-Friday 8:00 am to 4:30 pm

## 2019-12-02 NOTE — PROCEDURES
Procedures  [facility] Procedure Note          Lumbar Puncture and CT myelogram:      Time: 11:04 AM  Performed by: Rocio Trejo MD  Authorized by: Raghu Carson MD    Indications: CT Myelogram    Consent given by: Patient who states understanding of the procedure being performed after discussing the risks, benefits and alternatives.    Prior to the start of the procedure and with procedural staff participation, I verbally confirmed the patient s identity using two indicators, relevant allergies, that the procedure was appropriate and matched the consent or emergent situation, and that the correct equipment/implants were available. Immediately prior to starting the procedure I conducted the Time Out with the procedural staff and re-confirmed the patient s name, procedure, and site/side. (The Joint Commission universal protocol was followed.) Yes    Under sterile conditions the patient was positioned prone. Betadine solution and sterile drapes were utilized.  Local anesthetic at the site: 2 ml of lidocaine 1% without epinephrine from the LP tray  A 22 G spinal needle was inserted at the L2-3 interspace.  17mL contrast is injected and checked with fluoroscopy.  After the needle was removed, a bandaid and pressure were applied and the patient was instructed to stay horizontal until the results were back.    Complications:  None    Patient tolerance: Patient tolerated the procedure well with no immediate complications.

## 2019-12-06 ENCOUNTER — MYC MEDICAL ADVICE (OUTPATIENT)
Dept: ORTHOPEDICS | Facility: CLINIC | Age: 54
End: 2019-12-06

## 2019-12-09 ENCOUNTER — MYC REFILL (OUTPATIENT)
Dept: ORTHOPEDICS | Facility: CLINIC | Age: 54
End: 2019-12-09

## 2019-12-09 DIAGNOSIS — Z98.890 S/P SPINAL SURGERY: ICD-10-CM

## 2019-12-09 RX ORDER — PREGABALIN 25 MG/1
25 CAPSULE ORAL 3 TIMES DAILY
Qty: 180 CAPSULE | Refills: 0 | Status: SHIPPED | OUTPATIENT
Start: 2019-12-09 | End: 2020-01-06

## 2019-12-10 ENCOUNTER — MYC MEDICAL ADVICE (OUTPATIENT)
Dept: ORTHOPEDICS | Facility: CLINIC | Age: 54
End: 2019-12-10

## 2019-12-10 NOTE — TELEPHONE ENCOUNTER
See phone messages.   reviewed Myelogram & added addendum to his last dictation.  I called pt & relayed info. In dictation.   Pt. Given ph# for rad to schedule injection but pt. States does not want anymore injections & scared of needles in her back & wants to see  first before she decides.  She wants to move feb appt  Up sooner.    Call back prn.  Pt. Agreed. W.O./Belinda Gary RN.

## 2019-12-18 ENCOUNTER — MYC REFILL (OUTPATIENT)
Dept: ORTHOPEDICS | Facility: CLINIC | Age: 54
End: 2019-12-18

## 2019-12-18 DIAGNOSIS — M47.816 LUMBAR SPONDYLOSIS: ICD-10-CM

## 2019-12-18 DIAGNOSIS — M54.16 LUMBAR RADICULAR PAIN: ICD-10-CM

## 2019-12-18 RX ORDER — OXYCODONE HYDROCHLORIDE 5 MG/1
5 TABLET ORAL EVERY 6 HOURS PRN
Qty: 60 TABLET | Refills: 0 | Status: SHIPPED | OUTPATIENT
Start: 2019-12-18 | End: 2019-12-30

## 2019-12-23 DIAGNOSIS — Z98.1 S/P SPINAL FUSION: Primary | ICD-10-CM

## 2019-12-30 ENCOUNTER — MYC REFILL (OUTPATIENT)
Dept: ORTHOPEDICS | Facility: CLINIC | Age: 54
End: 2019-12-30

## 2019-12-30 DIAGNOSIS — M54.16 LUMBAR RADICULAR PAIN: ICD-10-CM

## 2019-12-30 DIAGNOSIS — M47.816 LUMBAR SPONDYLOSIS: ICD-10-CM

## 2019-12-30 RX ORDER — OXYCODONE HYDROCHLORIDE 5 MG/1
5 TABLET ORAL EVERY 6 HOURS PRN
Qty: 60 TABLET | Refills: 0 | Status: SHIPPED | OUTPATIENT
Start: 2019-12-30 | End: 2020-01-09

## 2020-01-01 NOTE — PROGRESS NOTES
"Spine Surgical Hx:  07/29/19 - Lateral ALIF-SPO L4-5 and L5-S1; TLIF (R facetectomy) L3-4; PISF L3-pelvis; use of Infuse BMP medium kit, Magnifuse allograft, crushed allograft (Sembrano/Rudy) for advanced spondylosis L3-S1 with sagittal malalignment (lower lumbar hypolordosis).  [Implants:  NuVasive Open Reline screw system, 5.5 mm CoCr rods x 2; Base titanium cages; CoRoent large oblique PEEK TLIF cage].  08/23/2019 - Revision Lateral ALIF L4-5 (Sembrano/Rudy) for cage migration.  [Implants:  NuVasive Base titanium cage].      Reason for Visit:  Chief Complaint   Patient presents with     RECHECK     F/U Lumbar Myelogram at [facility]. Surgeries 8-23-19 Revision & 7/29/19 Part 1 (supine): ALIF L4-5,L5-S1; use of Infuse BMP (Medium kit) Part 2 . Part 2 (prone): PISF L3-pelvis; TLIF (R facetectomy), possible SPO L3-4; SPO L4-5,L5-S1; cement augmentation of screws, Appt per Belinda FINNEY.       S>  54/f, 5 mos postop; last seen at 3.5 mos (11/12/19).  Still with continuing numbness over R anterolateral thigh (since after index surgery).  Was still taking oxycodone 5mg every 6 hours (~ 20 mg/day).  Ordered lumbar CT myelogram; start PT.    R thigh sxs still there; now is more of pain; used to be more of numbness.  So getting even more bothersome.    Still taking oxycodone, 5mg tabs, ~ 4 tabs/day (20 mg/day).  Had lumbar Ct myelogram.  Has not started PT, because she does not think she could do PT while her R leg/thigh is still not better.    7/30/17 - had LE EMG done c/o Dr. Raúl Yin (Neurology).  Impression:  \"This is an abnormal study. 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 There is no evidence of a lumbosacral radiculopathy affecting the lower limbs on the basis of this study.\"    Oswestry (GABRIEL) Questionnaire    OSWESTRY DISABILITY INDEX 1/2/2020   Count 10   Sum 27   Oswestry Score (%) 54      Preop GABRIEL 58%  2 mos  60%  3.5 mos 50%  5 mos  54%        O> "   Alert, oriented x 3, cooperative.  Not in CP distress.  There were no vitals taken for this visit.  Surgical incision well-healed, no sign of infection.  Ambulates independently.   Grossly neurologically intact.    Imaging:   EOS full spine AP lateral standing x-rays show stable interbody fusion and posterior fixation L3 to pelvis.  No sign of fixation loosening or failure.  No sign of proximal junctional failure.    Lumbar CT myelogram from 12/2/2019 reviewed previously.  Please refer to addendum to previous clinic note.  Essentially there was no evidence of screw malposition.  Per radiologist report, there was bilateral foraminal stenosis L2-3, although it does not seem that severe to me to be causing patient's current symptoms.  However I could not see any other finding that would more likely explain his current right thigh pain.    A>   5 months postoperative, with persistent postoperative right anterior thigh pain, etiology uncertain.    P>    I had a good long discussion with patient regarding her persistent right thigh symptoms.  I do not have a clear idea of the etiology of this pain.  It manifested itself only after the first surgery in August.  Noted almost immediately afterwards.  The second surgery did not improve this pain at all.  She did not have this preoperatively.  However, I do not see anything on the CT myelogram that would clearly be the explanation for this pain.  Given that her symptoms are not improving, and in fact seem to be getting worse, we would need further work-up to determine the etiology of this pain in an attempt to help provide relief.    - Bilateral LE EMG (compared against July 2017 study).  - Lumbar MRI  - Right L2-3 TFESI.  Advised to call 1-2 days later to report pain response.    RTC after above to review and discuss.  TT > 15 mins, > 50% CC.        Jeremiah Carrion MD    Orthopaedic Spine Surgery  Dept Orthopaedic Surgery, [facility]  Physicians  [phone] office, [phone] pager  [email or web address]

## 2020-01-02 ENCOUNTER — ANCILLARY PROCEDURE (OUTPATIENT)
Dept: GENERAL RADIOLOGY | Facility: CLINIC | Age: 55
End: 2020-01-02
Attending: ORTHOPAEDIC SURGERY
Payer: COMMERCIAL

## 2020-01-02 ENCOUNTER — OFFICE VISIT (OUTPATIENT)
Dept: ORTHOPEDICS | Facility: CLINIC | Age: 55
End: 2020-01-02
Payer: COMMERCIAL

## 2020-01-02 DIAGNOSIS — Z98.1 S/P SPINAL FUSION: ICD-10-CM

## 2020-01-02 DIAGNOSIS — M54.16 LUMBAR RADICULAR PAIN: Primary | ICD-10-CM

## 2020-01-02 ASSESSMENT — ENCOUNTER SYMPTOMS
HALLUCINATIONS: 0
LOSS OF CONSCIOUSNESS: 0
SEIZURES: 0
TINGLING: 1
NUMBNESS: 1
NERVOUS/ANXIOUS: 1
POLYDIPSIA: 0
DIZZINESS: 1
WEIGHT GAIN: 1
FATIGUE: 1
EXERCISE INTOLERANCE: 0
TREMORS: 0
PARALYSIS: 0
SPEECH CHANGE: 0
DISTURBANCES IN COORDINATION: 0
STIFFNESS: 0
HYPERTENSION: 1
BACK PAIN: 1
DECREASED APPETITE: 0
FEVER: 0
WEIGHT LOSS: 0
WEAKNESS: 1
HEADACHES: 0
HYPOTENSION: 0
SLEEP DISTURBANCES DUE TO BREATHING: 0
DEPRESSION: 1
SYNCOPE: 0
LIGHT-HEADEDNESS: 0
ALTERED TEMPERATURE REGULATION: 0
LEG PAIN: 1
MUSCLE CRAMPS: 1
MYALGIAS: 1
PALPITATIONS: 0
PANIC: 1
DECREASED CONCENTRATION: 0
MUSCLE WEAKNESS: 0
POLYPHAGIA: 0
NECK PAIN: 0
CHILLS: 0
ORTHOPNEA: 0
JOINT SWELLING: 0
NIGHT SWEATS: 1
MEMORY LOSS: 0
INSOMNIA: 1
ARTHRALGIAS: 1
INCREASED ENERGY: 1

## 2020-01-02 NOTE — LETTER
"1/2/2020       RE: Yun Sagastume  [address] [place] [address]     Dear Colleague,    Thank you for referring your patient, Yun Sagastume, to the [facility] at [facility]. Please see a copy of my visit note below.    Spine Surgical Hx:  07/29/19 - Lateral ALIF-SPO L4-5 and L5-S1; TLIF (R facetectomy) L3-4; PISF L3-pelvis; use of Infuse BMP medium kit, Magnifuse allograft, crushed allograft (Sembrano/Rudy) for advanced spondylosis L3-S1 with sagittal malalignment (lower lumbar hypolordosis).  [Implants:  NuVasive Open Reline screw system, 5.5 mm CoCr rods x 2; Base titanium cages; CoRoent large oblique PEEK TLIF cage].  08/23/2019 - Revision Lateral ALIF L4-5 (Sembrano/Rudy) for cage migration.  [Implants:  NuVasive Base titanium cage].      Reason for Visit:  Chief Complaint   Patient presents with     RECHECK     F/U Lumbar Myelogram at [facility]. Surgeries 8-23-19 Revision & 7/29/19 Part 1 (supine): ALIF L4-5,L5-S1; use of Infuse BMP (Medium kit) Part 2 . Part 2 (prone): PISF L3-pelvis; TLIF (R facetectomy), possible SPO L3-4; SPO L4-5,L5-S1; cement augmentation of screws, Appt per Belinda KELLY       S>  54/f, 5 mos postop; last seen at 3.5 mos (11/12/19).  Still with continuing numbness over R anterolateral thigh (since after index surgery).  Was still taking oxycodone 5mg every 6 hours (~ 20 mg/day).  Ordered lumbar CT myelogram; start PT.    R thigh sxs still there; now is more of pain; used to be more of numbness.  So getting even more bothersome.    Still taking oxycodone, 5mg tabs, ~ 4 tabs/day (20 mg/day).  Had lumbar Ct myelogram.  Has not started PT, because she does not think she could do PT while her R leg/thigh is still not better.    7/30/17 - had LE EMG done c/o Dr. Raúl Yin (Neurology).  Impression:  \"This is an abnormal study. 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 " "There is no evidence of a lumbosacral radiculopathy affecting the lower limbs on the basis of this study.\"    Oswestry (GABRIEL) Questionnaire    OSWESTRY DISABILITY INDEX 1/2/2020   Count 10   Sum 27   Oswestry Score (%) 54      Preop GABRIEL 58%  2 mos  60%  3.5 mos 50%  5 mos  54%        O>   Alert, oriented x 3, cooperative.  Not in CP distress.  There were no vitals taken for this visit.  Surgical incision well-healed, no sign of infection.  Ambulates independently.   Grossly neurologically intact.    Imaging:   EOS full spine AP lateral standing x-rays show stable interbody fusion and posterior fixation L3 to pelvis.  No sign of fixation loosening or failure.  No sign of proximal junctional failure.    Lumbar CT myelogram from 12/2/2019 reviewed previously.  Please refer to addendum to previous clinic note.  Essentially there was no evidence of screw malposition.  Per radiologist report, there was bilateral foraminal stenosis L2-3, although it does not seem that severe to me to be causing patient's current symptoms.  However I could not see any other finding that would more likely explain his current right thigh pain.    A>   5 months postoperative, with persistent postoperative right anterior thigh pain, etiology uncertain.    P>    I had a good long discussion with patient regarding her persistent right thigh symptoms.  I do not have a clear idea of the etiology of this pain.  It manifested itself only after the first surgery in August.  Noted almost immediately afterwards.  The second surgery did not improve this pain at all.  She did not have this preoperatively.  However, I do not see anything on the CT myelogram that would clearly be the explanation for this pain.  Given that her symptoms are not improving, and in fact seem to be getting worse, we would need further work-up to determine the etiology of this pain in an attempt to help provide relief.    - Bilateral LE EMG (compared against July 2017 study).  - " Lumbar MRI  - Right L2-3 TFESI.  Advised to call 1-2 days later to report pain response.    RTC after above to review and discuss.  TT > 15 mins, > 50% CC.        Jeremiah Carrion MD    Orthopaedic Spine Surgery  Dept Orthopaedic Surgery, [facility] Physicians  [phone] office, [phone] pager  [email or web address]

## 2020-01-02 NOTE — NURSING NOTE
Reason For Visit:   Chief Complaint   Patient presents with     RECHECK     F/U Lumbar Myelogram at [facility]. Surgeries 8-23-19 Revision & 7/29/19 Part 1 (supine): ALIF L4-5,L5-S1; use of Infuse BMP (Medium kit) Part 2 . Part 2 (prone): PISF L3-pelvis; TLIF (R facetectomy), possible SPO L3-4; SPO L4-5,L5-S1; cement augmentation of screws, Appt per Belinda Davies MD: Mary Ellen Salgado    ?  No  Occupation N/A.  Currently working? No.  Work status?  On disability.  Date of surgery: 2011  Type of surgery: Decompression on lower back.  Smoker: No    There were no vitals taken for this visit.    Pain Assessment  Patient Currently in Pain: Yes  0-10 Pain Scale: 6  Primary Pain Location: Back    Oswestry (GABRIEL) Questionnaire    OSWESTRY DISABILITY INDEX 1/2/2020   Count 10   Sum 27   Oswestry Score (%) 54      Neck Disability Index (NDI) Questionnaire    No flowsheet data found.     Visual Analog Pain Scale  Back Pain Scale 0-10: 6  Right leg pain: 8(thigh )  Left leg pain: 0    Promis 10 Assessment    PROMIS 10 1/2/2020   In general, would you say your health is: Good   In general, would you say your quality of life is: Good   In general, how would you rate your physical health? Fair   In general, how would you rate your mental health, including your mood and your ability to think? Fair   In general, how would you rate your satisfaction with your social activities and relationships? Good   In general, please rate how well you carry out your usual social activities and roles Fair   To what extent are you able to carry out your everyday physical activities such as walking, climbing stairs, carrying groceries, or moving a chair? A little   How often have you been bothered by emotional problems such as feeling anxious, depressed or irritable? Often   How would you rate your fatigue on average? Mild   How would you rate your pain on average?   0 = No Pain  to  10 = Worst Imaginable Pain 7   Global Physical Health Score  : Raw Score -   Global Mental Health Score : Raw Score -   Total (Physical + Mental Health Score) -   In general, would you say your health is: 3   In general, would you say your quality of life is: 3   In general, how would you rate your physical health? 2   In general, how would you rate your mental health, including your mood and your ability to think? 2   In general, how would you rate your satisfaction with your social activities and relationships? 3   In general, please rate how well you carry out your usual social activities and roles. (This includes activities at home, at work and in your community, and responsibilities as a parent, child, spouse, employee, friend, etc.) 2   To what extent are you able to carry out your everyday physical activities such as walking, climbing stairs, carrying groceries, or moving a chair? 2   In the past 7 days, how often have you been bothered by emotional problems such as feeling anxious, depressed, or irritable? 4   In the past 7 days, how would you rate your fatigue on average? 2   In the past 7 days, how would you rate your pain on average, where 0 means no pain, and 10 means worst imaginable pain? 7   Global Mental Health Score 10   Global Physical Health Score 10   PROMIS TOTAL - SUBSCORES 20   Some recent data might be hidden      Veronica Dominguez ATC

## 2020-01-06 ENCOUNTER — MYC REFILL (OUTPATIENT)
Dept: ORTHOPEDICS | Facility: CLINIC | Age: 55
End: 2020-01-06

## 2020-01-06 DIAGNOSIS — M62.830 MUSCLE SPASM OF BACK: ICD-10-CM

## 2020-01-06 DIAGNOSIS — Z98.890 S/P SPINAL SURGERY: ICD-10-CM

## 2020-01-06 RX ORDER — PREGABALIN 25 MG/1
25 CAPSULE ORAL 3 TIMES DAILY
Qty: 180 CAPSULE | Refills: 0 | Status: SHIPPED | OUTPATIENT
Start: 2020-01-06 | End: 2020-02-06

## 2020-01-06 RX ORDER — CYCLOBENZAPRINE HCL 10 MG
10 TABLET ORAL 2 TIMES DAILY PRN
Qty: 60 TABLET | Refills: 2 | Status: SHIPPED | OUTPATIENT
Start: 2020-01-06 | End: 2020-02-06

## 2020-01-09 ENCOUNTER — MYC REFILL (OUTPATIENT)
Dept: ORTHOPEDICS | Facility: CLINIC | Age: 55
End: 2020-01-09

## 2020-01-09 DIAGNOSIS — M54.16 LUMBAR RADICULAR PAIN: ICD-10-CM

## 2020-01-09 DIAGNOSIS — M47.816 LUMBAR SPONDYLOSIS: ICD-10-CM

## 2020-01-09 RX ORDER — OXYCODONE HYDROCHLORIDE 5 MG/1
5 TABLET ORAL EVERY 6 HOURS PRN
Qty: 60 TABLET | Refills: 0 | Status: SHIPPED | OUTPATIENT
Start: 2020-01-09 | End: 2020-01-20

## 2020-01-20 ENCOUNTER — MYC REFILL (OUTPATIENT)
Dept: ORTHOPEDICS | Facility: CLINIC | Age: 55
End: 2020-01-20

## 2020-01-20 DIAGNOSIS — M47.816 LUMBAR SPONDYLOSIS: ICD-10-CM

## 2020-01-20 DIAGNOSIS — M54.16 LUMBAR RADICULAR PAIN: ICD-10-CM

## 2020-01-20 RX ORDER — OXYCODONE HYDROCHLORIDE 5 MG/1
5 TABLET ORAL EVERY 6 HOURS PRN
Qty: 60 TABLET | Refills: 0 | Status: SHIPPED | OUTPATIENT
Start: 2020-01-20 | End: 2020-01-30

## 2020-01-30 ENCOUNTER — MYC REFILL (OUTPATIENT)
Dept: ORTHOPEDICS | Facility: CLINIC | Age: 55
End: 2020-01-30

## 2020-01-30 DIAGNOSIS — M47.816 LUMBAR SPONDYLOSIS: ICD-10-CM

## 2020-01-30 DIAGNOSIS — M54.16 LUMBAR RADICULAR PAIN: ICD-10-CM

## 2020-01-30 RX ORDER — OXYCODONE HYDROCHLORIDE 5 MG/1
5 TABLET ORAL EVERY 6 HOURS PRN
Qty: 60 TABLET | Refills: 0 | Status: SHIPPED | OUTPATIENT
Start: 2020-01-30 | End: 2020-02-13

## 2020-02-06 ENCOUNTER — MYC REFILL (OUTPATIENT)
Dept: ORTHOPEDICS | Facility: CLINIC | Age: 55
End: 2020-02-06

## 2020-02-06 DIAGNOSIS — M62.830 MUSCLE SPASM OF BACK: ICD-10-CM

## 2020-02-06 DIAGNOSIS — Z98.890 S/P SPINAL SURGERY: ICD-10-CM

## 2020-02-06 RX ORDER — PREGABALIN 25 MG/1
25 CAPSULE ORAL 3 TIMES DAILY
Qty: 180 CAPSULE | Refills: 0 | Status: SHIPPED | OUTPATIENT
Start: 2020-02-06 | End: 2020-03-31

## 2020-02-06 RX ORDER — CYCLOBENZAPRINE HCL 10 MG
10 TABLET ORAL 2 TIMES DAILY PRN
Qty: 60 TABLET | Refills: 2 | Status: SHIPPED | OUTPATIENT
Start: 2020-02-06 | End: 2020-05-04

## 2020-02-12 ENCOUNTER — MYC MEDICAL ADVICE (OUTPATIENT)
Dept: ORTHOPEDICS | Facility: CLINIC | Age: 55
End: 2020-02-12

## 2020-02-12 ENCOUNTER — DOCUMENTATION ONLY (OUTPATIENT)
Dept: CARE COORDINATION | Facility: CLINIC | Age: 55
End: 2020-02-12

## 2020-02-12 DIAGNOSIS — M47.816 LUMBAR SPONDYLOSIS: ICD-10-CM

## 2020-02-12 DIAGNOSIS — M54.16 LUMBAR RADICULAR PAIN: ICD-10-CM

## 2020-02-13 RX ORDER — OXYCODONE HYDROCHLORIDE 5 MG/1
5 TABLET ORAL EVERY 6 HOURS PRN
Qty: 60 TABLET | Refills: 0 | Status: SHIPPED | OUTPATIENT
Start: 2020-02-13 | End: 2020-02-26

## 2020-02-13 NOTE — TELEPHONE ENCOUNTER
"See My Chart messages.  See last dictation Jan 2.  RTC appt. 3-24-20 after MRI & EMG for continued Right thigh pain postop.  Pt. Called me back from MY Chart message to her , using her relatives phone so I could talk to her directly. Rates pain 8 as \"shocks in her Leg\" & taking Oxycodone 5mg QID & Lyrica & Tylenol & Flexeril have refills left.  Only has a few days left of Oxycodone & needs time to contact Primary MD about taking over meds.   Does not want to do the epidural injection ordered.  Scared.  Reviewed again tonight with Sara EVANS while  gone, & she agreed to refill Oxycodone short term.  Pt. Phone not working so  I will send pt. My Chart message That she needs to contact Primary MD to see if they will take over meds.   Use Ice.    Call back prn.  T.O.R.B.Sara EVANS//Belinda Gary RN .  "

## 2020-02-26 DIAGNOSIS — M47.816 LUMBAR SPONDYLOSIS: ICD-10-CM

## 2020-02-26 DIAGNOSIS — M54.16 LUMBAR RADICULAR PAIN: ICD-10-CM

## 2020-02-26 RX ORDER — OXYCODONE HYDROCHLORIDE 5 MG/1
5 TABLET ORAL EVERY 6 HOURS PRN
Qty: 60 TABLET | Refills: 0 | Status: SHIPPED | OUTPATIENT
Start: 2020-02-26

## 2020-02-26 NOTE — TELEPHONE ENCOUNTER
See My Chart message requesting 1 more refill of Oxycodone because [facility].  See last dictation for plan-having MRI & EMG 3-18-20 with RTC appt. 3-24-20.  Continue taking Flexeril & Lyrica & Tylenol as ordered.  Refilled Oxycodone.  Call back prn.  W.O.V.O.R.B./Belinda Gary RN.

## 2020-03-10 ENCOUNTER — HEALTH MAINTENANCE LETTER (OUTPATIENT)
Age: 55
End: 2020-03-10

## 2020-03-16 ENCOUNTER — TELEPHONE (OUTPATIENT)
Dept: NEUROLOGY | Facility: CLINIC | Age: 55
End: 2020-03-16

## 2020-03-16 NOTE — TELEPHONE ENCOUNTER
Due to the COVID-19 virus a risk benefit analysis was performed by the provider and in the best interest of the patient and the facility, the patient was rescheduled.

## 2020-03-31 DIAGNOSIS — Z98.890 S/P SPINAL SURGERY: ICD-10-CM

## 2020-03-31 RX ORDER — PREGABALIN 25 MG/1
CAPSULE ORAL
Qty: 180 CAPSULE | Refills: 1 | Status: SHIPPED | OUTPATIENT
Start: 2020-03-31 | End: 2020-08-31

## 2020-04-01 ENCOUNTER — MYC REFILL (OUTPATIENT)
Dept: ORTHOPEDICS | Facility: CLINIC | Age: 55
End: 2020-04-01

## 2020-04-01 DIAGNOSIS — Z98.890 S/P SPINAL SURGERY: ICD-10-CM

## 2020-04-01 RX ORDER — PREGABALIN 25 MG/1
CAPSULE ORAL
Qty: 180 CAPSULE | Refills: 1 | OUTPATIENT
Start: 2020-04-01

## 2020-04-01 NOTE — TELEPHONE ENCOUNTER
See phone message.. I called pt & told her refill of Lyrica  Was completed.  I called pharmacy to verify.  Belinda Gary RN.

## 2020-04-01 NOTE — TELEPHONE ENCOUNTER
This prescription was filled yesterday. Belinda Gary RN confirmed with pharmacy that prescription was filled by them.

## 2020-04-08 ENCOUNTER — TELEPHONE (OUTPATIENT)
Dept: NEUROLOGY | Facility: CLINIC | Age: 55
End: 2020-04-08

## 2020-04-08 NOTE — TELEPHONE ENCOUNTER
Due to the COVID-19 virus a risk benefit analysis was performed by the provider and in the best interest of the patient and the facility, the patient's 04/16 EMG test was cancelled. LVM to call [phone] to reschedule.

## 2020-04-16 ENCOUNTER — TELEPHONE (OUTPATIENT)
Dept: ORTHOPEDICS | Facility: CLINIC | Age: 55
End: 2020-04-16

## 2020-04-16 NOTE — TELEPHONE ENCOUNTER
Called and left message for pt. Need to change to 2:30pm on 4/21/20 .    She also needs to convert to video

## 2020-08-31 DIAGNOSIS — Z98.890 S/P SPINAL SURGERY: ICD-10-CM

## 2020-08-31 RX ORDER — PREGABALIN 25 MG/1
CAPSULE ORAL
Qty: 180 CAPSULE | Refills: 0 | Status: SHIPPED | OUTPATIENT
Start: 2020-08-31 | End: 2020-12-02

## 2020-12-01 DIAGNOSIS — Z98.890 S/P SPINAL SURGERY: ICD-10-CM

## 2020-12-02 ENCOUNTER — MYC MEDICAL ADVICE (OUTPATIENT)
Dept: ORTHOPEDICS | Facility: CLINIC | Age: 55
End: 2020-12-02

## 2020-12-02 RX ORDER — PREGABALIN 25 MG/1
CAPSULE ORAL
Qty: 180 CAPSULE | Refills: 0 | Status: SHIPPED | OUTPATIENT
Start: 2020-12-02

## 2020-12-20 ENCOUNTER — HEALTH MAINTENANCE LETTER (OUTPATIENT)
Age: 55
End: 2020-12-20

## 2021-04-24 ENCOUNTER — HEALTH MAINTENANCE LETTER (OUTPATIENT)
Age: 56
End: 2021-04-24

## 2021-08-08 ENCOUNTER — HEALTH MAINTENANCE LETTER (OUTPATIENT)
Age: 56
End: 2021-08-08

## 2021-10-03 ENCOUNTER — HEALTH MAINTENANCE LETTER (OUTPATIENT)
Age: 56
End: 2021-10-03

## 2022-02-27 ENCOUNTER — APPOINTMENT (OUTPATIENT)
Dept: GENERAL RADIOLOGY | Facility: CLINIC | Age: 57
End: 2022-02-27
Attending: EMERGENCY MEDICINE
Payer: MEDICARE

## 2022-02-27 ENCOUNTER — HOSPITAL ENCOUNTER (EMERGENCY)
Facility: CLINIC | Age: 57
Discharge: HOME | End: 2022-02-27
Attending: EMERGENCY MEDICINE | Admitting: EMERGENCY MEDICINE
Payer: MEDICARE

## 2022-02-27 VITALS
DIASTOLIC BLOOD PRESSURE: 93 MMHG | SYSTOLIC BLOOD PRESSURE: 148 MMHG | RESPIRATION RATE: 16 BRPM | HEART RATE: 68 BPM | OXYGEN SATURATION: 100 % | TEMPERATURE: 98.3 F

## 2022-02-27 DIAGNOSIS — M25.511 ACUTE PAIN OF RIGHT SHOULDER: ICD-10-CM

## 2022-02-27 PROCEDURE — 250N000013 HC RX MED GY IP 250 OP 250 PS 637: Performed by: EMERGENCY MEDICINE

## 2022-02-27 PROCEDURE — 73030 X-RAY EXAM OF SHOULDER: CPT | Mod: RT

## 2022-02-27 PROCEDURE — 99283 EMERGENCY DEPT VISIT LOW MDM: CPT

## 2022-02-27 RX ORDER — OXYCODONE HYDROCHLORIDE 5 MG/1
5 TABLET ORAL ONCE
Status: COMPLETED | OUTPATIENT
Start: 2022-02-27 | End: 2022-02-27

## 2022-02-27 RX ORDER — LIDOCAINE 4 G/G
1 PATCH TOPICAL ONCE
Status: DISCONTINUED | OUTPATIENT
Start: 2022-02-27 | End: 2022-02-27 | Stop reason: HOSPADM

## 2022-02-27 RX ADMIN — OXYCODONE HYDROCHLORIDE 5 MG: 5 TABLET ORAL at 08:23

## 2022-02-27 RX ADMIN — LIDOCAINE 1 PATCH: 246 PATCH TOPICAL at 08:24

## 2022-02-27 ASSESSMENT — ENCOUNTER SYMPTOMS
NECK PAIN: 1
CHILLS: 0
ARTHRALGIAS: 1
FEVER: 0

## 2022-02-27 NOTE — ED PROVIDER NOTES
History   Chief Complaint:  Shoulder Injury       HPI   Yun Sagastume is a right-handed 57 year old female with history of shoulder pain who presents with right shoulder pain with radiation into her scapula and humerus that started 2 days ago after shoveling snow. The patient states that her arm feels heavy and she can't lay on her right side because of pain.  Movement about the shoulder is also very painful. She has tried icy hot and Tylenol with no relief.. She tried an oxycodone which she is taking for her back, last dose last night, with no relief. She mentions that she has had a history of chronic pain in this shoulder, worse with movement such as reaching in to the back seat. She denies any falls or other trauma. No fevers, chills, or chest pain.  No prior surgeries to the shoulder.      Review of Systems   Constitutional: Negative for chills and fever.   Cardiovascular: Negative for chest pain.   Musculoskeletal: Positive for arthralgias and neck pain.   All other systems reviewed and are negative.    Allergies:  Aspirin  Codeine  Nsaids  Amoxicillin    Medications:  Lipitor  Paxil    Past Medical History:     Anemia  Arthritis  Colon polyp  Depression  Diabetes  Hypertension  Pelvic and abdominal adhesions  Type 2 diabetes    Past Surgical History:    Stomach surgery  Fusion lumbar x3  Sacroplasty  Inject epidural x2    Family History:    Mother: Anxiety, depression, thyroid disease, cancer  Father: Cancer  Sister: Hypertension  Daughter: Hypertension    Social History:  The patient presents to the ED with family member    Physical Exam     Patient Vitals for the past 24 hrs:   BP Temp Temp src Pulse Resp SpO2   02/27/22 0745 (!) 136/111 98.3  F (36.8  C) Temporal 81 18 100 %       Physical Exam  General:              Well-nourished              Speaking in full sentences  Eyes:              Conjunctiva without injection or scleral icterus  ENT:              Moist mucous membranes              Nares  patent              Pinnae normal  Neck:              Full ROM              No stiffness appreciated  Resp:              Lungs CTAB              No crackles, wheezing or audible rubs              Good air movement  CV:                    Normal rate, regular rhythm              S1 and S2 present              No murmur, gallop or rub  Skin:              Warm, dry, well perfused              No rashes or open wounds on exposed skin  MSK:              RUE:              Tenderness to palpation over distal aspect of right trapezius musculature, right posterior scapula, and right proximal humerus              No deformity or swelling              No overlying erythema or warmth              Passive ROM intact to flexion/extension, internal / external rotation, though external rotation exacerbates pain              Compartments soft              2+ radial pulse              No cervical spine tenderness  Neuro:              Alert              5/5  strength, thumbs up, OK sign, wrist extension, finger abduction/adduction              SILT over axillary, median, ulnar and radial nerve distributions              Answers questions appropriately              Moves all extremities equally              Gait stable  Psych:              Normal affect, normal mood    Emergency Department Course     Imaging:  XR Shoulder Right G/E 3 Views   Final Result   IMPRESSION: No acute fracture or malalignment. There is normal glenohumeral joint spacing. Moderate acromioclavicular joint degenerative changes.        Report per radiology    Emergency Department Course:         Reviewed:  I reviewed nursing notes, vitals, past medical history, Care Everywhere, and     Assessments:  0800 I obtained history and examined the patient as noted above.     0809 Found patient in  database. On chronic opioids.     0910 I rechecked the patient and explained findings    Interventions:  0823 Roxicodone 5 mg PO  0824 4% Lidocare 1 patch Transdermal      Disposition:  The patient was discharged to home.     Impression & Plan     Medical Decision Making:  Yun Sagastume Is a 57-year-old female presenting to the ED for evaluation of a right shoulder injury.  History obtained from the patient as well as review of prior records.  Patient does have a history of chronic right shoulder pain, follows with the [facility] pain clinic, and is on oral oxycodone at baseline.  I suspect her current symptoms are secondary to acute exacerbation of underlying chronic pain, with also considerations given towards tendinitis, or bursitis given recent overuse.  Her symptoms were worsened after shoveling snow 2 days previous.  X-ray was obtained, which is negative for acute fracture nor dislocation.  Septic arthritis/inflammatory arthropathy unlikely as patient denies fevers, chills, is afebrile here, exhibits no warmth, erythema, or swelling about the shoulder, and demonstrates adequate passive range of motion.  At this time I feel risks of arthrocentesis outweigh benefits.  Referred cardiac ischemia also felt unlikely given the clearly reproducible nature of pain, absence of chest discomfort, and above history.  She exhibits no evidence of neurovascular compromise distally.  She was provided the above analgesia as well as a shoulder sling.  Supportive outpatient treatment indicated including rest, limited weightbearing with right upper extremity, shoulder sling for support, topical lidocaine patches (instructed to leave in place for 12 hours, then remove for 12 hours and not apply heat over the patch).  Recommended follow-up with [facility] pain clinic for further evaluation as well as [facility].  She may benefit from MRI imaging given the chronicity of her pain, though do not feel this is emergently indicated from the ER.  Will DC home with daughter.  Return to ER with worsened pain, swelling, erythema, or any other concerns.     Diagnosis:    ICD-10-CM    1.  Acute pain of right shoulder  M25.511      Scribe Disclosure:  I, Lopez Perez, am serving as a scribe at 7:58 AM on 2/27/2022 to document services personally performed by Jose Ramon Patino MD based on my observations and the provider's statements to me.          Jose Ramon Patino MD  02/27/22 0912

## 2022-02-27 NOTE — ED TRIAGE NOTES
Patient presents with complaints of right shoulder pain which started on Friday when shoveling snow. Patient is now unable to move arm due to pain. ABC intact without need for intervention at this time.

## 2022-02-27 NOTE — DISCHARGE INSTRUCTIONS
Please monitor your symptoms closely.  Avoid heavy lifting with your right arm.  Continue with your previous medication regimen.    You may also add topical lidocaine patches.  Leave in place for 12 hours, then remove for 12 hours.  Do not apply heat overlying the patch.    Follow-up with your pain clinic.    Return to the ER with any new or troubling symptoms such as increased pain, weakness, numbness or any other concerns.

## 2022-03-20 ENCOUNTER — HEALTH MAINTENANCE LETTER (OUTPATIENT)
Age: 57
End: 2022-03-20

## 2022-09-11 ENCOUNTER — HEALTH MAINTENANCE LETTER (OUTPATIENT)
Age: 57
End: 2022-09-11

## 2022-10-31 NOTE — PLAN OF CARE
Discharge Planner OT   Patient plan for discharge: Home with assist  Current status: OT eval and tx completed. Pt provided with further education on surgical precautions, impact on ADLs/activity and compensatory strategies for tasks. Pt reported she has been getting up independently to use bathroom since surgery. Pt educated on modified strategies for LE dressing and tub/shower transfer - pt declined trial of either stating she had no concerns and will have assist upon discharge as needed; also has all required AE.  Barriers to return to prior living situation: pain, surgical precautions  Recommendations for discharge: Home with assist for bathing as is pt's baseline  Rationale for recommendations: pt reports being (I) with toileting; declined trialing any further ADLs while IP; pt verbalized understanding of modified strategies/AE; will have assist from [employer] [employer] as needed       Entered by: Maria A Overton 08/25/2019 2:44 PM     Occupational Therapy Discharge Summary    Reason for therapy discharge:    All goals and outcomes met, no further needs identified.    Progress towards therapy goal(s). See goals on Care Plan in [facility] electronic health record for goal details.  Goals met    Therapy recommendation(s):    No further therapy is recommended.       Quality 110: Preventive Care And Screening: Influenza Immunization: Influenza immunization was not ordered or administered, reason not given Detail Level: Detailed Quality 431: Preventive Care And Screening: Unhealthy Alcohol Use - Screening: Patient not identified as an unhealthy alcohol user when screened for unhealthy alcohol use using a systematic screening method Quality 226: Preventive Care And Screening: Tobacco Use: Screening And Cessation Intervention: Patient screened for tobacco use and is an ex/non-smoker Quality 111:Pneumonia Vaccination Status For Older Adults: Pneumococcal vaccine (PPSV23) was not administered on or after patient’s 60th birthday and before the end of the measurement period, reason not otherwise specified

## 2023-01-22 ENCOUNTER — HEALTH MAINTENANCE LETTER (OUTPATIENT)
Age: 58
End: 2023-01-22

## 2023-04-30 ENCOUNTER — HEALTH MAINTENANCE LETTER (OUTPATIENT)
Age: 58
End: 2023-04-30

## 2023-07-29 ENCOUNTER — HEALTH MAINTENANCE LETTER (OUTPATIENT)
Age: 58
End: 2023-07-29

## 2024-02-18 ENCOUNTER — HEALTH MAINTENANCE LETTER (OUTPATIENT)
Age: 59
End: 2024-02-18

## 2024-04-28 ENCOUNTER — HEALTH MAINTENANCE LETTER (OUTPATIENT)
Age: 59
End: 2024-04-28

## 2024-07-07 ENCOUNTER — HEALTH MAINTENANCE LETTER (OUTPATIENT)
Age: 59
End: 2024-07-07

## 2024-09-15 ENCOUNTER — HEALTH MAINTENANCE LETTER (OUTPATIENT)
Age: 59
End: 2024-09-15

## 2024-09-28 NOTE — PROCEDURES
Transforaminal Epidural Steroid Injection    The patient s identity, the procedure to be performed and the specific site of the procedure was verified in accordance with [facility] Universal Protocol.   Pre-procedure pain Score 5/10  Procedure Note:    Informed consent was obtained.  The patient was positioned comfortably in the Prone position and was prepped and draped in a sterile fashion.  There was no evidence of infection at the site of needle insertion.  The skin was anesthetized with 1% lidocaine and a spinal needle was then placed in the proximity of the neuroforamen under fluoroscopic guidance.  CSF was not aspirated, blood was not aspirated.  Placement was then confirmed at each level in two planes of reference with radio-opaque contrast.  The patient tolerated the procedure without complications and was observed for 30 minutes post-injection.      The patient was given discharge instructions and verbalizes understanding, including understanding of those signs and symptoms that would require emergency care.     Level(s):   Bilateral L5-S1              Needle Type:   22 gauge spinal        Medication:  40 mg  Methylprednisolone   1 ml 1% lidocaine  Post-procedure pain Score 0/10  Counseling: Greater than 50% of this patient visit was spent in counseling the patient regarding the treatment of their pain, coordinating their overall treatment plan and assessing their progress.         C/sx1 - 2022  stillbirth at 32 weeks

## (undated) DEVICE — LINEN BACK PACK 5440

## (undated) DEVICE — SPONGE LAP 18X18" X8435

## (undated) DEVICE — DRAPE STERI TOWEL LG 1010

## (undated) DEVICE — SPONGE SURGIFOAM 100 1974

## (undated) DEVICE — NDL 27GA 1.25" 305136

## (undated) DEVICE — DRAPE LAP W/ARMBOARD 29410

## (undated) DEVICE — SPONGE COTTONOID 1/2X1/2" 80-1400

## (undated) DEVICE — LINEN TOWEL PACK X5 5464

## (undated) DEVICE — SUCTION TIP YANKAUER STR K87

## (undated) DEVICE — SOL ADH LIQUID BENZOIN SWAB 0.6ML C1544

## (undated) DEVICE — ESU GROUND PAD UNIVERSAL W/O CORD

## (undated) DEVICE — DRSG AQUACEL AG 3.5X13.75" HYDROFIBER 412012

## (undated) DEVICE — SU MONOCRYL 4-0 PS-2 18" UND Y496G

## (undated) DEVICE — GOWN XLG DISP 9545

## (undated) DEVICE — SU VICRYL 2-0 CT-1 27" UND J259H

## (undated) DEVICE — ESU ELEC BLADE 2.75" COATED/INSULATED E1455

## (undated) DEVICE — DRSG GAUZE 4X4" 2187

## (undated) DEVICE — GLOVE PROTEXIS MICRO 8.0  2D73PM80

## (undated) DEVICE — DRAPE C-ARM W/STRAPS 42X72" 07-CA104

## (undated) DEVICE — SYR 10ML LL W/O NDL 302995

## (undated) DEVICE — SU VICRYL 3-0 SH 27" J316H

## (undated) DEVICE — SU VICRYL 2-0 CT-2 27" UND J269H

## (undated) DEVICE — BASIN SET MAJOR

## (undated) DEVICE — Device

## (undated) DEVICE — SYR 10ML LL W/O NDL

## (undated) DEVICE — SPECIMEN CONTAINER 5OZ STERILE 2600SA

## (undated) DEVICE — TRAY SINGLE DOSE EPIDURAL ANESTHESIA 332254

## (undated) DEVICE — PACK SET-UP STD 9102

## (undated) DEVICE — SU MONOCRYL 3-0 PS-2 18" UND Y497G

## (undated) DEVICE — PREP CHLORAPREP W/ORANGE TINT 10.5ML 260715

## (undated) DEVICE — ESU PENCIL W/HOLSTER E2350H

## (undated) DEVICE — SYR 50ML LL W/O NDL 309653

## (undated) DEVICE — CLIP APPLIER 09 3/8" SM LIGACLIP MCS20

## (undated) DEVICE — NDL COUNTER 20CT 31142493

## (undated) DEVICE — SYR 30ML LL W/O NDL 302832

## (undated) DEVICE — GLOVE PROTEXIS BLUE W/NEU-THERA 8.5  2D73EB85

## (undated) DEVICE — CATH TRAY FOLEY SURESTEP 16FR WDRAIN BAG STLK LATEX A300316A

## (undated) DEVICE — ESU HOLSTER PLASTIC DISP E2400

## (undated) DEVICE — ESU CORD BIPOLAR GREEN 10-4000

## (undated) DEVICE — VESSEL LOOPS RED MAXI

## (undated) DEVICE — NDL SPINAL 22GA 5" QUINCKE 405148

## (undated) DEVICE — LINEN TOWEL PACK X30 5481

## (undated) DEVICE — DRAIN HEMOVAC RESERVOIR KIT 10FR 1/8" MED 00-2550-002-10

## (undated) DEVICE — SU DERMABOND ADVANCED .7ML DNX12

## (undated) DEVICE — SYR 10ML SLIP TIP W/O NDL 303134

## (undated) DEVICE — SU VICRYL 3-0 SH 27" UND J416H

## (undated) DEVICE — DRSG STERI STRIP 1/2X4" R1547

## (undated) DEVICE — SPONGE KITTNER 31001010

## (undated) DEVICE — SU PROLENE 4-0 BBDA 36" 8581

## (undated) DEVICE — DRAPE O ARM TUBE 9732722

## (undated) DEVICE — DRSG DRAIN 4X4" 7086

## (undated) DEVICE — TAPE DURAPORE ADVANCED PURPLE 3" 1590-3

## (undated) DEVICE — SU VICRYL 0 CT-1 27" UND J260H

## (undated) DEVICE — PREP CHLORAPREP 26ML TINTED ORANGE  260815

## (undated) DEVICE — NDL ANGIOCATH 14GA 1.25" 4048

## (undated) DEVICE — SYR 05ML LL W/O NDL

## (undated) DEVICE — NDL 18GAX1.5" 305185

## (undated) DEVICE — SOL ISOPROPYL RUBBING ALCOHOL USP 70% 4OZ HDX-20 I0020

## (undated) DEVICE — CELL SAVER

## (undated) DEVICE — ENDO DISSECTOR BLUNT 05MM  BTD05

## (undated) DEVICE — SU VICRYL 1 CT-1 CR 8X18" J741D

## (undated) DEVICE — RX SURGIFLO HEMOSTATIC MATRIX W/THROMBIN 8ML NEXTGEN 2993

## (undated) DEVICE — DRAPE POUCH INSTRUMENT 1018

## (undated) DEVICE — SU PDS II 0 CTX 60" Z990G

## (undated) DEVICE — ESU ELEC BLADE 6" COATED/INSULATED E1455-6

## (undated) DEVICE — NDL 25GA 1.5" 305127

## (undated) DEVICE — SPONGE COTTONOID 3/4X3/4" 80-1401

## (undated) DEVICE — GLOVE PROTEXIS W/NEU-THERA 8.5  2D73TE85

## (undated) DEVICE — SOL WATER IRRIG 1000ML BOTTLE 2F7114

## (undated) DEVICE — MAXCESS 4 KIT

## (undated) DEVICE — NDL SPINAL 22GA 3.5" QUINCKE 405181

## (undated) DEVICE — NDL SPINAL 18GA 3.5" 405184

## (undated) DEVICE — SU VICRYL 0 CT-1 3X27" J430T

## (undated) DEVICE — DRSG AQUACEL AG 3.5X6.0" HYDROFIBER 412010

## (undated) DEVICE — CLIP APPLIER 11" MED LIGACLIP MCM30

## (undated) DEVICE — GLOVE ESTEEM POWDER FREE 8.0  2D72PL80

## (undated) DEVICE — SU SILK 3-0 TIE 12X30" A304H

## (undated) DEVICE — DRSG TEGADERM 4X4 3/4" 1626W

## (undated) DEVICE — SU PROLENE 5-0 RB-1DA 36"  8556H

## (undated) DEVICE — DRAPE BACK TABLE  44X90" 8377

## (undated) DEVICE — WIPES FOLEY CARE SURESTEP PROVON DFC100

## (undated) DEVICE — SOL NACL 0.9% IRRIG 1000ML BOTTLE 2F7124

## (undated) DEVICE — LABEL MEDICATION SYSTEM 3303-P

## (undated) DEVICE — BONE WAX 2.5GM W31G

## (undated) DEVICE — BLADE BONE MILL STRK 5.0MM MED 5400-701-000

## (undated) DEVICE — DRSG ABDOMINAL 07 1/2X8" 7197D

## (undated) DEVICE — SYR 20ML LL W/O NDL 302830

## (undated) DEVICE — ESU ELEC BLADE 6" COATED E1450-6

## (undated) DEVICE — MARKER SPHERES PASSIVE MEDT PACK 5 8801075

## (undated) DEVICE — MIDAS REX DISSECTING TOOL 4MM BALL 140MM 14BA40

## (undated) DEVICE — MIDAS REX DISSECTING TOOL  14MH30

## (undated) DEVICE — DRSG BANDAID 1X3" FABRIC CURITY LATEX FREE KC44101

## (undated) DEVICE — SU UMBILICAL TAPE 36X.125" U12T

## (undated) DEVICE — DRSG KERLIX FLUFFS X5

## (undated) DEVICE — SU PROLENE 5-0 C-1 DA 24" 8725H

## (undated) DEVICE — KIT PATIENT CARE PROAXIS SYSTEM 6988-PV-ACP

## (undated) DEVICE — DRSG PRIMAPORE 03 1/8X6" 66000318

## (undated) DEVICE — BLADE KNIFE SURG 10 371110

## (undated) DEVICE — SYR 03ML LL W/O NDL 309657

## (undated) DEVICE — MIXER BONE CEMENT KYPHX A07A

## (undated) DEVICE — SU SILK 3-0 SH 30" K832H

## (undated) DEVICE — GOWN IMPERVIOUS SPECIALTY XLG/XLONG 32474

## (undated) DEVICE — POSITIONER ARMBOARD FOAM 1PAIR LF FP-ARMB1

## (undated) DEVICE — DRAPE MAYO STAND 23X54 8337

## (undated) DEVICE — KIT CULTURE TRANSPORT SYS A.C.T. II DUAL ANEROBE R124022

## (undated) DEVICE — DRAIN PENROSE 1/4X18" LATEX

## (undated) DEVICE — MITT SURGICAL PREP HAIR REMOVER LATEX FREE SPM100

## (undated) DEVICE — CLIP APPLIER 13" LG LIGACLIP MCL20

## (undated) DEVICE — TUBING IV EXT SET MICRO VOLUME MALE LL ADAPTER 36" 2N3345

## (undated) DEVICE — SYR 10ML PERFIX LL 332152

## (undated) DEVICE — ADH SKIN CLOSURE PREMIERPRO EXOFIN 1.0ML 3470

## (undated) DEVICE — DRAIN JACKSON PRATT RESERVOIR 100ML SU130-1305

## (undated) DEVICE — SPONGE SURGIFOAM 01GM POWDER 1978

## (undated) DEVICE — SUCTION MANIFOLD DORNOCH ULTRA CART UL-CL500

## (undated) DEVICE — TAPE MEDIPORE 4"X2YD 2864

## (undated) RX ORDER — FENTANYL CITRATE 50 UG/ML
INJECTION, SOLUTION INTRAMUSCULAR; INTRAVENOUS
Status: DISPENSED
Start: 2017-08-17

## (undated) RX ORDER — GABAPENTIN 300 MG/1
CAPSULE ORAL
Status: DISPENSED
Start: 2019-07-29

## (undated) RX ORDER — PHENYLEPHRINE HCL IN 0.9% NACL 1 MG/10 ML
SYRINGE (ML) INTRAVENOUS
Status: DISPENSED
Start: 2019-07-29

## (undated) RX ORDER — GLYCOPYRROLATE 0.2 MG/ML
INJECTION INTRAMUSCULAR; INTRAVENOUS
Status: DISPENSED
Start: 2019-07-29

## (undated) RX ORDER — FENTANYL CITRATE 50 UG/ML
INJECTION, SOLUTION INTRAMUSCULAR; INTRAVENOUS
Status: DISPENSED
Start: 2019-08-23

## (undated) RX ORDER — DEXAMETHASONE SODIUM PHOSPHATE 4 MG/ML
INJECTION, SOLUTION INTRA-ARTICULAR; INTRALESIONAL; INTRAMUSCULAR; INTRAVENOUS; SOFT TISSUE
Status: DISPENSED
Start: 2019-07-29

## (undated) RX ORDER — SODIUM CHLORIDE 9 MG/ML
INJECTION, SOLUTION INTRAVENOUS
Status: DISPENSED
Start: 2019-08-23

## (undated) RX ORDER — GABAPENTIN 300 MG/1
CAPSULE ORAL
Status: DISPENSED
Start: 2019-08-23

## (undated) RX ORDER — BUPIVACAINE HYDROCHLORIDE AND EPINEPHRINE 2.5; 5 MG/ML; UG/ML
INJECTION, SOLUTION EPIDURAL; INFILTRATION; INTRACAUDAL; PERINEURAL
Status: DISPENSED
Start: 2019-07-29

## (undated) RX ORDER — ONDANSETRON 2 MG/ML
INJECTION INTRAMUSCULAR; INTRAVENOUS
Status: DISPENSED
Start: 2019-07-29

## (undated) RX ORDER — FENTANYL CITRATE 50 UG/ML
INJECTION, SOLUTION INTRAMUSCULAR; INTRAVENOUS
Status: DISPENSED
Start: 2019-07-29

## (undated) RX ORDER — FENTANYL CITRATE 50 UG/ML
INJECTION, SOLUTION INTRAMUSCULAR; INTRAVENOUS
Status: DISPENSED
Start: 2019-12-02

## (undated) RX ORDER — ACETAMINOPHEN 325 MG/1
TABLET ORAL
Status: DISPENSED
Start: 2019-12-02

## (undated) RX ORDER — HYDROMORPHONE HYDROCHLORIDE 1 MG/ML
INJECTION, SOLUTION INTRAMUSCULAR; INTRAVENOUS; SUBCUTANEOUS
Status: DISPENSED
Start: 2019-08-23

## (undated) RX ORDER — VANCOMYCIN HYDROCHLORIDE 1 G/20ML
INJECTION, POWDER, LYOPHILIZED, FOR SOLUTION INTRAVENOUS
Status: DISPENSED
Start: 2019-07-29

## (undated) RX ORDER — SCOLOPAMINE TRANSDERMAL SYSTEM 1 MG/1
PATCH, EXTENDED RELEASE TRANSDERMAL
Status: DISPENSED
Start: 2019-07-29

## (undated) RX ORDER — SODIUM CHLORIDE 9 MG/ML
INJECTION, SOLUTION INTRAVENOUS
Status: DISPENSED
Start: 2019-12-02

## (undated) RX ORDER — KETOROLAC TROMETHAMINE 30 MG/ML
INJECTION, SOLUTION INTRAMUSCULAR; INTRAVENOUS
Status: DISPENSED
Start: 2019-07-29

## (undated) RX ORDER — ONDANSETRON 2 MG/ML
INJECTION INTRAMUSCULAR; INTRAVENOUS
Status: DISPENSED
Start: 2019-08-23

## (undated) RX ORDER — LIDOCAINE HYDROCHLORIDE 20 MG/ML
INJECTION, SOLUTION EPIDURAL; INFILTRATION; INTRACAUDAL; PERINEURAL
Status: DISPENSED
Start: 2019-07-29

## (undated) RX ORDER — LIDOCAINE HYDROCHLORIDE 10 MG/ML
INJECTION, SOLUTION EPIDURAL; INFILTRATION; INTRACAUDAL; PERINEURAL
Status: DISPENSED
Start: 2019-12-02

## (undated) RX ORDER — ACETAMINOPHEN 325 MG/1
TABLET ORAL
Status: DISPENSED
Start: 2019-07-29

## (undated) RX ORDER — CEFAZOLIN SODIUM 1 G/3ML
INJECTION, POWDER, FOR SOLUTION INTRAMUSCULAR; INTRAVENOUS
Status: DISPENSED
Start: 2019-07-29

## (undated) RX ORDER — ACETAMINOPHEN 325 MG/1
TABLET ORAL
Status: DISPENSED
Start: 2019-08-23

## (undated) RX ORDER — PROPOFOL 10 MG/ML
INJECTION, EMULSION INTRAVENOUS
Status: DISPENSED
Start: 2019-07-29

## (undated) RX ORDER — DEXAMETHASONE SODIUM PHOSPHATE 4 MG/ML
INJECTION, SOLUTION INTRA-ARTICULAR; INTRALESIONAL; INTRAMUSCULAR; INTRAVENOUS; SOFT TISSUE
Status: DISPENSED
Start: 2019-08-23

## (undated) RX ORDER — HYDROMORPHONE HYDROCHLORIDE 1 MG/ML
INJECTION, SOLUTION INTRAMUSCULAR; INTRAVENOUS; SUBCUTANEOUS
Status: DISPENSED
Start: 2019-07-29

## (undated) RX ORDER — CEFAZOLIN SODIUM 2 G/100ML
INJECTION, SOLUTION INTRAVENOUS
Status: DISPENSED
Start: 2019-08-23

## (undated) RX ORDER — CEFAZOLIN SODIUM 2 G/100ML
INJECTION, SOLUTION INTRAVENOUS
Status: DISPENSED
Start: 2019-07-29

## (undated) RX ORDER — PROPOFOL 10 MG/ML
INJECTION, EMULSION INTRAVENOUS
Status: DISPENSED
Start: 2019-08-23

## (undated) RX ORDER — ESMOLOL HYDROCHLORIDE 10 MG/ML
INJECTION INTRAVENOUS
Status: DISPENSED
Start: 2019-07-29